# Patient Record
Sex: MALE | Race: BLACK OR AFRICAN AMERICAN | Employment: OTHER | URBAN - METROPOLITAN AREA
[De-identification: names, ages, dates, MRNs, and addresses within clinical notes are randomized per-mention and may not be internally consistent; named-entity substitution may affect disease eponyms.]

---

## 2018-07-11 ENCOUNTER — OFFICE VISIT (OUTPATIENT)
Dept: ENT CLINIC | Age: 83
End: 2018-07-11

## 2018-07-11 VITALS — DIASTOLIC BLOOD PRESSURE: 70 MMHG | SYSTOLIC BLOOD PRESSURE: 120 MMHG

## 2018-07-11 DIAGNOSIS — H81.09 LABYRINTHINE VERTIGO, UNSPECIFIED LATERALITY: Primary | ICD-10-CM

## 2018-07-11 PROCEDURE — 99204 OFFICE O/P NEW MOD 45 MIN: CPT | Performed by: OTOLARYNGOLOGY

## 2018-07-11 RX ORDER — ATORVASTATIN CALCIUM 20 MG/1
20 TABLET, FILM COATED ORAL DAILY
COMMUNITY

## 2018-07-11 ASSESSMENT — ENCOUNTER SYMPTOMS
ALLERGIC/IMMUNOLOGIC NEGATIVE: 1
SINUS PAIN: 0
RESPIRATORY NEGATIVE: 1
SORE THROAT: 0
RHINORRHEA: 0
SINUS PRESSURE: 0
TROUBLE SWALLOWING: 0
FACIAL SWELLING: 0
VOICE CHANGE: 0
EYES NEGATIVE: 1

## 2020-08-10 ENCOUNTER — HOSPITAL ENCOUNTER (OUTPATIENT)
Dept: GENERAL RADIOLOGY | Age: 85
Discharge: HOME | End: 2020-08-10
Payer: MEDICARE

## 2020-08-10 PROCEDURE — 74220 X-RAY XM ESOPHAGUS 1CNTRST: CPT

## 2020-09-11 ENCOUNTER — OFFICE VISIT (OUTPATIENT)
Dept: ENT CLINIC | Age: 85
End: 2020-09-11
Payer: MEDICARE

## 2020-09-11 VITALS — TEMPERATURE: 97.7 F | HEART RATE: 74 BPM | SYSTOLIC BLOOD PRESSURE: 151 MMHG | DIASTOLIC BLOOD PRESSURE: 84 MMHG

## 2020-09-11 PROCEDURE — 99213 OFFICE O/P EST LOW 20 MIN: CPT | Performed by: OTOLARYNGOLOGY

## 2020-09-11 RX ORDER — OMEPRAZOLE 40 MG/1
40 CAPSULE, DELAYED RELEASE ORAL DAILY
Qty: 30 CAPSULE | Refills: 3 | Status: SHIPPED | OUTPATIENT
Start: 2020-09-11 | End: 2020-10-11

## 2020-09-11 NOTE — PROGRESS NOTES
Patient relates to specific problems. He has been having a problem with swallowing that has been occurring over the past several weeks. He had a recent barium swallow which did show esophageal dysmotility. No mass lesions have been noted. He is not had problems relative to aspiration. No fevers been noted. In addition over the past couple of weeks, he has been having vertigo lasting only a few seconds at a time when he turns to the right side. Does not occur at any other position. He has had chronic tinnitus but no change in it and this is bilateral.  Is had no particular change in his hearing. He has had no true vertigo other than that described. He does not get nauseous with the episodes. He is on no medications likely to precipitate it and no medications to treat it. He had somewhat similar episodes several years ago which we evaluated at that time. He does have a hearing loss for which she has been suggested to wear hearing aids on both sides. Currently, he appears in no acute distress. No nystagmus is noted. Ear examination reveals normal-appearing tympanic membranes and ear canals bilaterally. Oral examination is unremarkable. Indirect laryngoscopy reveals normal vocal cord function with no lesions noted. There is some mild inflammation posteriorly which may be consistent with some reflux. The neck is free of any adenopathy, mass, thyroid enlargement. Nasal mucosa is mildly edematous consistent with allergy but no purulence is noted and no evidence of obstruction is present. Findings are likely those of benign positional paroxysmal vertigo on the right side. I have discussed this in depth with him and I do feel that physical therapy for balance would be the best possible treatment for it. In addition the esophageal motility problem should be addressed with speech therapy doing dysphagia treatment. This will be ordered as well.   In addition, we will add omeprazole to control any reflux which could be contributing as well. He is understanding of all the methods described and I will be kept posted.

## 2020-09-14 ENCOUNTER — TELEPHONE (OUTPATIENT)
Dept: ENT CLINIC | Age: 85
End: 2020-09-14

## 2020-09-14 RX ORDER — FAMOTIDINE 40 MG/1
40 TABLET, FILM COATED ORAL EVERY EVENING
Qty: 30 TABLET | Refills: 0 | Status: SHIPPED | OUTPATIENT
Start: 2020-09-14 | End: 2020-10-06

## 2020-09-14 NOTE — TELEPHONE ENCOUNTER
Pt calling and states he would like a different medication that the Omeprazole.  After reading the side effects on the papers he does not want to try it

## 2020-09-22 ENCOUNTER — HOSPITAL ENCOUNTER (OUTPATIENT)
Dept: SPEECH THERAPY | Age: 85
Setting detail: THERAPIES SERIES
Discharge: HOME | End: 2020-09-22
Payer: MEDICARE

## 2020-09-22 ENCOUNTER — HOSPITAL ENCOUNTER (OUTPATIENT)
Dept: PHYSICAL THERAPY | Age: 85
Setting detail: THERAPIES SERIES
Discharge: HOME | End: 2020-09-22
Payer: MEDICARE

## 2020-09-22 PROCEDURE — 92526 ORAL FUNCTION THERAPY: CPT

## 2020-09-22 PROCEDURE — 92610 EVALUATE SWALLOWING FUNCTION: CPT

## 2020-09-22 PROCEDURE — 97161 PT EVAL LOW COMPLEX 20 MIN: CPT

## 2020-09-22 PROCEDURE — 97112 NEUROMUSCULAR REEDUCATION: CPT

## 2020-09-22 PROCEDURE — 97140 MANUAL THERAPY 1/> REGIONS: CPT

## 2020-09-22 NOTE — FLOWSHEET NOTE
[facility] - Outpatient Physical Therapy    Physical Therapy Daily Treatment Note  Date:  2020    Patient Name:  Moisés Pino    :  1935  MRN: [id number]  Medical/Treatment Diagnosis Information:  · Diagnosis: H81.11 (ICD-10-CM) - Benign paroxysmal positional vertigo of right ear  · Treatment Diagnosis: dizziness, positional vertigo  Insurance/Certification information:  PT Insurance Information: Tomi Poe  W Mickey Segal  Physician Information:  Referring Practitioner: Maia Colon MD  Plan of care signed (Y/N): []  Yes [x]  No     Progress Report: [x]  Yes  []  No     Date Range for reporting period:  Beginning 20  Ending TBD    Progress report due (10 Rx/or 30 days whichever is less): DC     Recertification due (POC duration/ or 90 days whichever is less): DC     Visit # Insurance Allowable Date Range (if applicable)        Latex Allergy:  [x]NO      []YES  Preferred Language for Healthcare:   [x]English       []other:    Dizziness level:  0-6/10     SUBJECTIVE:  See eval    OBJECTIVE: See eval    RESTRICTIONS/PRECAUTIONS: difficulty swallowing    Exercises/Interventions:     Therapeutic Exercises (67170)  Resistance Sets / Reps Notes                                                                     Neuromuscular Re-ed (04994) Therapeutic Activities (53623)      SEE EDU BELOW  8'                                   Manual Intervention (15921)      EPLEY R for PC BPPV  2X 9/22 - performed, patient & spouse educated                                   Patient Education & HEP:  - Patient educated on the focus of skilled physical therapy services and plan of care, including: diagnosis, prognosis, treatment goals & options. Patient educated on otoliths and how they become canaliths, as well as how they are restored to otoliths through repositioning techniques.  The patient was educated on performance of Epley safely, and encouraged to perform twice per day until symptoms resolve. - The following exercises were added to the patient's home exercise program. (R SELF EPLEY) Additionally, the patient was educated on appropriate frequency, intensity and duration. A handout was provided  - All patient questions were answered    Therapeutic Exercise and NMR EXR  [] (58366) Provided verbal/tactile cueing for activities related to strengthening, flexibility, endurance, ROM for improvements in  [] LE / Lumbar: LE, proximal hip, and core control with self care, mobility, lifting, ambulation. [] UE / Cervical: cervical, postural, scapular, scapulothoracic and UE control with self care, reaching, carrying, lifting, house/yardwork, driving, computer work. [x] (98604) Provided verbal/tactile cueing for activities related to improving balance, coordination, kinesthetic sense, posture, motor skill, proprioception to assist with   [] LE / lumbar: LE, proximal hip, and core control in self care, mobility, lifting, ambulation and eccentric single leg control. [x] UE / cervical: cervical, scapular, scapulothoracic and UE control with self care, reaching, carrying, lifting, house/yardwork, driving, computer work.      NMR and Therapeutic Activities:    [x] (92509 or 47388) Provided verbal/tactile cueing for activities related to improving balance, coordination, kinesthetic sense, posture, motor skill, proprioception and motor activation to allow for proper function of   [] LE: / Lumbar core, proximal hip and LE with self care and ADLs  [x] UE / Cervical: cervical, postural, scapular, scapulothoracic and UE control with self care, carrying, lifting, driving, computer work.   [] (77093) Gait Re-education- Provided training and instruction to the patient for proper LE, core and proximal hip recruitment and positioning and eccentric body weight control with ambulation re-education including up and down stairs     Home Exercise Program:    [] (55740) Reviewed/Progressed HEP activities related to strengthening, flexibility, endurance, ROM of   [] LE / Lumbar: core, proximal hip and LE for functional self-care, mobility, lifting and ambulation/stair navigation   [] UE / Cervical: cervical, postural, scapular, scapulothoracic and UE control with self care, reaching, carrying, lifting, house/yardwork, driving, computer work  [x] (35942)Reviewed/Progressed HEP activities related to improving balance, coordination, kinesthetic sense, posture, motor skill, proprioception of   [] LE: core, proximal hip and LE for self care, mobility, lifting, and ambulation/stair navigation    [x] UE / Cervical: cervical, postural,  scapular, scapulothoracic and UE control with self care, reaching, carrying, lifting, house/yardwork, driving, computer work    Manual Treatments:  PROM / STM / Oscillations-Mobs:  G-I, II, III, IV (PA's, Inf., Post.)  [x] (69427) Provided manual therapy to mobilize LE, proximal hip and/or LS spine soft tissue/joints for the purpose of modulating pain, promoting relaxation,  increasing ROM, reducing/eliminating soft tissue swelling/inflammation/restriction, improving soft tissue extensibility and allowing for proper ROM for normal function with   [] LE / lumbar: self care, mobility, lifting and ambulation. [x] UE / Cervical: self care, reaching, carrying, lifting, house/yardwork, driving, computer work. Modalities:  [] (38019) Vasopneumatic compression: Utilized vasopneumatic compression to decrease edema / swelling for the purpose of improving mobility and quad tone / recruitment which will allow for increased overall function including but not limited to self-care, transfers, ambulation, and ascending / descending stairs.      Modalities: Consider [facility]    Charges:  Timed Code Treatment Minutes: 23   Total Treatment Minutes: 45     [x] EVAL - LOW (16481)   [] EVAL - MOD (66605)  [] EVAL - HIGH (16066)  [] RE-EVAL (75197)  [] TE (90433) x      [] Ionto  [x] NMR (26525) x 1      [] Vaso  [x] Manual plan (see comments)  [x] Plan of care initiated [] Hold pending MD visit [] Discharge    Electronically signed by: Sherman Dawson PT, DPT    Note: If patient does not return for scheduled/ recommended follow up visits, this note will serve as a discharge from care along with most recent update on progress.

## 2020-09-22 NOTE — PROGRESS NOTES
[facility] - Swallowing      Patient: Yakelin Cardona  : 1935  MRN: [id number]  Date: 2020  Electronically Signed by: Ema Hammans, MA, CCC-SLP       Note: In Levels 3-5, some patients may meet only one of the \"and/or\" criteria listed. If you have difficulty deciding on the most appropriate level for an individual, use dietary level as the most important criterion if the dietary level is the result of swallow function rather than dentition only. Dietary levels at [facility] and 7 should be judged only on swallow function, and any influence of poor dentition should be disregarded. []  Level 1 Individual is not able to swallow anything safely by mouth. All nutrition and hydration is received through non-oral means (e.g., nasogastric tube, PEG)    []  Level 2 Individual is not able to swallow safely by mouth for nutrition and hydration, but may take some consistency with consistent maximal cues in therapy only. Alternative method of feeding required    []  Level 3  Alternative method of feeding required as individual takes less than 50% of nutrition and hydration by mouth, and/or swallowing is safe with consistent use of moderate cues to use compensatory strategies and/or requires maximum diet restriction    []  Level 4 Swallowing is safe, but usually requires moderate cues to use compensatory strategies, and/or the individual has moderate diet restrictions and /or still requires tube feeding and/or oral supplements    [x]  Level 5 Swallowing is safe with minimal diet restriction and/or occasionally requires minimal cueing to use compensatory strategies. The individual may occasionally self-cue. All nutrition and hydration needs are met by mouth at mealtime    []  Level 6 Swallowing is safe, and the individual eats and drinks independently and may rarely require minimal cueing. The individual usually self-cues when difficulty occurs.   May need to avoid specific food items (e.g., popcorn and nuts), or require additional time (due to dysphasia)    []  Level 7 The individual's ability to eat independently is not limited by swallow function. Swallowing would be safe and efficient for all consistencies. Compensatory strategies are effectively used when needed       Diet levels/restrictions are defined on next page. Please use levels as a guide in scoring this FCM                                        Swallowing: Dietary Levels/Restrictions  · Maximum Restrictions: Diet is two or more levels below a regular diet status and liquid consistency  · Moderate Restrictions: Diet is two or more levels below a regular diet status in either solid or liquid consistency (but not both), OR diet is one level below in both solid and liquid consistency   · Minimum Restrictions: Diet is one level below a regular diet status in solid or liquid consistency    Solids  · Regular: No restrictions  · Reduced One Level: Meats are cooked until soft, with not tough or stringy foods. Might include meats like meat loaf, baked fish, and soft chicken. Vegetables are cooked soft  · Reduced Two Levels: Meats are chopped or ground. Vegetables are one consistency (e.g., souffle, baked potato) or are mashed with a fork  · Reduced Three Levels: Meats and vegetables are pureed    Liquids  · Regular: Thin liquids;  No restrictions  · Reduced One Level: Nectar, syrup; mildly thick    · Reduced Two Levels: Honey; moderately thick  · Reduced Three Levels: Pudding; extra thick

## 2020-09-22 NOTE — PLAN OF CARE
[place], [address] [facility], [address]  Phone: [phone]   Fax: [phone]     Physical Therapy Certification    Dear Referring Practitioner: Nerissa Schwab, MD,    We had the pleasure of evaluating the following patient for physical therapy services at [address] [place] [address]. A summary of our findings can be found in the initial assessment below. This includes our plan of care. If you have any questions or concerns regarding these findings, please do not hesitate to contact me at the office phone number checked above. Thank you for the referral.       Physician Signature:_______________________________Date:__________________  By signing above (or electronic signature), therapist's plan is approved by physician      Patient: Ian Serrano   : 1935   MRN: [id number]  Referring Physician: Referring Practitioner: Nerissa Schwab, MD      Evaluation Date: 2020      Medical Diagnosis Information:  Diagnosis: H81.11 (ICD-10-CM) - Benign paroxysmal positional vertigo of right ear   Treatment Diagnosis: dizziness, positional vertigo                                         Insurance information: PT Insurance Information: [employer]     Precautions/ Contra-indications: N/A  Latex Allergy:  [x]NO      []YES  Preferred Language for Healthcare:   [x]English       []other:    SUBJECTIVE: Patient stated chief complaint: the patient reports when he turns onto his right side he feels dizziness. This can happen every time, this is not always the case on the left side. He has had at least 5 episodes. He closes his eyes to avoid the symptoms of spinning. He denies having used medication or receiving PT services for this issues. He admits to falling when he was knocked down by his dog, just yesterday, but he doesn't fall when experiencing dizziness.  He was referred to ENT regarding swallowing and dizziness, and this is what brings him to PT today. He denies pain. He has only vomited the first experience of vertigo, but never since. He will infrequently feel lightheadedness when rising too uickly and moving about. Relevant Medical History: HTN, difficulty swallowing    Pain Scale: 0/10     Type: []Constant   []Intermittent  []Radiating []Localized [x]other: N/A    Dizziness Scale: 8/10    Description of symptoms: [x] Vertigo []  Off-balance [] Lightheadedness    Symptoms are getting:  [] Better [] Worse  [x] Same      [] Episodic    Description of Spells: [] Constant [] Spontaneous [x] Motion Induced [] Induced by position changes [] Other:    Length of time spells occur: [x] Seconds [] Minutes  [] Hours [] Days [] Other:     Date of onset: over last several years    Setting in which symptoms first occurred: home, getting out of bed    What increases/provokes symptoms? Rolling in bed    What decreases/eases symptoms? Being still, closing    Hearing impairments:  [] Yes  [] No  [x] Other: high frequency difficulty; mild    Hearing changes since onset:  [] Yes  [x] No  [] Other:    Visual changes since onset: [] Yes  [x] No  [x] Other: always wears glasses    Recent falls:    [x] Yes  [] No     Comments: pet-induced, accidental      History of migraines/HA:  [] Yes  [x] No    Comments:    Previous treatments: N/A    Job requirements/work status: retired    Occupation/School: retired    Living Status/Prior Level of Function: Prior to this injury / incident, patient was independent with ADLs and IADLs, he maintains this indepences. He drives.     OBJECTIVE:     Palpation: N/A    Functional Mobility/Transfers: dizziness when rolling to R    Posture: WNL    Inspection: unremarkable    Numbness/Tingling: denies    Gait: (include devices/WB status) unremarkable    Cervical AROM    Cervical Flexion WFL    Cervical Extension [facility]    Cervical SB WFL L WFL R    Cervical rotation WFL L WFLR    Upper Extremity AROM        L R   Shoulder Flexion  [facility] WFL   Shoulder Abduction  [facility]   Shoulder External Rotation      Shoulder Internal Rotation      Elbow Flexion  WFL WFL   Elbow Extension  [facility] WFL     Myotomes Normal Abnormal Comments   Neck flexion (C1-C2) x     Neck side-bending (C3) x     Shoulder elevation (C4) x     Shoulder abduction (C5) x     Elbow flexion/wrist extension (C6) x     Elbow extension/wrist flexion (C7) x     Thumb abduction (C8) x     Finger abduction (T1) x       Oculomotor/Vestibular Examination & Special Tests:     Coordination:  Rapid alternating movements: [x] Normal [] Dysdiadochokinesia   Finger to Nose:   [x] Normal [] Dysmetric  Heel to Shin:    [] Normal [] Ataxic    Oculomotor/Vestibular Examination      Spontaneous nystagmus:  [] Left  [] Right [x] Absent    Gaze-Evoked nystagmus with fixation present:   Primary [] Present [x] Absent   Right  [] Present [x] Absent   Left  [] Present [x] Absent    Smooth Pursuit (H):  [x] Normal [] Abnormal Comments:    Smooth Pursuit (V):  [x] Normal [] Abnormal Comments:     Saccades (H):  [x] Normal [] Abnormal Comments:     Saccades (V):   [x] Normal [] Abnormal Comments:     Convergence:  [x] Normal [] Abnormal Comments:     Head Thrust Test:  [x] Normal [] Abnormal  Comments:      VOR Cancellation (H): [x] Normal [] Abnormal Comments:    VOR Cancellation (V): [x] Normal [] Abnormal Comments:     VOR (V):   [x] Normal [] Abnormal Comments:    VOR (H):   [x] Normal [] Abnormal Comments:    Positional Testing:  R Hallpike-Sanam maneuver:    Nystagmus:  [x] Yes  [] No  [x] Duration: 15 sec      [x] Direction: torsional upbeating to the R   Vertigo:  [x] Yes  [] No  [x] Duration: 15 sec    L Hallpike-Cumming maneuver:   Nystagmus:  [] Yes  [x] No  [] Duration:       [] Direction:    Vertigo:  [] Yes  [x] No  [] Duration:     Supine roll head right:   Nystagmus:  [] Yes  [x] No  [] Duration:       [] Direction:    Vertigo:  [] Yes  [x] No  [] Duration:     Supine roll head left:   Nystagmus:  [] assessed; no intervention required. [] Falls Risk assessed; Patient requires intervention due to being higher risk   TUG score (>12s at risk):     [] Falls education provided, including       ASSESSMENT: The patient is an 82year old male who presents with signs and symptoms consistent with posterior canal BPPV. This presentation was resolved after 2 Epley manuevers in the clinic this date. The patient will follow up with PT in 1-2 weeks regarding HEP and any symptoms, and will likely DC at that time.     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    [] Left  [] Horizontal Canal [] Cupulolithiasis   [] Decreased Gaze Stabilization    [] Increased Motion Sensitivity   [] Unilateral Vestibular Hypofunction [] Bilateral Vestibular Hypofunction   [] Gait Instability    [] Decreased tolerance for ADLs    [] Decreased functional strength   [] Reduced Balance/Proprioceptive control   [] Reduced ability to hear/focus   [] Noted cervical/thoracic/GHJ joint hypomobility   [] Noted cervical/thoracic/GHJ joint hypermobility   [] Decreased cervical/UE functional ROM   [] Noted Headache pain aggravated by neck movements with/without dizziness   [] Abnormal reflexes/sensation/myotomal/dermatomal deficits   [] Decreased DCF control or ability to hold head up   [] Decreased RC/scapular/core strength and neuromuscular control     Functional Activity Limitations (from functional questionnaire and intake)   []Reduced ability to tolerate prolonged functional positions   []Reduced ability or difficulty with changes of positions or transfers between positions  [x]Reduced ability to transfer in/out of bed or rolling in bed    []Reduced ability or tolerance with driving, reading and/or computer work   []Reduced ability to perform lifting, reaching, carrying tasks   []Reduced ability to forward bend   []Reduced ability to ambulate prolonged functional periods/distances/surfaces   []Reduced ability to ascend/descend stairs  []Reduced ability to concentrate/focus  []Reduced ability to turn/pitch head rapidly  []Reduced ability to self-correct for losses of balance []other:       Participation Restrictions   [x]Reduced participation in self-care activities (sleep)   []Reduced participation in home management activities   []Reduced participation in work activities   []Reduced participation in social activities   []Reduced participation in sport/recreational activities    Classification:   [x]Signs/symptoms consistent with BPPV (benign paroxysmal positional vertigo)      []Signs/symptoms consistent with unilateral peripheral vestibulopathy (i.e., vestibular neuritis, labyrinthitis, acoustic neuroma)   []Signs/symptoms consistent with central vestibulopathy  []Signs/symptoms consistent with migraine-related vestibulopathy  []Signs/symptoms consistent with Meniere's disease / post-traumatic hydrops  []Signs/symptoms consistent with perilymphatic fistula   []Signs/symptoms consistent with cervicogenic dizziness  []Signs/symptoms consistent with gait instability   []Signs/symptoms consistent with motion sensitivity    []signs/symptoms consistent with neck pain with mobility deficits     []signs/symptoms consistent with neck pain with movement coordinated impairments    []signs/symptoms consistent with neck pain with radiating pain    []signs/symptoms consistent with neck pain with headaches (cervicogenic)    []Signs/symptoms consistent with nerve root involvement including myotome & dermatome dysfunction   []sign/symptoms consistent with facet dysfunction of cervical and thoracic spine    []signs/symptoms consistent suggesting central cord compression/UMN syndromes   []signs/symptoms consistent with discogenic cervical pain   []signs/symptoms consistent with rib dysfunction   []signs/symptoms consistent with postural dysfunction   []signs/symptoms consistent with shoulder pathology    []signs/symptoms consistent with post-surgical status including decreased ROM, strength and function. []signs/symptoms consistent with pathology which may benefit from Dry Needling  []signs/symptoms which may limit the use of advanced manual therapy techniques: (Elevated CV risk profile, recent trauma, intolerance to end range positions, prior TIA, visual issues, UE neurological compromise)   []other:      Prognosis/Rehab Potential:      [x]Excellent   []Good    []Fair   []Poor    Tolerance of evaluation/treatment:    [x]Excellent   []Good    []Fair   []Poor     Physical Therapy Evaluation Complexity Justification  [x] A history of present problem with:  [] no personal factors and/or comorbidities that impact the plan of care;  []1-2 personal factors and/or comorbidities that impact the plan of care  [x]3 personal factors and/or comorbidities that impact the plan of care  [x] An examination of body systems using standardized tests and measures addressing any of the following: body structures and functions (impairments), activity limitations, and/or participation restrictions;:  [x] a total of 1-2 or more elements   [] a total of 3 or more elements   [] a total of 4 or more elements   [x] A clinical presentation with:  [x] stable and/or uncomplicated characteristics   [] evolving clinical presentation with changing characteristics  [] unstable and unpredictable characteristics;   [x] Clinical decision making of [x] low, [] moderate, [] high complexity using standardized patient assessment instrument and/or measurable assessment of functional outcome.     [x] EVAL (LOW) 43695 (typically 15 minutes face-to-face)  [] EVAL (MOD) 57644 (typically 30 minutes face-to-face)  [] EVAL (HIGH) 71008 (typically 45 minutes face-to-face)  [] RE-EVAL     PLAN:   Today's Treatment:    [x] See flowsheet   [x] Patient treated with canalith repositioning maneuver   [] Education materials provided on BPPV/Vestibular

## 2020-09-22 NOTE — PROGRESS NOTES
Complaint: Pt complains of sensation of bolus \"stuck\" in throat, specifically with solids. Pt also reports needing to \"think about swallowing. \"      Reason for Referral  Alber Garcia was referred for a bedside swallow evaluation to assess the efficiency of his swallow function, identify signs and symptoms of aspiration, and make recommendations regarding safe dietary consistencies, effective compensatory strategies, and safe eating environment. Impression  Dysphagia Diagnosis  Dysphagia Diagnosis: Mild oral stage dysphagia;Mild pharyngeal stage dysphagia  Dysphagia Outcome Severity Scale: Level 5: Mild dysphagia- Distant supervision. May need one diet consistency restricted  Oral mech exam grossly WFL. Pt provided various texture trials to evaluate overall swallow function. Pt exhibits mild oropharyngeal dysphagia characterized by decreased bolus control, reduced A-P propulsion with lingual pumping, suspected premature loss of bolus to pharynx, delayed swallow initiation, reduced hyolaryngeal elevation upon manual palpation, and s/s of delayed pharyngeal clearing. Thin liquids revealed suspected premature loss of bolus to pharynx with delayed swallow initiation and slightly reduced hyolaryngeal elevation. Regular textures revealed prolonged, but effective mastication, reduced A-P propulsion with lingual pumping, delayed swallow initiation, moderately reduced hyolaryngeal elevation, and s/s of delayed pharyngeal clearing as evidenced by 2 swallows to clear. Pt tolerated all other solids textures with improved swallow initiation and hyolaryngeal elevation. Pt did continue with s/s of delayed pharyngeal clearing. No overt clinical s/s of aspiration/penetration with any texture trials. Recommend initiate a Dysphagia III/Soft & Bite-Sized diet with thin liquids; meds in puree. Pt would benefit from dysphagia tx to improve pharyngeal swallow function and clearing.   Pt verbalized understanding and agreement with recommendations. Education provided re: dysphagia, plan of care, and laryngeal/pharyngeal swallow exercises x2 completed x5 repetitions each. Handouts provided. Pt may benefit from a Modified Barium Swallow study if reported sensation of bolus \"stuck\" in throat continues after initiation of tx exercises. Treatment Plan  Requires SLP Intervention: Yes  Duration/Frequency of Treatment: 2x week x4-6 weeks      Recommended Diet and Intervention  Solid: Diet Solids Recommendation: Dysphagia Soft and Bite-Sized (Dysphagia III)  Liquid: Liquid Consistency Recommendation: Thin  Medication:Recommended Form of Meds: Meds in puree  Therapeutic Interventions: Bolus control exercises, Vital Stim/NMES, Laryngeal exercises, Diet tolerance monitoring, Patient/Family education, Pharyngeal exercises    Compensatory Swallowing Strategies Attempted  Compensatory Swallowing Strategies: Small bites/sips;Upright as possible for all oral intake; Alternate solids and liquids; Remain upright for 30-45 minutes after meals;Eat/Feed slowly; Swallow 2 times per bite/sip      Treatment/Goals  Long-term Goals  Goal 1: Pt will tolerate least restrictive diet with no overt clinical s/s of aspiration/penetration. Dysphagia Goals:   1.) The patient will tolerate recommended diet without observed clinical signs of aspiration. 2.) The patient will tolerate regular consistency solids 10/10.  3.) The patient will swallow multiple textures with 1 second delay or less 10/10. General  Chart Reviewed: Yes  Visit Information  Onset Date: 09/11/20  SLP Insurance Information: [employer]; $40 copay  Behavior/Cognition  Behavior/Cognition: Alert; Cooperative;Pleasant mood  Temperature Spikes Noted: No  Respiratory Status: Room air  Communication Observation: Functional  Follows Directions: Simple  Dentition: Dentures top;Dentures bottom(did not have dentures with him for evaluation)  Patient Positioning: Upright in chair  Baseline Vocal Quality: Normal  Volitional Cough: Strong  Volitional Swallow: Delayed  Prior Dysphagia History: No dysphagia hx per chart review. Consistencies Administered: Reg solid; Dysphagia Soft and Bite-Sized (Dysphagia III); Thin - cup; Thin - straw         Vision/Hearing  Vision  Vision: Impaired  Vision Exceptions: Wears glasses at all times  Hearing  Hearing: Within functional limits    Oral Motor Deficits  Oral/Motor  Oral Motor: Within functional limits    Oral Phase Dysfunction  Oral Phase  Oral Phase: Exceptions  Oral Phase Dysfunction  Impaired Mastication: All  Decreased Anterior to Posterior Transit: All  Suspected Premature Bolus Loss: All     Indicators of Pharyngeal Phase Dysfunction   Pharyngeal Phase  Pharyngeal Phase: Exceptions  Indicators of Pharyngeal Phase Dysfunction  Delayed Swallow: All  Decreased Laryngeal Elevation: All    Prognosis  Prognosis  Prognosis for safe diet advancement: good  Barriers to reach goals: age;time post onset  Individuals consulted  Consulted and agree with results and recommendations: Patient    Education  Patient Education: Pt educated on role of SLP, results of evaluation, diet recommendations, and plan of care. Patient Education Response: Verbalizes understanding;Demonstrated understanding       Therapy Time  SLP Individual Minutes  Time In: 9730  Time Out: 1630  Minutes: 39     SLP Total Treatment Time  Timed Code Treatment Minutes: 0 Minutes  Total Treatment Time: [address]  M. A.  CCC-SLP SDesirePDesire [id number]  Speech-Language Pathologist

## 2020-09-25 ENCOUNTER — HOSPITAL ENCOUNTER (OUTPATIENT)
Dept: SPEECH THERAPY | Age: 85
Setting detail: THERAPIES SERIES
Discharge: HOME | End: 2020-09-25
Payer: MEDICARE

## 2020-09-25 PROCEDURE — 92526 ORAL FUNCTION THERAPY: CPT

## 2020-09-25 NOTE — FLOWSHEET NOTE
[facility]    Speech Therapy Daily Treatment Note  Date:  2020    Patient Name:  Cheryl Pelaez    :  1935  MRN: [id number]  Medical/Treatment Diagnosis Information:  ·  Dysphagia (R13.10)  ·  Mild Oropharyngeal Dysphagia  Insurance/Certification information:    [employer]; $40 copay  Physician Information:    Dr. Fe Perez of care signed (Y/N): Y; 20    Date of Patient follow up with Physician: CHAN    Functional outcome measure: NOMS- Swallowing- 5    Progress Note: []  Yes  [x]  No  Next due by: Visit #2/10      RESTRICTIONS/PRECAUTIONS: aspiration  Latex Allergy:  [x]NO      []YES    Preferred Language for Healthcare:   [x]English       []other:    Visit # Insurance Allowable Date Range (if applicable)    Medically necessary n/a     Pain level:  0/10     SUBJECTIVE:  Pt's wife accompanied pt to tx session today. Pt alert and cooperative. Reports completing exercises at home as instructed at initial evaluation. Pt reports no further difficulty with pills now that he is taking with puree. OBJECTIVE:     Exercises/Interventions:     Dysphagia Therapy (66609) Strategies Tolerance Notes   Diet texture:      Regular Small bites 10/10 Pt recalled to bring dentures today; solid diet texture tolerance re-assessed with dentures in place; Prolonged, but effective mastication with adequate oral clearance; no reported sensation of bolus \"stuck\"   Dysphagia III/Soft & Bite-Sized      Dysphagia II/Minced & Moist      Dysphagia I/Puree      Liquid Consistency:       Thin Small sips, bolus hold /10 Pt with immediate cough x1  Audible swallows assessed   Nectar thick/Mildly thick      Honey thick/Moderately thick      Ice chips       Reps Cue level    Exercises: Pt instructed and trained in laryngeal/pharyngeal strengthening exercises including:      [x]Adelina maneuver x20 min    [x]Effortful swallows x40 min    []Supraglottic swallows      [x]Mendelsohn maneuver x10 Mod-max    [x]Modified Shaker (sitting up chin to chest) x10 min    Bolus Control Exercises: Pt instructed and trained in completion of bolus control exercises via pink swab as follows:      []Lateralizations      []A-P propulsions       []Diagonal A-P propulsions      []L/R A-P propulsion      [x]Neuromuscular electrical stimulation (VitalStim) was initiated in conjunction with dysphagia treatment via placement 3a,10.5 mA for 30 minutes, PO trials consumed including thin liquids x 6oz and regular solids x10 trials. Education provided re: risks and rationale for use of VitalStim. Pt in agreement to proceed with use. Encouraged pt to shave neck/facial hair prior to next tx session.      Pt. Education:  -pt educated on diagnosis, prognosis and expectations for rehab  -all pt questions were answered  -pt verbalized understanding and agreement    HEP instruction:  -pt provided with written and illustrated instructions for HEP (see scanned image in )  -pt provided with laryngeal/pharyngeal exercise handout and instructed to complete 2x daily    Speech/Voice Therapy:    []  (92766) Treatment of speech, language, voice, communication, and/or auditory processing disorder; individual    Cognitive Function:  []  (19281) Therapeutic interventions that focus on cognitive function (eg, attention, memory, reasoning, executive function, problem solving, and/or pragmatic functioning) and compensatory strategies to manage the performance of an activity (eg, managing time or schedules, initiating, organizing, and sequencing tasks), direct (one-on-one) patient contact; initial 15 minutes (Report 21351 only once per day)  []  ([phone]) each additional 15 minutes     Dysphagia Therapy:    [x]  (54599) Treatment of swallowing dysfunction and/or oral function for feeding         Charges:  Timed Code Treatment Minutes: 0 minutes   Total Treatment Minutes: 45 minutes     [] Speech-Language Treatment (60983)        [] Voice Treatment (74297)                                         [] Cognitive-Linguistic Skills Development Initial 15 minutes (51292)  [] Cognitive-Linguistic Skills Development Additional 15 minutes (92573)   [x] Dysphagia Treatment/NMES (VitalStim) (66518)  [] Other:     GOALS:   Dysphagia Goals:   1.) The patient will tolerate recommended diet without observed clinical signs of aspiration. -progressing  2.) The patient will tolerate regular consistency solids 10/10.-progressing  3.) The patient will swallow multiple textures with 1 second delay- not addressed today    Progression Towards Functional goals:  [] Patient is progressing as expected towards functional goals listed. [] Progression is slowed due to complexities listed. [] Progression has been slowed due to co-morbidities. [x] Plan just implemented, too soon to assess goals progression  [] Other:     Persisting Functional Limitations/Impairments:  []Attention []Word Finding       []Memory []Speech Intelligibility      []Executive Function [x]Diet Tolerance     []Problem Solving [x]Coughing/Choking with PO  []Expressive Language []Medication/Finance Management  []Receptive Language [x]Other: tolerance of pills in puree      ASSESSMENT:  See eval  Treatment/Activity Tolerance:  [x] Patient able to complete tx  [] Patient limited by fatigue  [] Patient limited by pain  [] Patient limited by other medical complications  [] Other:     Prognosis: [x] Good [] Fair  [] Poor    Patient Requires Follow-up: [x] Yes  [] No    PLAN: See eval. ST 2x / week for 4-6 weeks. [x] Continue per plan of care [] Alter current plan (see comments)  [x] Plan of care initiated [] Hold pending MD visit [] Discharge    Electronically signed by:     Odean Homans M. A.  CCC-SLP SDesirePDesire [id number]  Speech-Language Pathologist

## 2020-09-29 ENCOUNTER — HOSPITAL ENCOUNTER (OUTPATIENT)
Dept: SPEECH THERAPY | Age: 85
Setting detail: THERAPIES SERIES
Discharge: HOME | End: 2020-09-29
Payer: MEDICARE

## 2020-09-29 PROCEDURE — 92526 ORAL FUNCTION THERAPY: CPT

## 2020-09-29 NOTE — FLOWSHEET NOTE
[facility]    Speech Therapy Daily Treatment Note  Date:  2020    Patient Name:  Porfirio Pugh    :  1935  MRN: [id number]  Medical/Treatment Diagnosis Information:  ·  Dysphagia (R13.10)  ·  Mild Oropharyngeal Dysphagia  Insurance/Certification information:    [employer] [place]; $40 copay  Physician Information:    Dr. Hemalatha Conner of care signed (Y/N): Y; 20    Date of Patient follow up with Physician: CHAN    Functional outcome measure: NOMS- Swallowing- 5    Progress Note: []  Yes  [x]  No  Next due by: Visit #3/10      RESTRICTIONS/PRECAUTIONS: aspiration  Latex Allergy:  [x]NO      []YES    Preferred Language for Healthcare:   [x]English       []other:    Visit # Insurance Allowable Date Range (if applicable)    Medically necessary n/a     Pain level:  0/10     SUBJECTIVE:  Pt's wife accompanied pt to tx session today per usual.  Pt alert and cooperative. Pt again reports completing exercises at home as instructed at initial evaluation. Pt reports no further sensation of needing to tell self to swallow when eating dry foods. Does report decreased saliva/dry mouth and occasional coughing on own saliva when reclined in chair. Education provided on ways to increase saliva. OBJECTIVE:     Exercises/Interventions:     Dysphagia Therapy (11658) Strategies Tolerance Notes   Diet texture:      Regular Small bites  Prolonged, but effective mastication with adequate oral clearance; no reported sensation of bolus \"stuck\" throughout   Dysphagia III/Soft & Bite-Sized      Dysphagia II/Minced & Moist      Dysphagia I/Puree      Liquid Consistency:       Thin Small sips, bolus hold / Pt with immediate cough x1  Audible swallows assessed   Nectar thick/Mildly thick      Honey thick/Moderately thick      Ice chips       Reps Cue level    Exercises: Pt instructed and trained in laryngeal/pharyngeal strengthening exercises including:      [x]Adelina maneuver x15 min    [x]Effortful swallows x20 min    []Supraglottic swallows      [x]Mendelsohn maneuver x15 min-mod Pt with throat clears during exercises   [x]Modified Shaker (sitting up chin to chest) x10 min    Bolus Control Exercises: Pt instructed and trained in completion of bolus control exercises via pink swab as follows:      [x]Lateralizations x10 min    [x]A-P propulsions  x10 min    []Diagonal A-P propulsions      []L/R A-P propulsion      [x]Neuromuscular electrical stimulation (VitalStim) was initiated in conjunction with dysphagia treatment via placement 3a,10.5 mA for 31 minutes, PO trials consumed including thin liquids x 6oz and regular solids x10 trials. Education provided re: risks and rationale for use of VitalStim. Pt in agreement to proceed with use. Pt recalled to shave facial hair today.      Pt. Education:  -pt educated on diagnosis, prognosis and expectations for rehab  -all pt questions were answered  -pt verbalized understanding and agreement    HEP instruction:  -pt provided with written and illustrated instructions for HEP (see scanned image in )  -pt provided with laryngeal/pharyngeal exercise handout and instructed to complete 2x daily    Speech/Voice Therapy:    []  (39301) Treatment of speech, language, voice, communication, and/or auditory processing disorder; individual    Cognitive Function:  []  (70669) Therapeutic interventions that focus on cognitive function (eg, attention, memory, reasoning, executive function, problem solving, and/or pragmatic functioning) and compensatory strategies to manage the performance of an activity (eg, managing time or schedules, initiating, organizing, and sequencing tasks), direct (one-on-one) patient contact; initial 15 minutes (Report 22544 only once per day)  []  ([phone]) each additional 15 minutes     Dysphagia Therapy:    [x]  (76499) Treatment of swallowing dysfunction and/or oral function for

## 2020-10-01 ENCOUNTER — HOSPITAL ENCOUNTER (OUTPATIENT)
Dept: SPEECH THERAPY | Age: 85
Setting detail: THERAPIES SERIES
Discharge: HOME | End: 2020-10-01
Payer: MEDICARE

## 2020-10-01 PROCEDURE — 92526 ORAL FUNCTION THERAPY: CPT

## 2020-10-01 NOTE — FLOWSHEET NOTE
[facility]    Speech Therapy Daily Treatment Note  Date:  10/1/2020    Patient Name:  Anastasia Faustin    :  1935  MRN: [id number]  Medical/Treatment Diagnosis Information:  ·  Dysphagia (R13.10)  ·  Mild Oropharyngeal Dysphagia  Insurance/Certification information:    [email or web address] [place]; $40 copay  Physician Information:    Dr. Hilda Hernandez of care signed (Y/N): Y; 20    Date of Patient follow up with Physician: [email or web address]    Functional outcome measure: NOMS- Swallowing- 5    Progress Note: []  Yes  [x]  No  Next due by: Visit #4/10      RESTRICTIONS/PRECAUTIONS: aspiration  Latex Allergy:  [x]NO      []YES    Preferred Language for Healthcare:   [x]English       []other:    Visit # Insurance Allowable Date Range (if applicable)    Medically necessary n/a     Pain level:  0/10     SUBJECTIVE:  Pt's wife accompanied pt to tx session today per usual.  Pt alert and cooperative. Pt again reports completing exercises at home as instructed at initial evaluation. Pt reports no further sensation of needing to tell self to swallow when eating dry foods. Reports continued decreased saliva/dry mouth. Encouraged pt to discuss current medications and possible side effects of dry mouth with PCP or pharmacist.  Pt also encouraged to hydrate more. Pt reports he no longer has to \"think about swallowing. \"    OBJECTIVE:     Exercises/Interventions:     Dysphagia Therapy (80162) Strategies Tolerance Notes   Diet texture:      Regular Small bites 15/15 Adequate mastication and oral clearance; no reported sensation of bolus \"stuck\" throughout   Dysphagia III/Soft & Bite-Sized      Dysphagia II/Minced & Moist      Dysphagia I/Puree      Liquid Consistency:       Thin Small sips, bolus hold 44/45 Pt with immediate throat clear x1; Intermittent audible swallows assessed   Nectar thick/Mildly thick      Honey thick/Moderately thick      Ice chips       Reps Cue level Exercises: Pt instructed and trained in laryngeal/pharyngeal strengthening exercises including:      [x]Adelina maneuver x30 indep    [x]Effortful swallows x30 indep    []Supraglottic swallows      [x]Mendelsohn maneuver x30 min Pt with occasional  throat clears during this exercises   [x]Modified Shaker (sitting up chin to chest) x10 min    Bolus Control Exercises: Pt instructed and trained in completion of bolus control exercises via pink swab as follows:      []Lateralizations      []A-P propulsions       []Diagonal A-P propulsions      []L/R A-P propulsion      [x]Neuromuscular electrical stimulation (VitalStim) was initiated in conjunction with dysphagia treatment via placement 3a,10.5 mA for 42 minutes, PO trials consumed including thin liquids x 12 oz and regular solids x15 trials. Education provided re: risks and rationale for use of VitalStim. Pt in agreement to proceed with use. Pt recalled to shave facial hair today.      Pt. Education:  -pt educated on diagnosis, prognosis and expectations for rehab  -all pt questions were answered  -pt verbalized understanding and agreement    HEP instruction:  -pt provided with written and illustrated instructions for HEP (see scanned image in )  -pt provided with laryngeal/pharyngeal exercise handout and instructed to complete 2x daily    Speech/Voice Therapy:    []  (29595) Treatment of speech, language, voice, communication, and/or auditory processing disorder; individual    Cognitive Function:  []  (76693) Therapeutic interventions that focus on cognitive function (eg, attention, memory, reasoning, executive function, problem solving, and/or pragmatic functioning) and compensatory strategies to manage the performance of an activity (eg, managing time or schedules, initiating, organizing, and sequencing tasks), direct (one-on-one) patient contact; initial 15 minutes (Report 93804 only once per day)  []  ([phone]) each additional 15 minutes     Dysphagia Therapy:    [x]  (53391) Treatment of swallowing dysfunction and/or oral function for feeding         Charges:  Timed Code Treatment Minutes: 0 minutes   Total Treatment Minutes: 45 minutes     [] Speech-Language Treatment (85888)        [] Voice Treatment (05921)                                         [] Cognitive-Linguistic Skills Development Initial 15 minutes (61418)  [] Cognitive-Linguistic Skills Development Additional 15 minutes (84934)   [x] Dysphagia Treatment/NMES (VitalStim) (87292)  [] Other:     GOALS:   Dysphagia Goals:   1.) The patient will tolerate recommended diet without observed clinical signs of aspiration. -progressing  2.) The patient will tolerate regular consistency solids 10/10.-progressing  3.) The patient will swallow multiple textures with 1 second delay- not addressed today    Progression Towards Functional goals:  [] Patient is progressing as expected towards functional goals listed. [] Progression is slowed due to complexities listed. [] Progression has been slowed due to co-morbidities. [x] Plan just implemented, too soon to assess goals progression  [] Other:     Persisting Functional Limitations/Impairments:  []Attention []Word Finding       []Memory []Speech Intelligibility      []Executive Function [x]Diet Tolerance     []Problem Solving [x]Coughing/Choking with PO  []Expressive Language []Medication/Finance Management  []Receptive Language [x]Other: tolerance of pills in puree      ASSESSMENT:  See eval  Treatment/Activity Tolerance:  [x] Patient able to complete tx  [] Patient limited by fatigue  [] Patient limited by pain  [] Patient limited by other medical complications  [] Other:     Prognosis: [x] Good [] Fair  [] Poor    Patient Requires Follow-up: [x] Yes  [] No    PLAN: See brett. ST 2x / week for 4-6 weeks.    [x] Continue per plan of care [] Alter current plan (see comments)  [x] Plan of care initiated [] Hold pending MD visit [] Discharge    Electronically signed by:     Todd SALAZAR  CCC-SLP S.P. [id number]  Speech-Language Pathologist

## 2020-10-06 ENCOUNTER — HOSPITAL ENCOUNTER (OUTPATIENT)
Dept: PHYSICAL THERAPY | Age: 85
Setting detail: THERAPIES SERIES
Discharge: HOME | End: 2020-10-06
Payer: MEDICARE

## 2020-10-06 ENCOUNTER — HOSPITAL ENCOUNTER (OUTPATIENT)
Dept: SPEECH THERAPY | Age: 85
Setting detail: THERAPIES SERIES
Discharge: HOME | End: 2020-10-06
Payer: MEDICARE

## 2020-10-06 PROCEDURE — 97112 NEUROMUSCULAR REEDUCATION: CPT

## 2020-10-06 PROCEDURE — 92526 ORAL FUNCTION THERAPY: CPT

## 2020-10-06 RX ORDER — FAMOTIDINE 40 MG/1
TABLET, FILM COATED ORAL
Qty: 30 TABLET | Refills: 0 | Status: SHIPPED | OUTPATIENT
Start: 2020-10-06

## 2020-10-06 NOTE — DISCHARGE SUMMARY
facilitate improvement in movement, function, balance and ADLs as indicated by Functional Deficits. []? Progressing: [x]? Met: []? Not Met: []? Adjusted    Date range of Visits: 20 - 10/6/20  Total Visits: 2    Recommendation:  [x] Discharge to [facility]. Follow up with PT PRN. Physical Therapy Daily Treatment Note  Date:  10/6/2020    Patient Name:  Marie Diego    :  1935  MRN: [id number]  Medical/Treatment Diagnosis Information:  · Diagnosis: H81.11 (ICD-10-CM) - Benign paroxysmal positional vertigo of right ear  · Treatment Diagnosis: dizziness, positional vertigo  Insurance/Certification information:  PT Insurance Information: Catherine Ordonez W Mickey Segal  Physician Information:  Referring Practitioner: Chaparrita Alfredo MD  Plan of care signed (Y/N): [x]  Yes []  No     Progress Report: [x]  Yes  []  No     Date Range for reporting period:  Beginning 20  Ending 10/6/20    Progress report due (10 Rx/or 30 days whichever is less): DC     Recertification due (POC duration/ or 90 days whichever is less): DC     Visit # Insurance Allowable Date Range (if applicable)    PMN 1472     Latex Allergy:  [x]NO      []YES  Preferred Language for Healthcare:   [x]English       []other:    Dizziness level:  0/10      SUBJECTIVE: He denies dizziness or any episodes since first visit with PT. He has rolled to his right side in bed and had no symptoms.  SEE DC    OBJECTIVE: SEE DC    RESTRICTIONS/PRECAUTIONS: difficulty swallowing    Exercises/Interventions:     Therapeutic Exercises (31486)  Resistance Sets / Reps Notes                                                                     Neuromuscular Re-ed (79619) Therapeutic Activities (78519)      SEE EDU BELOW  R/L Sanam Hallpike   R/L (neg) 10/6   Horizontal Roll Test  R/L (neg) 10/6   Review of Self-Epley  2' 10/6                Manual Intervention ([id number])      EPLEY R for PC BPPV  2X 9/22 - performed, patient & spouse educated                                   Patient Education & HEP:   - Patient educated on the focus of skilled physical therapy services and plan of care, including: diagnosis, prognosis, treatment goals & options. Patient educated on otoliths and how they become canaliths, as well as how they are restored to otoliths through repositioning techniques. The patient was educated on performance of Epley safely, and encouraged to perform twice per day until symptoms resolve. - The following exercises were added to the patient's home exercise program. (R SELF EPLEY) Additionally, the patient was educated on appropriate frequency, intensity and duration. A handout was provided  - All patient questions were answered    Therapeutic Exercise and NMR EXR  [] (63257) Provided verbal/tactile cueing for activities related to strengthening, flexibility, endurance, ROM for improvements in  [] LE / Lumbar: LE, proximal hip, and core control with self care, mobility, lifting, ambulation. [] UE / Cervical: cervical, postural, scapular, scapulothoracic and UE control with self care, reaching, carrying, lifting, house/yardwork, driving, computer work. [x] (65715) Provided verbal/tactile cueing for activities related to improving balance, coordination, kinesthetic sense, posture, motor skill, proprioception to assist with   [] LE / lumbar: LE, proximal hip, and core control in self care, mobility, lifting, ambulation and eccentric single leg control. [x] UE / cervical: cervical, scapular, scapulothoracic and UE control with self care, reaching, carrying, lifting, house/yardwork, driving, computer work.      NMR and Therapeutic Activities:    [x] (25281 or 09507) Provided verbal/tactile cueing for activities related to improving balance, coordination, kinesthetic sense, posture, motor skill, proprioception and motor activation to allow for proper function of   [] LE: / Lumbar core, proximal hip and LE with self care and ADLs  [x] UE / Cervical: cervical, postural, scapular, scapulothoracic and UE control with self care, carrying, lifting, driving, computer work.   [] (22181) Gait Re-education- Provided training and instruction to the patient for proper LE, core and proximal hip recruitment and positioning and eccentric body weight control with ambulation re-education including up and down stairs     Home Exercise Program:    [] (49515) Reviewed/Progressed HEP activities related to strengthening, flexibility, endurance, ROM of   [] LE / Lumbar: core, proximal hip and LE for functional self-care, mobility, lifting and ambulation/stair navigation   [] UE / Cervical: cervical, postural, scapular, scapulothoracic and UE control with self care, reaching, carrying, lifting, house/yardwork, driving, computer work  [] (45740)Reviewed/Progressed HEP activities related to improving balance, coordination, kinesthetic sense, posture, motor skill, proprioception of   [] LE: core, proximal hip and LE for self care, mobility, lifting, and ambulation/stair navigation    [] UE / Cervical: cervical, postural,  scapular, scapulothoracic and UE control with self care, reaching, carrying, lifting, house/yardwork, driving, computer work    Manual Treatments:  PROM / STM / Oscillations-Mobs:  G-I, II, III, IV (PA's, Inf., Post.)  [] (32891) Provided manual therapy to mobilize LE, proximal hip and/or LS spine soft tissue/joints for the purpose of modulating pain, promoting relaxation,  increasing ROM, reducing/eliminating soft tissue swelling/inflammation/restriction, improving soft tissue extensibility and allowing for proper ROM for normal function with   [] LE / lumbar: self care, mobility, lifting and ambulation. [] UE / Cervical: self care, reaching, carrying, lifting, house/yardwork, driving, computer work.      Modalities:  [] (73543) Vasopneumatic compression: Utilized vasopneumatic compression to decrease edema / swelling for the purpose of improving mobility and quad tone / recruitment which will allow for increased overall function including but not limited to self-care, transfers, ambulation, and ascending / descending stairs. Modalities: Consider MOC    Charges:  Timed Code Treatment Minutes: 15   Total Treatment Minutes: 15     [] EVAL - LOW (87678)   [] EVAL - MOD (17095)  [] EVAL - HIGH (08452)  [] RE-EVAL (10322)  [] TE (81766) x      [] Ionto  [x] NMR (80142) x 1      [] Vaso  [] Manual (11383) x      [] Ultrasound  [] TA x      [] Mech Traction (52671)  [] Gait Training x     [] ES (un) (87102):   [] Aquatic therapy x   [] Other:   [] Group:     Overall Progression Towards Functional goals/ Treatment Progress Update:  [x] Patient is progressing as expected towards functional goals listed. [] Progression is slowed due to complexities/Impairments listed. [] Progression has been slowed due to co-morbidities.   [] Plan just implemented, too soon to assess goals progression <30days   [] Goals require adjustment due to lack of progress  [] Patient is not progressing as expected and requires additional follow up with physician  [] Other:     Persisting Functional Limitations/Impairments:  []Sleeping []Sitting               []Standing []Transfers (rolling in bed)        []Walking []Kneeling               []Stairs []Squatting / bending   []ADLs []Reaching  []Lifting  []Housework  []Driving []Job related tasks  []Sports/Recreation []Other:      ASSESSMENT:  SEE DC  Treatment/Activity Tolerance:  [x] Patient tolerated treatment well [] Patient limited by fatigue  [] Patient limited by pain  [] Patient limited by other medical complications  [] Other:     Prognosis: [x] Good [] Fair  [] Poor    Patient Requires Follow-up: [] Yes  [x] No    PLAN: DC  [] Continue per plan of care [] Alter current plan (see comments)   [] Plan of care initiated [] Hold pending MD visit [x] Discharge    Electronically signed by: Maria A Landin PT, DPT, OMT-C    Note: If patient does not return for scheduled/ recommended

## 2020-10-06 NOTE — FLOWSHEET NOTE
[x]Adelina maneuver x30 indep    [x]Effortful swallows x30 indep    []Supraglottic swallows      [x]Mendelsohn maneuver x30 min    [x]CTAR (isometric and isokinetic) x5 held x 1 minute;  x10 repetitions min    Bolus Control Exercises: Pt instructed and trained in completion of bolus control exercises via pink swab as follows:      []Lateralizations      []A-P propulsions       []Diagonal A-P propulsions      []L/R A-P propulsion      [x]Neuromuscular electrical stimulation (VitalStim) was initiated in conjunction with dysphagia treatment via placement 3a,11.5 mA for 32 minutes, PO trials consumed including thin liquids x 12 oz and regular solids x15 trials. Education provided re: risks and rationale for use of VitalStim. Pt in agreement to proceed with use. Pt recalled to shave facial hair today.      Pt. Education:  -pt educated on diagnosis, prognosis and expectations for rehab  -all pt questions were answered  -pt verbalized understanding and agreement    HEP instruction:  -pt provided with written and illustrated instructions for HEP (see scanned image in )  -pt provided with laryngeal/pharyngeal exercise handout and instructed to complete 2x daily    Speech/Voice Therapy:    []  (49692) Treatment of speech, language, voice, communication, and/or auditory processing disorder; individual    Cognitive Function:  []  (67983) Therapeutic interventions that focus on cognitive function (eg, attention, memory, reasoning, executive function, problem solving, and/or pragmatic functioning) and compensatory strategies to manage the performance of an activity (eg, managing time or schedules, initiating, organizing, and sequencing tasks), direct (one-on-one) patient contact; initial 15 minutes (Report 24748 only once per day)  []  ([phone]) each additional 15 minutes     Dysphagia Therapy:    [x]  (53954) Treatment of swallowing dysfunction and/or oral function for feeding         Charges:  Timed Code Treatment Minutes: 0 minutes   Total Treatment Minutes: 45 minutes     [] Speech-Language Treatment (25691)        [] Voice Treatment (87576)                                         [] Cognitive-Linguistic Skills Development Initial 15 minutes (19866)  [] Cognitive-Linguistic Skills Development Additional 15 minutes (73108)   [x] Dysphagia Treatment/NMES (VitalStim) (24952)  [] Other:     GOALS:   Dysphagia Goals:   1.) The patient will tolerate recommended diet without observed clinical signs of aspiration. -progressing  2.) The patient will tolerate regular consistency solids 10/10.-progressing  3.) The patient will swallow multiple textures with 1 second delay- not addressed today    Progression Towards Functional goals:  [] Patient is progressing as expected towards functional goals listed. [] Progression is slowed due to complexities listed. [] Progression has been slowed due to co-morbidities. [x] Plan just implemented, too soon to assess goals progression  [] Other:     Persisting Functional Limitations/Impairments:  []Attention []Word Finding       []Memory []Speech Intelligibility      []Executive Function [x]Diet Tolerance     []Problem Solving [x]Coughing/Choking with PO  []Expressive Language []Medication/Finance Management  []Receptive Language [x]Other: tolerance of pills in puree      ASSESSMENT:  See eval  Treatment/Activity Tolerance:  [x] Patient able to complete tx  [] Patient limited by fatigue  [] Patient limited by pain  [] Patient limited by other medical complications  [] Other:     Prognosis: [x] Good [] Fair  [] Poor    Patient Requires Follow-up: [x] Yes  [] No    PLAN: See eval. ST 2x / week for 4-6 weeks. [x] Continue per plan of care [] Alter current plan (see comments)  [x] Plan of care initiated [] Hold pending MD visit [] Discharge    Electronically signed by:     Ervin SALAZAR CCC-SLP MARCELINO [id number]  Speech-Language Pathologist     Note: If patient does not return for scheduled/ recommended follow up visits, this note will serve as a discharge from care along with most recent update on progress.

## 2020-10-08 ENCOUNTER — HOSPITAL ENCOUNTER (OUTPATIENT)
Dept: SPEECH THERAPY | Age: 85
Setting detail: THERAPIES SERIES
Discharge: HOME | End: 2020-10-08
Payer: MEDICARE

## 2020-10-08 NOTE — PROGRESS NOTES
Speech Therapy  Cancellation/No-show Note  Patient Name:  Nancy Us  :  1935   Date:  10/8/2020  Cancels to Date: 1  No-shows to Date: 0    Patient status for today's appointment patient:  [x]  Cancelled 10/8  []  Rescheduled appointment  []  No-show     Reason given by patient:  []  Patient ill  []  Conflicting appointment  []  No transportation    []  Conflict with work  []  No reason given  [x]  Other:  Pt came to visit today and states he was not aware of co-pay at every visit. Signed EOB at initial evaluation. Pt requesting to cancel remaining tx visits with unplanned discharge today. Pt also reports no further difficulty with current diet level.    Comments:      Phone call information:   []  Phone call made today to patient at _ time at number provided:      []  Patient answered, conversation as follows:    []  Patient did not answer, message left as follows:  [x]  Phone call not made today    Electronically signed by:  Darrick Berger, SLP

## 2020-10-13 ENCOUNTER — APPOINTMENT (OUTPATIENT)
Dept: SPEECH THERAPY | Age: 85
End: 2020-10-13
Payer: MEDICARE

## 2020-10-15 ENCOUNTER — APPOINTMENT (OUTPATIENT)
Dept: SPEECH THERAPY | Age: 85
End: 2020-10-15
Payer: MEDICARE

## 2020-10-20 ENCOUNTER — APPOINTMENT (OUTPATIENT)
Dept: SPEECH THERAPY | Age: 85
End: 2020-10-20
Payer: MEDICARE

## 2020-10-22 ENCOUNTER — APPOINTMENT (OUTPATIENT)
Dept: SPEECH THERAPY | Age: 85
End: 2020-10-22
Payer: MEDICARE

## 2022-04-14 ENCOUNTER — HOSPITAL ENCOUNTER (OUTPATIENT)
Age: 87
Discharge: HOME | End: 2022-04-14
Payer: MEDICARE

## 2022-04-14 LAB
A/G RATIO: 1.8 (ref 1.1–2.2)
ALBUMIN SERPL-MCNC: 4.5 G/DL (ref 3.4–5)
ALP BLD-CCNC: 81 U/L (ref 40–129)
ALT SERPL-CCNC: 16 U/L (ref 10–40)
ANION GAP SERPL CALCULATED.3IONS-SCNC: 10 MMOL/L (ref 3–16)
AST SERPL-CCNC: 20 U/L (ref 15–37)
BASOPHILS ABSOLUTE: 0 K/UL (ref 0–0.2)
BASOPHILS RELATIVE PERCENT: 0.8 %
BILIRUB SERPL-MCNC: 0.4 MG/DL (ref 0–1)
BUN BLDV-MCNC: 18 MG/DL (ref 7–20)
CALCIUM SERPL-MCNC: 10.7 MG/DL (ref 8.3–10.6)
CHLORIDE BLD-SCNC: 100 MMOL/L (ref 99–110)
CO2: 31 MMOL/L (ref 21–32)
CREAT SERPL-MCNC: 1.1 MG/DL (ref 0.8–1.3)
EOSINOPHILS ABSOLUTE: 0.1 K/UL (ref 0–0.6)
EOSINOPHILS RELATIVE PERCENT: 2.4 %
GFR AFRICAN AMERICAN: >60
GFR NON-AFRICAN AMERICAN: >60
GLUCOSE BLD-MCNC: 90 MG/DL (ref 70–99)
HCT VFR BLD CALC: 39 % (ref 40.5–52.5)
HEMOGLOBIN: 13.4 G/DL (ref 13.5–17.5)
LYMPHOCYTES ABSOLUTE: 0.9 K/UL (ref 1–5.1)
LYMPHOCYTES RELATIVE PERCENT: 26.3 %
MCH RBC QN AUTO: 32.8 PG (ref 26–34)
MCHC RBC AUTO-ENTMCNC: 34.3 G/DL (ref 31–36)
MCV RBC AUTO: 95.8 FL (ref 80–100)
MONOCYTES ABSOLUTE: 0.2 K/UL (ref 0–1.3)
MONOCYTES RELATIVE PERCENT: 7.2 %
NEUTROPHILS ABSOLUTE: 2.1 K/UL (ref 1.7–7.7)
NEUTROPHILS RELATIVE PERCENT: 63.3 %
PDW BLD-RTO: 12.6 % (ref 12.4–15.4)
PLATELET # BLD: 187 K/UL (ref 135–450)
PMV BLD AUTO: 8.3 FL (ref 5–10.5)
POTASSIUM SERPL-SCNC: 3.7 MMOL/L (ref 3.5–5.1)
RBC # BLD: 4.07 M/UL (ref 4.2–5.9)
SODIUM BLD-SCNC: 141 MMOL/L (ref 136–145)
TOTAL PROTEIN: 7 G/DL (ref 6.4–8.2)
TSH SERPL DL<=0.05 MIU/L-ACNC: 1.96 UIU/ML (ref 0.27–4.2)
WBC # BLD: 3.4 K/UL (ref 4–11)

## 2022-04-14 PROCEDURE — 83036 HEMOGLOBIN GLYCOSYLATED A1C: CPT

## 2022-04-14 PROCEDURE — 80053 COMPREHEN METABOLIC PANEL: CPT

## 2022-04-14 PROCEDURE — 36415 COLL VENOUS BLD VENIPUNCTURE: CPT

## 2022-04-14 PROCEDURE — 84443 ASSAY THYROID STIM HORMONE: CPT

## 2022-04-14 PROCEDURE — 85025 COMPLETE CBC W/AUTO DIFF WBC: CPT

## 2022-04-15 LAB
ESTIMATED AVERAGE GLUCOSE: 82.5 MG/DL
HBA1C MFR BLD: 4.5 %

## 2022-07-15 ENCOUNTER — TELEPHONE (OUTPATIENT)
Dept: CARDIOLOGY CLINIC | Age: 87
End: 2022-07-15

## 2024-11-07 ENCOUNTER — APPOINTMENT (OUTPATIENT)
Dept: GENERAL RADIOLOGY | Age: 89
DRG: 330 | End: 2024-11-07
Payer: MEDICARE

## 2024-11-07 ENCOUNTER — ANESTHESIA (OUTPATIENT)
Dept: OPERATING ROOM | Age: 89
End: 2024-11-07
Payer: MEDICARE

## 2024-11-07 ENCOUNTER — APPOINTMENT (OUTPATIENT)
Dept: CT IMAGING | Age: 89
DRG: 330 | End: 2024-11-07
Payer: MEDICARE

## 2024-11-07 ENCOUNTER — HOSPITAL ENCOUNTER (INPATIENT)
Age: 89
LOS: 7 days | Discharge: HOME HEALTH | DRG: 330 | End: 2024-11-14
Attending: EMERGENCY MEDICINE | Admitting: INTERNAL MEDICINE
Payer: MEDICARE

## 2024-11-07 ENCOUNTER — ANESTHESIA EVENT (OUTPATIENT)
Dept: OPERATING ROOM | Age: 89
End: 2024-11-07
Payer: MEDICARE

## 2024-11-07 DIAGNOSIS — K56.609 SBO (SMALL BOWEL OBSTRUCTION) (HCC): Primary | ICD-10-CM

## 2024-11-07 DIAGNOSIS — R10.10 UPPER ABDOMINAL PAIN: ICD-10-CM

## 2024-11-07 DIAGNOSIS — R11.0 NAUSEA: ICD-10-CM

## 2024-11-07 LAB
ALBUMIN SERPL-MCNC: 4.4 G/DL (ref 3.4–5)
ALBUMIN/GLOB SERPL: 1.4 {RATIO} (ref 1.1–2.2)
ALP SERPL-CCNC: 77 U/L (ref 40–129)
ALT SERPL-CCNC: 16 U/L (ref 10–40)
ANION GAP SERPL CALCULATED.3IONS-SCNC: 14 MMOL/L (ref 3–16)
AST SERPL-CCNC: 27 U/L (ref 15–37)
BACTERIA URNS QL MICRO: NORMAL /HPF
BASOPHILS # BLD: 0 K/UL (ref 0–0.2)
BASOPHILS NFR BLD: 0.4 %
BILIRUB SERPL-MCNC: 0.4 MG/DL (ref 0–1)
BILIRUB UR QL STRIP.AUTO: NEGATIVE
BUN SERPL-MCNC: 16 MG/DL (ref 7–20)
CALCIUM SERPL-MCNC: 11.3 MG/DL (ref 8.3–10.6)
CHLORIDE SERPL-SCNC: 96 MMOL/L (ref 99–110)
CLARITY UR: ABNORMAL
CO2 SERPL-SCNC: 29 MMOL/L (ref 21–32)
COLOR UR: YELLOW
CREAT SERPL-MCNC: 0.9 MG/DL (ref 0.8–1.3)
DEPRECATED RDW RBC AUTO: 12.7 % (ref 12.4–15.4)
EOSINOPHIL # BLD: 0 K/UL (ref 0–0.6)
EOSINOPHIL NFR BLD: 0 %
EPI CELLS #/AREA URNS AUTO: 0 /HPF (ref 0–5)
GFR SERPLBLD CREATININE-BSD FMLA CKD-EPI: 82 ML/MIN/{1.73_M2}
GLUCOSE SERPL-MCNC: 187 MG/DL (ref 70–99)
GLUCOSE UR STRIP.AUTO-MCNC: NEGATIVE MG/DL
HCT VFR BLD AUTO: 37.7 % (ref 40.5–52.5)
HGB BLD-MCNC: 12.6 G/DL (ref 13.5–17.5)
HGB UR QL STRIP.AUTO: NEGATIVE
HYALINE CASTS #/AREA URNS AUTO: 1 /LPF (ref 0–8)
KETONES UR STRIP.AUTO-MCNC: 15 MG/DL
LACTATE BLDV-SCNC: 2.3 MMOL/L (ref 0.4–1.9)
LACTATE BLDV-SCNC: 2.5 MMOL/L (ref 0.4–1.9)
LEUKOCYTE ESTERASE UR QL STRIP.AUTO: NEGATIVE
LIPASE SERPL-CCNC: 12 U/L (ref 13–60)
LYMPHOCYTES # BLD: 0.5 K/UL (ref 1–5.1)
LYMPHOCYTES NFR BLD: 6.1 %
MAGNESIUM SERPL-MCNC: 1.99 MG/DL (ref 1.8–2.4)
MCH RBC QN AUTO: 32.2 PG (ref 26–34)
MCHC RBC AUTO-ENTMCNC: 33.5 G/DL (ref 31–36)
MCV RBC AUTO: 96.1 FL (ref 80–100)
MONOCYTES # BLD: 0.2 K/UL (ref 0–1.3)
MONOCYTES NFR BLD: 2.2 %
NEUTROPHILS # BLD: 8 K/UL (ref 1.7–7.7)
NEUTROPHILS NFR BLD: 91.3 %
NITRITE UR QL STRIP.AUTO: NEGATIVE
NT-PROBNP SERPL-MCNC: <36 PG/ML (ref 0–449)
PH UR STRIP.AUTO: 8.5 [PH] (ref 5–8)
PLATELET # BLD AUTO: 242 K/UL (ref 135–450)
PMV BLD AUTO: 9.6 FL (ref 5–10.5)
POTASSIUM SERPL-SCNC: 2.9 MMOL/L (ref 3.5–5.1)
POTASSIUM SERPL-SCNC: 3.3 MMOL/L (ref 3.5–5.1)
PROT SERPL-MCNC: 7.6 G/DL (ref 6.4–8.2)
PROT UR STRIP.AUTO-MCNC: ABNORMAL MG/DL
RBC # BLD AUTO: 3.92 M/UL (ref 4.2–5.9)
RBC CLUMPS #/AREA URNS AUTO: 1 /HPF (ref 0–4)
REASON FOR REJECTION: NORMAL
REJECTED TEST: NORMAL
SODIUM SERPL-SCNC: 139 MMOL/L (ref 136–145)
SP GR UR STRIP.AUTO: 1.01 (ref 1–1.03)
TROPONIN, HIGH SENSITIVITY: 11 NG/L (ref 0–22)
TROPONIN, HIGH SENSITIVITY: 14 NG/L (ref 0–22)
UA COMPLETE W REFLEX CULTURE PNL UR: ABNORMAL
UA DIPSTICK W REFLEX MICRO PNL UR: YES
URN SPEC COLLECT METH UR: ABNORMAL
UROBILINOGEN UR STRIP-ACNC: 1 E.U./DL
WBC # BLD AUTO: 8.7 K/UL (ref 4–11)
WBC #/AREA URNS AUTO: 2 /HPF (ref 0–5)

## 2024-11-07 PROCEDURE — 83690 ASSAY OF LIPASE: CPT

## 2024-11-07 PROCEDURE — 2500000003 HC RX 250 WO HCPCS: Performed by: ANESTHESIOLOGY

## 2024-11-07 PROCEDURE — 7100000001 HC PACU RECOVERY - ADDTL 15 MIN: Performed by: SURGERY

## 2024-11-07 PROCEDURE — 7100000000 HC PACU RECOVERY - FIRST 15 MIN: Performed by: SURGERY

## 2024-11-07 PROCEDURE — 2580000003 HC RX 258: Performed by: SURGERY

## 2024-11-07 PROCEDURE — 83880 ASSAY OF NATRIURETIC PEPTIDE: CPT

## 2024-11-07 PROCEDURE — 88307 TISSUE EXAM BY PATHOLOGIST: CPT

## 2024-11-07 PROCEDURE — 84132 ASSAY OF SERUM POTASSIUM: CPT

## 2024-11-07 PROCEDURE — 3600000014 HC SURGERY LEVEL 4 ADDTL 15MIN: Performed by: SURGERY

## 2024-11-07 PROCEDURE — 2720000010 HC SURG SUPPLY STERILE: Performed by: SURGERY

## 2024-11-07 PROCEDURE — 85025 COMPLETE CBC W/AUTO DIFF WBC: CPT

## 2024-11-07 PROCEDURE — 84484 ASSAY OF TROPONIN QUANT: CPT

## 2024-11-07 PROCEDURE — 2000000000 HC ICU R&B

## 2024-11-07 PROCEDURE — 83605 ASSAY OF LACTIC ACID: CPT

## 2024-11-07 PROCEDURE — 0D9670Z DRAINAGE OF STOMACH WITH DRAINAGE DEVICE, VIA NATURAL OR ARTIFICIAL OPENING: ICD-10-PCS | Performed by: INTERNAL MEDICINE

## 2024-11-07 PROCEDURE — 6360000002 HC RX W HCPCS: Performed by: PHYSICIAN ASSISTANT

## 2024-11-07 PROCEDURE — 96374 THER/PROPH/DIAG INJ IV PUSH: CPT

## 2024-11-07 PROCEDURE — 3600000004 HC SURGERY LEVEL 4 BASE: Performed by: SURGERY

## 2024-11-07 PROCEDURE — 0DB80ZZ EXCISION OF SMALL INTESTINE, OPEN APPROACH: ICD-10-PCS | Performed by: SURGERY

## 2024-11-07 PROCEDURE — 74177 CT ABD & PELVIS W/CONTRAST: CPT

## 2024-11-07 PROCEDURE — 99285 EMERGENCY DEPT VISIT HI MDM: CPT

## 2024-11-07 PROCEDURE — 6360000002 HC RX W HCPCS: Performed by: ANESTHESIOLOGY

## 2024-11-07 PROCEDURE — 80053 COMPREHEN METABOLIC PANEL: CPT

## 2024-11-07 PROCEDURE — 2580000003 HC RX 258: Performed by: ANESTHESIOLOGY

## 2024-11-07 PROCEDURE — 93005 ELECTROCARDIOGRAM TRACING: CPT | Performed by: PHYSICIAN ASSISTANT

## 2024-11-07 PROCEDURE — 81001 URINALYSIS AUTO W/SCOPE: CPT

## 2024-11-07 PROCEDURE — 96376 TX/PRO/DX INJ SAME DRUG ADON: CPT

## 2024-11-07 PROCEDURE — 96375 TX/PRO/DX INJ NEW DRUG ADDON: CPT

## 2024-11-07 PROCEDURE — 6360000002 HC RX W HCPCS: Performed by: SURGERY

## 2024-11-07 PROCEDURE — 3700000000 HC ANESTHESIA ATTENDED CARE: Performed by: SURGERY

## 2024-11-07 PROCEDURE — 74018 RADEX ABDOMEN 1 VIEW: CPT

## 2024-11-07 PROCEDURE — 0DN80ZZ RELEASE SMALL INTESTINE, OPEN APPROACH: ICD-10-PCS | Performed by: SURGERY

## 2024-11-07 PROCEDURE — 6360000004 HC RX CONTRAST MEDICATION: Performed by: PHYSICIAN ASSISTANT

## 2024-11-07 PROCEDURE — 83735 ASSAY OF MAGNESIUM: CPT

## 2024-11-07 PROCEDURE — 3700000001 HC ADD 15 MINUTES (ANESTHESIA): Performed by: SURGERY

## 2024-11-07 PROCEDURE — 2709999900 HC NON-CHARGEABLE SUPPLY: Performed by: SURGERY

## 2024-11-07 RX ORDER — ONDANSETRON 2 MG/ML
INJECTION INTRAMUSCULAR; INTRAVENOUS
Status: DISCONTINUED | OUTPATIENT
Start: 2024-11-07 | End: 2024-11-08 | Stop reason: SDUPTHER

## 2024-11-07 RX ORDER — SUCCINYLCHOLINE CHLORIDE 20 MG/ML
INJECTION INTRAMUSCULAR; INTRAVENOUS
Status: DISCONTINUED | OUTPATIENT
Start: 2024-11-07 | End: 2024-11-08 | Stop reason: SDUPTHER

## 2024-11-07 RX ORDER — VENLAFAXINE HYDROCHLORIDE 150 MG/1
150 CAPSULE, EXTENDED RELEASE ORAL DAILY
Status: ON HOLD | COMMUNITY

## 2024-11-07 RX ORDER — ONDANSETRON 2 MG/ML
4 INJECTION INTRAMUSCULAR; INTRAVENOUS EVERY 6 HOURS PRN
Status: CANCELLED | OUTPATIENT
Start: 2024-11-07

## 2024-11-07 RX ORDER — LIDOCAINE HYDROCHLORIDE 20 MG/ML
INJECTION, SOLUTION EPIDURAL; INFILTRATION; INTRACAUDAL; PERINEURAL
Status: DISCONTINUED | OUTPATIENT
Start: 2024-11-07 | End: 2024-11-08 | Stop reason: SDUPTHER

## 2024-11-07 RX ORDER — CEFAZOLIN 2 G/1
INJECTION, POWDER, FOR SOLUTION INTRAMUSCULAR; INTRAVENOUS
Status: DISPENSED
Start: 2024-11-07 | End: 2024-11-08

## 2024-11-07 RX ORDER — FENTANYL CITRATE 50 UG/ML
INJECTION, SOLUTION INTRAMUSCULAR; INTRAVENOUS
Status: DISCONTINUED | OUTPATIENT
Start: 2024-11-07 | End: 2024-11-08 | Stop reason: SDUPTHER

## 2024-11-07 RX ORDER — ONDANSETRON 2 MG/ML
4 INJECTION INTRAMUSCULAR; INTRAVENOUS EVERY 6 HOURS PRN
Status: DISCONTINUED | OUTPATIENT
Start: 2024-11-07 | End: 2024-11-14 | Stop reason: HOSPADM

## 2024-11-07 RX ORDER — POTASSIUM CHLORIDE 1500 MG/1
20 TABLET, EXTENDED RELEASE ORAL DAILY
Status: ON HOLD | COMMUNITY

## 2024-11-07 RX ORDER — DEXAMETHASONE SODIUM PHOSPHATE 4 MG/ML
INJECTION, SOLUTION INTRA-ARTICULAR; INTRALESIONAL; INTRAMUSCULAR; INTRAVENOUS; SOFT TISSUE
Status: DISCONTINUED | OUTPATIENT
Start: 2024-11-07 | End: 2024-11-08 | Stop reason: SDUPTHER

## 2024-11-07 RX ORDER — PROMETHAZINE HYDROCHLORIDE 25 MG/1
12.5 TABLET ORAL EVERY 6 HOURS PRN
Status: CANCELLED | OUTPATIENT
Start: 2024-11-07

## 2024-11-07 RX ORDER — PROPOFOL 10 MG/ML
INJECTION, EMULSION INTRAVENOUS
Status: DISCONTINUED | OUTPATIENT
Start: 2024-11-07 | End: 2024-11-08 | Stop reason: SDUPTHER

## 2024-11-07 RX ORDER — SODIUM CHLORIDE 9 MG/ML
INJECTION, SOLUTION INTRAMUSCULAR; INTRAVENOUS; SUBCUTANEOUS
Status: DISPENSED
Start: 2024-11-07 | End: 2024-11-08

## 2024-11-07 RX ORDER — POTASSIUM CHLORIDE 7.45 MG/ML
10 INJECTION INTRAVENOUS
Status: DISPENSED | OUTPATIENT
Start: 2024-11-08 | End: 2024-11-08

## 2024-11-07 RX ORDER — METRONIDAZOLE 500 MG/100ML
500 INJECTION, SOLUTION INTRAVENOUS ONCE
Status: COMPLETED | OUTPATIENT
Start: 2024-11-07 | End: 2024-11-07

## 2024-11-07 RX ORDER — MORPHINE SULFATE 4 MG/ML
4 INJECTION, SOLUTION INTRAMUSCULAR; INTRAVENOUS EVERY 30 MIN PRN
Status: DISCONTINUED | OUTPATIENT
Start: 2024-11-07 | End: 2024-11-12

## 2024-11-07 RX ORDER — IOPAMIDOL 755 MG/ML
75 INJECTION, SOLUTION INTRAVASCULAR
Status: COMPLETED | OUTPATIENT
Start: 2024-11-07 | End: 2024-11-07

## 2024-11-07 RX ORDER — ROCURONIUM BROMIDE 10 MG/ML
INJECTION, SOLUTION INTRAVENOUS
Status: DISCONTINUED | OUTPATIENT
Start: 2024-11-07 | End: 2024-11-08 | Stop reason: SDUPTHER

## 2024-11-07 RX ADMIN — FENTANYL CITRATE 50 MCG: 50 INJECTION, SOLUTION INTRAMUSCULAR; INTRAVENOUS at 23:59

## 2024-11-07 RX ADMIN — ROCURONIUM BROMIDE 50 MG: 10 INJECTION, SOLUTION INTRAVENOUS at 23:42

## 2024-11-07 RX ADMIN — ONDANSETRON 4 MG: 2 INJECTION, SOLUTION INTRAMUSCULAR; INTRAVENOUS at 21:26

## 2024-11-07 RX ADMIN — MORPHINE SULFATE 4 MG: 4 INJECTION, SOLUTION INTRAMUSCULAR; INTRAVENOUS at 20:10

## 2024-11-07 RX ADMIN — FENTANYL CITRATE 50 MCG: 50 INJECTION, SOLUTION INTRAMUSCULAR; INTRAVENOUS at 23:46

## 2024-11-07 RX ADMIN — PHENYLEPHRINE HYDROCHLORIDE 200 MCG: 10 INJECTION INTRAVENOUS at 23:40

## 2024-11-07 RX ADMIN — MORPHINE SULFATE 4 MG: 4 INJECTION, SOLUTION INTRAMUSCULAR; INTRAVENOUS at 21:24

## 2024-11-07 RX ADMIN — ONDANSETRON 4 MG: 2 INJECTION INTRAMUSCULAR; INTRAVENOUS at 23:35

## 2024-11-07 RX ADMIN — DEXAMETHASONE SODIUM PHOSPHATE 4 MG: 4 INJECTION, SOLUTION INTRAMUSCULAR; INTRAVENOUS at 23:35

## 2024-11-07 RX ADMIN — PROPOFOL 150 MG: 10 INJECTION, EMULSION INTRAVENOUS at 23:35

## 2024-11-07 RX ADMIN — METRONIDAZOLE 500 MG: 500 INJECTION, SOLUTION INTRAVENOUS at 23:29

## 2024-11-07 RX ADMIN — SUCCINYLCHOLINE CHLORIDE 120 MG: 20 INJECTION, SOLUTION INTRAMUSCULAR; INTRAVENOUS at 23:35

## 2024-11-07 RX ADMIN — CEFAZOLIN 2000 MG: 2 INJECTION, POWDER, FOR SOLUTION INTRAMUSCULAR; INTRAVENOUS at 23:29

## 2024-11-07 RX ADMIN — FENTANYL CITRATE 50 MCG: 50 INJECTION, SOLUTION INTRAMUSCULAR; INTRAVENOUS at 23:35

## 2024-11-07 RX ADMIN — SODIUM CHLORIDE: 9 INJECTION, SOLUTION INTRAVENOUS at 23:29

## 2024-11-07 RX ADMIN — IOPAMIDOL 75 ML: 755 INJECTION, SOLUTION INTRAVENOUS at 20:58

## 2024-11-07 RX ADMIN — ONDANSETRON 4 MG: 2 INJECTION, SOLUTION INTRAMUSCULAR; INTRAVENOUS at 20:10

## 2024-11-07 RX ADMIN — LIDOCAINE HYDROCHLORIDE 100 MG: 20 INJECTION, SOLUTION EPIDURAL; INFILTRATION; INTRACAUDAL; PERINEURAL at 23:35

## 2024-11-07 ASSESSMENT — PAIN DESCRIPTION - LOCATION: LOCATION: ABDOMEN

## 2024-11-07 ASSESSMENT — PAIN - FUNCTIONAL ASSESSMENT: PAIN_FUNCTIONAL_ASSESSMENT: 0-10

## 2024-11-07 ASSESSMENT — PAIN SCALES - GENERAL
PAINLEVEL_OUTOF10: 10
PAINLEVEL_OUTOF10: 10
PAINLEVEL_OUTOF10: 7

## 2024-11-07 ASSESSMENT — LIFESTYLE VARIABLES: SMOKING_STATUS: 0

## 2024-11-07 ASSESSMENT — PAIN DESCRIPTION - ORIENTATION: ORIENTATION: MID

## 2024-11-08 ENCOUNTER — APPOINTMENT (OUTPATIENT)
Dept: GENERAL RADIOLOGY | Age: 89
DRG: 330 | End: 2024-11-08
Payer: MEDICARE

## 2024-11-08 LAB
ALBUMIN SERPL-MCNC: 3.6 G/DL (ref 3.4–5)
ALBUMIN/GLOB SERPL: 1.4 {RATIO} (ref 1.1–2.2)
ALP SERPL-CCNC: 60 U/L (ref 40–129)
ALT SERPL-CCNC: 12 U/L (ref 10–40)
ANION GAP SERPL CALCULATED.3IONS-SCNC: 9 MMOL/L (ref 3–16)
AST SERPL-CCNC: 22 U/L (ref 15–37)
BASOPHILS # BLD: 0 K/UL (ref 0–0.2)
BASOPHILS NFR BLD: 0.1 %
BILIRUB SERPL-MCNC: 0.4 MG/DL (ref 0–1)
BUN SERPL-MCNC: 15 MG/DL (ref 7–20)
CALCIUM SERPL-MCNC: 9.6 MG/DL (ref 8.3–10.6)
CHLORIDE SERPL-SCNC: 104 MMOL/L (ref 99–110)
CO2 SERPL-SCNC: 27 MMOL/L (ref 21–32)
CREAT SERPL-MCNC: 1 MG/DL (ref 0.8–1.3)
DEPRECATED RDW RBC AUTO: 12.8 % (ref 12.4–15.4)
EKG ATRIAL RATE: 77 BPM
EKG DIAGNOSIS: NORMAL
EKG P AXIS: 75 DEGREES
EKG P-R INTERVAL: 178 MS
EKG Q-T INTERVAL: 434 MS
EKG QRS DURATION: 122 MS
EKG QTC CALCULATION (BAZETT): 491 MS
EKG R AXIS: -30 DEGREES
EKG T AXIS: 46 DEGREES
EKG VENTRICULAR RATE: 77 BPM
EOSINOPHIL # BLD: 0 K/UL (ref 0–0.6)
EOSINOPHIL NFR BLD: 0 %
FERRITIN SERPL IA-MCNC: 344 NG/ML (ref 30–400)
FOLATE SERPL-MCNC: 12.6 NG/ML (ref 4.78–24.2)
GFR SERPLBLD CREATININE-BSD FMLA CKD-EPI: 72 ML/MIN/{1.73_M2}
GLUCOSE SERPL-MCNC: 158 MG/DL (ref 70–99)
HCT VFR BLD AUTO: 40.3 % (ref 40.5–52.5)
HGB BLD-MCNC: 13.8 G/DL (ref 13.5–17.5)
IRON SATN MFR SERPL: 8 % (ref 20–50)
IRON SERPL-MCNC: 15 UG/DL (ref 59–158)
LYMPHOCYTES # BLD: 0.2 K/UL (ref 1–5.1)
LYMPHOCYTES NFR BLD: 5.2 %
MCH RBC QN AUTO: 32.9 PG (ref 26–34)
MCHC RBC AUTO-ENTMCNC: 34.4 G/DL (ref 31–36)
MCV RBC AUTO: 95.8 FL (ref 80–100)
MONOCYTES # BLD: 0.5 K/UL (ref 0–1.3)
MONOCYTES NFR BLD: 11.3 %
NEUTROPHILS # BLD: 3.4 K/UL (ref 1.7–7.7)
NEUTROPHILS NFR BLD: 83.4 %
PLATELET # BLD AUTO: 233 K/UL (ref 135–450)
PMV BLD AUTO: 8.7 FL (ref 5–10.5)
POTASSIUM SERPL-SCNC: 4.1 MMOL/L (ref 3.5–5.1)
PROT SERPL-MCNC: 6.2 G/DL (ref 6.4–8.2)
RBC # BLD AUTO: 4.2 M/UL (ref 4.2–5.9)
SODIUM SERPL-SCNC: 140 MMOL/L (ref 136–145)
TIBC SERPL-MCNC: 186 UG/DL (ref 260–445)
VIT B12 SERPL-MCNC: 367 PG/ML (ref 211–911)
WBC # BLD AUTO: 4.1 K/UL (ref 4–11)

## 2024-11-08 PROCEDURE — 1200000000 HC SEMI PRIVATE

## 2024-11-08 PROCEDURE — 97530 THERAPEUTIC ACTIVITIES: CPT

## 2024-11-08 PROCEDURE — 82746 ASSAY OF FOLIC ACID SERUM: CPT

## 2024-11-08 PROCEDURE — 6370000000 HC RX 637 (ALT 250 FOR IP): Performed by: NURSE PRACTITIONER

## 2024-11-08 PROCEDURE — 6360000002 HC RX W HCPCS: Performed by: NURSE PRACTITIONER

## 2024-11-08 PROCEDURE — 6360000002 HC RX W HCPCS: Performed by: ANESTHESIOLOGY

## 2024-11-08 PROCEDURE — 83550 IRON BINDING TEST: CPT

## 2024-11-08 PROCEDURE — 6360000002 HC RX W HCPCS: Performed by: SURGERY

## 2024-11-08 PROCEDURE — 2500000003 HC RX 250 WO HCPCS: Performed by: ANESTHESIOLOGY

## 2024-11-08 PROCEDURE — 85025 COMPLETE CBC W/AUTO DIFF WBC: CPT

## 2024-11-08 PROCEDURE — 71045 X-RAY EXAM CHEST 1 VIEW: CPT

## 2024-11-08 PROCEDURE — 97161 PT EVAL LOW COMPLEX 20 MIN: CPT

## 2024-11-08 PROCEDURE — A4217 STERILE WATER/SALINE, 500 ML: HCPCS | Performed by: SURGERY

## 2024-11-08 PROCEDURE — 2580000003 HC RX 258: Performed by: SURGERY

## 2024-11-08 PROCEDURE — 6360000002 HC RX W HCPCS: Performed by: INTERNAL MEDICINE

## 2024-11-08 PROCEDURE — 2580000003 HC RX 258: Performed by: INTERNAL MEDICINE

## 2024-11-08 PROCEDURE — APPNB30 APP NON BILLABLE TIME 0-30 MINS: Performed by: NURSE PRACTITIONER

## 2024-11-08 PROCEDURE — 83540 ASSAY OF IRON: CPT

## 2024-11-08 PROCEDURE — 2500000003 HC RX 250 WO HCPCS: Performed by: NURSE PRACTITIONER

## 2024-11-08 PROCEDURE — 80053 COMPREHEN METABOLIC PANEL: CPT

## 2024-11-08 PROCEDURE — 82607 VITAMIN B-12: CPT

## 2024-11-08 PROCEDURE — 97165 OT EVAL LOW COMPLEX 30 MIN: CPT

## 2024-11-08 PROCEDURE — 82728 ASSAY OF FERRITIN: CPT

## 2024-11-08 PROCEDURE — APPSS15 APP SPLIT SHARED TIME 0-15 MINUTES: Performed by: NURSE PRACTITIONER

## 2024-11-08 PROCEDURE — 93010 ELECTROCARDIOGRAM REPORT: CPT | Performed by: INTERNAL MEDICINE

## 2024-11-08 RX ORDER — SODIUM CHLORIDE 0.9 % (FLUSH) 0.9 %
10 SYRINGE (ML) INJECTION PRN
Status: DISCONTINUED | OUTPATIENT
Start: 2024-11-08 | End: 2024-11-14 | Stop reason: HOSPADM

## 2024-11-08 RX ORDER — NICOTINE 21 MG/24HR
1 PATCH, TRANSDERMAL 24 HOURS TRANSDERMAL DAILY PRN
Status: DISCONTINUED | OUTPATIENT
Start: 2024-11-08 | End: 2024-11-08

## 2024-11-08 RX ORDER — HYDRALAZINE HYDROCHLORIDE 20 MG/ML
10 INJECTION INTRAMUSCULAR; INTRAVENOUS
Status: DISCONTINUED | OUTPATIENT
Start: 2024-11-08 | End: 2024-11-08 | Stop reason: HOSPADM

## 2024-11-08 RX ORDER — NALOXONE HYDROCHLORIDE 0.4 MG/ML
INJECTION, SOLUTION INTRAMUSCULAR; INTRAVENOUS; SUBCUTANEOUS PRN
Status: DISCONTINUED | OUTPATIENT
Start: 2024-11-08 | End: 2024-11-08 | Stop reason: HOSPADM

## 2024-11-08 RX ORDER — HYDROMORPHONE HYDROCHLORIDE 2 MG/ML
0.5 INJECTION, SOLUTION INTRAMUSCULAR; INTRAVENOUS; SUBCUTANEOUS EVERY 5 MIN PRN
Status: DISCONTINUED | OUTPATIENT
Start: 2024-11-08 | End: 2024-11-08 | Stop reason: HOSPADM

## 2024-11-08 RX ORDER — SODIUM CHLORIDE 0.9 % (FLUSH) 0.9 %
10 SYRINGE (ML) INJECTION EVERY 12 HOURS SCHEDULED
Status: DISCONTINUED | OUTPATIENT
Start: 2024-11-08 | End: 2024-11-14 | Stop reason: HOSPADM

## 2024-11-08 RX ORDER — ENOXAPARIN SODIUM 100 MG/ML
40 INJECTION SUBCUTANEOUS DAILY
Status: DISCONTINUED | OUTPATIENT
Start: 2024-11-08 | End: 2024-11-14 | Stop reason: HOSPADM

## 2024-11-08 RX ORDER — ACETAMINOPHEN 650 MG/1
650 SUPPOSITORY RECTAL EVERY 6 HOURS PRN
Status: DISCONTINUED | OUTPATIENT
Start: 2024-11-08 | End: 2024-11-14 | Stop reason: HOSPADM

## 2024-11-08 RX ORDER — MAGNESIUM HYDROXIDE 1200 MG/15ML
LIQUID ORAL CONTINUOUS PRN
Status: COMPLETED | OUTPATIENT
Start: 2024-11-08 | End: 2024-11-08

## 2024-11-08 RX ORDER — SODIUM CHLORIDE 9 MG/ML
INJECTION, SOLUTION INTRAVENOUS
Status: DISCONTINUED | OUTPATIENT
Start: 2024-11-07 | End: 2024-11-08 | Stop reason: SDUPTHER

## 2024-11-08 RX ORDER — SODIUM CHLORIDE 9 MG/ML
INJECTION, SOLUTION INTRAVENOUS PRN
Status: DISCONTINUED | OUTPATIENT
Start: 2024-11-08 | End: 2024-11-14 | Stop reason: HOSPADM

## 2024-11-08 RX ORDER — ONDANSETRON 2 MG/ML
4 INJECTION INTRAMUSCULAR; INTRAVENOUS
Status: DISCONTINUED | OUTPATIENT
Start: 2024-11-08 | End: 2024-11-08 | Stop reason: HOSPADM

## 2024-11-08 RX ORDER — SODIUM CHLORIDE 9 MG/ML
INJECTION, SOLUTION INTRAVENOUS CONTINUOUS
Status: ACTIVE | OUTPATIENT
Start: 2024-11-08 | End: 2024-11-08

## 2024-11-08 RX ORDER — LABETALOL HYDROCHLORIDE 5 MG/ML
10 INJECTION, SOLUTION INTRAVENOUS
Status: DISCONTINUED | OUTPATIENT
Start: 2024-11-08 | End: 2024-11-08 | Stop reason: HOSPADM

## 2024-11-08 RX ORDER — DEXTROSE MONOHYDRATE, SODIUM CHLORIDE, AND POTASSIUM CHLORIDE 50; 1.49; 4.5 G/1000ML; G/1000ML; G/1000ML
INJECTION, SOLUTION INTRAVENOUS CONTINUOUS
Status: DISCONTINUED | OUTPATIENT
Start: 2024-11-08 | End: 2024-11-12

## 2024-11-08 RX ORDER — HYDROMORPHONE HYDROCHLORIDE 2 MG/ML
0.5 INJECTION, SOLUTION INTRAMUSCULAR; INTRAVENOUS; SUBCUTANEOUS
Status: DISCONTINUED | OUTPATIENT
Start: 2024-11-08 | End: 2024-11-12

## 2024-11-08 RX ORDER — ACETAMINOPHEN 325 MG/1
650 TABLET ORAL EVERY 6 HOURS PRN
Status: DISCONTINUED | OUTPATIENT
Start: 2024-11-08 | End: 2024-11-14 | Stop reason: HOSPADM

## 2024-11-08 RX ORDER — HYDROMORPHONE HYDROCHLORIDE 2 MG/ML
0.25 INJECTION, SOLUTION INTRAMUSCULAR; INTRAVENOUS; SUBCUTANEOUS
Status: DISCONTINUED | OUTPATIENT
Start: 2024-11-08 | End: 2024-11-12

## 2024-11-08 RX ADMIN — PHENYLEPHRINE HYDROCHLORIDE 300 MCG: 10 INJECTION INTRAVENOUS at 00:27

## 2024-11-08 RX ADMIN — SUGAMMADEX 200 MG: 100 INJECTION, SOLUTION INTRAVENOUS at 00:35

## 2024-11-08 RX ADMIN — SODIUM CHLORIDE: 9 INJECTION, SOLUTION INTRAVENOUS at 05:45

## 2024-11-08 RX ADMIN — PHENYLEPHRINE HYDROCHLORIDE 200 MCG: 10 INJECTION INTRAVENOUS at 00:00

## 2024-11-08 RX ADMIN — SODIUM CHLORIDE, PRESERVATIVE FREE 40 MG: 5 INJECTION INTRAVENOUS at 02:00

## 2024-11-08 RX ADMIN — HYDROMORPHONE HYDROCHLORIDE 0.25 MG: 2 INJECTION, SOLUTION INTRAMUSCULAR; INTRAVENOUS; SUBCUTANEOUS at 03:43

## 2024-11-08 RX ADMIN — PHENYLEPHRINE HYDROCHLORIDE 200 MCG: 10 INJECTION INTRAVENOUS at 00:05

## 2024-11-08 RX ADMIN — POTASSIUM CHLORIDE 10 MEQ: 10 INJECTION, SOLUTION INTRAVENOUS at 03:45

## 2024-11-08 RX ADMIN — HYDROMORPHONE HYDROCHLORIDE 0.5 MG: 2 INJECTION, SOLUTION INTRAMUSCULAR; INTRAVENOUS; SUBCUTANEOUS at 07:58

## 2024-11-08 RX ADMIN — POTASSIUM CHLORIDE 10 MEQ: 10 INJECTION, SOLUTION INTRAVENOUS at 01:57

## 2024-11-08 RX ADMIN — POTASSIUM CHLORIDE, DEXTROSE MONOHYDRATE AND SODIUM CHLORIDE: 150; 5; 450 INJECTION, SOLUTION INTRAVENOUS at 14:05

## 2024-11-08 RX ADMIN — HYDROMORPHONE HYDROCHLORIDE 0.5 MG: 2 INJECTION, SOLUTION INTRAMUSCULAR; INTRAVENOUS; SUBCUTANEOUS at 17:50

## 2024-11-08 RX ADMIN — POTASSIUM CHLORIDE 10 MEQ: 10 INJECTION, SOLUTION INTRAVENOUS at 05:45

## 2024-11-08 RX ADMIN — Medication 10 ML: at 20:51

## 2024-11-08 RX ADMIN — PHENYLEPHRINE HYDROCHLORIDE 200 MCG: 10 INJECTION INTRAVENOUS at 00:17

## 2024-11-08 RX ADMIN — SODIUM CHLORIDE: 9 INJECTION, SOLUTION INTRAVENOUS at 01:45

## 2024-11-08 RX ADMIN — POTASSIUM CHLORIDE 10 MEQ: 10 INJECTION, SOLUTION INTRAVENOUS at 04:46

## 2024-11-08 RX ADMIN — PHENYLEPHRINE HYDROCHLORIDE 300 MCG: 10 INJECTION INTRAVENOUS at 00:30

## 2024-11-08 RX ADMIN — FENTANYL CITRATE 50 MCG: 50 INJECTION, SOLUTION INTRAMUSCULAR; INTRAVENOUS at 00:17

## 2024-11-08 RX ADMIN — HYDROMORPHONE HYDROCHLORIDE 0.5 MG: 2 INJECTION, SOLUTION INTRAMUSCULAR; INTRAVENOUS; SUBCUTANEOUS at 11:43

## 2024-11-08 RX ADMIN — POTASSIUM CHLORIDE 10 MEQ: 10 INJECTION, SOLUTION INTRAVENOUS at 02:49

## 2024-11-08 RX ADMIN — ENOXAPARIN SODIUM 40 MG: 100 INJECTION SUBCUTANEOUS at 16:03

## 2024-11-08 RX ADMIN — PHENYLEPHRINE HYDROCHLORIDE 200 MCG: 10 INJECTION INTRAVENOUS at 00:08

## 2024-11-08 RX ADMIN — SODIUM CHLORIDE, PRESERVATIVE FREE 40 MG: 5 INJECTION INTRAVENOUS at 14:00

## 2024-11-08 RX ADMIN — PHENOL 1 SPRAY: 1.4 SPRAY ORAL at 17:43

## 2024-11-08 ASSESSMENT — PAIN - FUNCTIONAL ASSESSMENT
PAIN_FUNCTIONAL_ASSESSMENT: ACTIVITIES ARE NOT PREVENTED
PAIN_FUNCTIONAL_ASSESSMENT: ACTIVITIES ARE NOT PREVENTED

## 2024-11-08 ASSESSMENT — PAIN DESCRIPTION - PAIN TYPE: TYPE: SURGICAL PAIN

## 2024-11-08 ASSESSMENT — PAIN SCALES - GENERAL
PAINLEVEL_OUTOF10: 0
PAINLEVEL_OUTOF10: 7
PAINLEVEL_OUTOF10: 0
PAINLEVEL_OUTOF10: 0
PAINLEVEL_OUTOF10: 7
PAINLEVEL_OUTOF10: 7
PAINLEVEL_OUTOF10: 0
PAINLEVEL_OUTOF10: 3
PAINLEVEL_OUTOF10: 0
PAINLEVEL_OUTOF10: 6
PAINLEVEL_OUTOF10: 5
PAINLEVEL_OUTOF10: 5

## 2024-11-08 ASSESSMENT — PAIN DESCRIPTION - ORIENTATION
ORIENTATION: MID
ORIENTATION: MID

## 2024-11-08 ASSESSMENT — PAIN DESCRIPTION - LOCATION
LOCATION: ABDOMEN

## 2024-11-08 ASSESSMENT — PAIN DESCRIPTION - DESCRIPTORS
DESCRIPTORS: ACHING
DESCRIPTORS: ACHING

## 2024-11-08 NOTE — ED PROVIDER NOTES
[facility] EMERGENCY DEPARTMENT  EMERGENCY DEPARTMENT ENCOUNTER        Pt Name: Kevin Blair  MRN: [id number]  Birthdate 1935  Date of evaluation: 11/7/2024  Provider: Beth Hurd PA-C  PCP: Blane Callahan MD  Note Started: 8:22 PM EST 11/7/24       I have seen and evaluated this patient with my supervising physician Oli Burt DO.      CHIEF COMPLAINT       Chief Complaint   Patient presents with    Abdominal Pain     Pt brought in by [facility] from home c/o mid-abdominal pain that comes & goes every 5 minutes. States it lasts roughly a minute or two. Causing him to vomit from the pain. Site where pain is, is tender to the touch. States he has hx of diverticulitis but this feels different.        HISTORY OF PRESENT ILLNESS: 1 or more Elements     History From: Patient  Limitations to history : None    Kevin Blair is a 89 y.o. male who presents to the emergency department today for evaluation for concerns of upper abdominal pain.  The patient reports that symptoms began around 12 AM this afternoon.  Patient reports that he is lactose intolerant, and he reports that he put milk in his abdomen initially thought that his pain could be due to this.  The patient reports that his pain is to his upper abdomen, otherwise does not radiate.  He reports that it is sharp, cramping and noted rated as a 10/10.  Patient is nauseous and has had several episodes of emesis, he denies any bilious emesis, hematemesis or coffee-ground emesis.  He has no fevers.  No diarrhea.  There is no chest pain or shortness of breath.  He has no dysuria hematuria.  Patient reports that he has had a colon resection many years ago due to history of diverticulitis.  No other PSHx to the abdomen.  He reports his pain today feels different than diverticulitis    Nursing Notes were all reviewed and agreed with or any disagreements were addressed in the HPI.    REVIEW OF SYSTEMS :      Review of Systems  Used   Substance Use Topics    Alcohol use: No    Drug use: No       SCREENINGS          Mckenzie Coma Scale  Eye Opening: Spontaneous  Best Verbal Response: Oriented  Best Motor Response: Obeys commands  Mckenzie Coma Scale Score: 15                        CIWA Assessment  BP: (!) 153/73  Pulse: 84             PHYSICAL EXAM  1 or more Elements     ED Triage Vitals [11/07/24 1918]   BP Systolic BP Percentile Diastolic BP Percentile Temp Temp Source Pulse Respirations SpO2   (!) 143/77 -- -- 97.4 °F (36.3 °C) Oral 84 13 100 %      Height Weight - Scale         1.753 m (5' 9\") 85.7 kg (189 lb)             Physical Exam  Vitals and nursing note reviewed.   Constitutional:       Appearance: He is well-developed. He is not diaphoretic.      Comments: Appears to be uncomfortable although is in no acute distress   HENT:      Head: Normocephalic and atraumatic.      Right Ear: External ear normal.      Left Ear: External ear normal.      Nose: Nose normal.   Eyes:      General:         Right eye: No discharge.         Left eye: No discharge.   Neck:      Trachea: No tracheal deviation.   Cardiovascular:      Rate and Rhythm: Normal rate and regular rhythm.   Pulmonary:      Effort: Pulmonary effort is normal. No respiratory distress.      Breath sounds: Normal breath sounds. No wheezing or rales.   Abdominal:      General: Bowel sounds are normal. There is no distension.      Palpations: Abdomen is soft.      Tenderness: There is abdominal tenderness in the right upper quadrant, epigastric area and left upper quadrant. There is no guarding or rebound.   Musculoskeletal:         General: Normal range of motion.      Cervical back: Normal range of motion and neck supple.   Skin:     General: Skin is warm and dry.   Neurological:      Mental Status: He is alert and oriented to person, place, and time.   Psychiatric:         Behavior: Behavior normal.             DIAGNOSTIC RESULTS   LABS:    Labs Reviewed   CBC WITH AUTO

## 2024-11-08 NOTE — PROGRESS NOTES
Pharmacy Home Medication Reconciliation Note    A medication reconciliation has been completed for Kevin Blair 1935    Pharmacy: [facility] [address], [place], [place]  Information provided by: patient    The patient's home medication list is as follows:  No current facility-administered medications on file prior to encounter.     Current Outpatient Medications on File Prior to Encounter   Medication Sig Dispense Refill    potassium chloride (K-TAB) 20 MEQ TBCR extended release tablet Take 1 tablet by mouth daily      venlafaxine (EFFEXOR XR) 150 MG extended release capsule Take 1 capsule by mouth daily      atorvastatin (LIPITOR) 20 MG tablet Take 1 tablet by mouth every evening      metFORMIN (GLUCOPHAGE) 1000 MG tablet Take 1 tablet by mouth Daily with supper      lisinopril (PRINIVIL;ZESTRIL) 20 MG tablet Take 1 tablet by mouth daily      chlorthalidone (HYGROTEN) 25 MG tablet Take 1 tablet by mouth daily. 30 tablet 11    famotidine (PEPCID) 40 MG tablet TAKE 1 TABLET BY MOUTH EVERY DAY IN THE EVENING (Patient not taking: Reported on 11/7/2024) 30 tablet 0    omeprazole (PRILOSEC) 40 MG delayed release capsule Take 1 capsule by mouth daily Take tablet one hour before evening meal (Patient not taking: Reported on 11/7/2024) 30 capsule 3       Patient is no longer taking famotidine or omeprazole.    Of note, patient took all AM meds prior to ED arrival.    Timing of last doses updated.    Thank you,  Elly Freeman, SRIKANTHhT

## 2024-11-08 NOTE — PROGRESS NOTES
Pt admitted to PACU, awakening and following commands, abd incisions CD&I, chandler in place, NGT in place, vss, NSr on tele

## 2024-11-08 NOTE — ANESTHESIA PRE PROCEDURE
Department of Anesthesiology  Preprocedure Note       Name:  Kevin Blair   Age:  89 y.o.  :  1935                                          MRN:  [id number]         Date:  2024      Surgeon: Surgeon(s):  Nghia Sheridan MD    Procedure: Procedure(s):  LAPAROTOMY EXPLORATORY    Medications prior to admission:   Prior to Admission medications    Medication Sig Start Date End Date Taking? Authorizing Provider   famotidine (PEPCID) 40 MG tablet TAKE 1 TABLET BY MOUTH EVERY DAY IN THE EVENING 10/6/20   Rebel Schmidt MD   omeprazole (PRILOSEC) 40 MG delayed release capsule Take 1 capsule by mouth daily Take tablet one hour before evening meal 9/11/20 10/11/20  Rebel Schmidt MD   atorvastatin (LIPITOR) 20 MG tablet Take 20 mg by mouth daily    ProviderKarley MD   metFORMIN (GLUCOPHAGE) 1000 MG tablet Take 1,000 mg by mouth daily (with breakfast)    Provider, MD Karley   lisinopril (PRINIVIL;ZESTRIL) 20 MG tablet Take 20 mg by mouth daily.      Provider, MD Karley   chlorthalidone (HYGROTEN) 25 MG tablet Take 1 tablet by mouth daily. 12   Preston Leavitt MD       Current medications:    Current Facility-Administered Medications   Medication Dose Route Frequency Provider Last Rate Last Admin    morphine injection 4 mg  4 mg IntraVENous Q30 Min PRN Beth Hurd PA-C   4 mg at 24    ondansetron (ZOFRAN) injection 4 mg  4 mg IntraVENous Q6H PRN Beth Hurd PA-C   4 mg at 24     Current Outpatient Medications   Medication Sig Dispense Refill    famotidine (PEPCID) 40 MG tablet TAKE 1 TABLET BY MOUTH EVERY DAY IN THE EVENING 30 tablet 0    omeprazole (PRILOSEC) 40 MG delayed release capsule Take 1 capsule by mouth daily Take tablet one hour before evening meal 30 capsule 3    atorvastatin (LIPITOR) 20 MG tablet Take 20 mg by mouth daily      metFORMIN (GLUCOPHAGE) 1000 MG tablet Take 1,000 mg by mouth daily (with breakfast)      lisinopril

## 2024-11-08 NOTE — BRIEF OP NOTE
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]    Post-Op Diagnosis: Same       Procedure(s):  LAPAROTOMY EXPLORATORY, LYSIS OF ADHESIONS, SMALL BOWEL RESECTION    Surgeon(s):  Nghia Sheridan MD    Assistant:  Surgical Assistant: Lorena Clark    Anesthesia: General    Estimated Blood Loss (mL): Minimal    Complications: None    Specimens:   ID Type Source Tests Collected by Time Destination   A : A)SMALL BOWEL Tissue Tissue SURGICAL PATHOLOGY Nghia Sheridan MD 11/8/2024 0017        Implants:  * No implants in log *      Drains:   NG/OG/NJ/NE Tube Nasogastric Left nostril (Active)   Surrounding Skin Clean, dry & intact 11/07/24 2220       Urinary Catheter 11/07/24 Chavez (Active)       Findings:  Infection Present At Time Of Surgery (PATOS) (choose all levels that have infection present):  - Organ Space infection (below fascia) present as evidenced by fluid consistent with infection  Other Findings: 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  Approximately 75 cm of nonviable small bowel resected.  Primary anastomosis performed.    Electronically signed by Nghia Sheridan MD on 11/8/2024 at 12:36 AM

## 2024-11-08 NOTE — PROGRESS NOTES
[facility]        Progress Note    Patient Name: Kevin Blair  MRN: [id number]  YOB: 1935  Date of Evaluation: 11/8/2024    Subjective:  No acute events overnight  Pain controlled   No nausea or vomiting, NGT in place  No flatus or BM  Resting in bed at this time, was up to chair for couple of hours earlier today    Post-Operative Day #1      Vital Signs:  Patient Vitals for the past 24 hrs:   BP Temp Temp src Pulse Resp SpO2 Height Weight   11/08/24 1143 -- -- -- -- 12 -- -- --   11/08/24 1000 (!) 98/54 97.8 °F (36.6 °C) Temporal 85 10 94 % -- --   11/08/24 0900 112/69 -- -- 89 11 96 % -- --   11/08/24 0828 -- -- -- -- 15 -- -- --   11/08/24 0800 120/64 -- -- 87 16 96 % -- --   11/08/24 0758 -- -- -- -- 16 -- -- --   11/08/24 0700 120/63 -- -- 84 12 96 % -- --   11/08/24 0630 122/64 -- -- 82 13 96 % -- --   11/08/24 0600 117/62 -- -- 80 13 97 % -- --   11/08/24 0530 115/61 -- -- 79 13 96 % -- --   11/08/24 0500 109/61 -- -- 78 10 96 % -- --   11/08/24 0430 110/61 -- -- 77 10 95 % -- --   11/08/24 0400 111/61 97 °F (36.1 °C) Temporal 77 12 95 % -- --   11/08/24 0343 -- -- -- 77 15 96 % -- --   11/08/24 0330 114/67 -- -- 75 13 98 % -- 57.3 kg (126 lb 5.2 oz)   11/08/24 0300 (!) 106/57 -- -- 72 13 97 % -- --   11/08/24 0230 107/63 -- -- 82 18 95 % -- --   11/08/24 0215 (!) 100/57 -- -- 80 13 95 % -- --   11/08/24 0200 (!) 97/56 -- -- 77 12 94 % -- --   11/08/24 0145 (!) 100/58 -- -- 79 11 95 % -- --   11/08/24 0130 104/69 96.8 °F (36 °C) Temporal 80 13 93 % -- 54.7 kg (120 lb 9.5 oz)   11/08/24 0115 119/68 -- -- 87 14 93 % -- --   11/08/24 0112 -- -- -- 85 15 94 % -- --   11/08/24 0105 125/64 97.2 °F (36.2 °C) Temporal 87 15 99 % -- --   11/08/24 0100 137/70 -- -- 85 13 99 % -- --   11/08/24 0055 137/69 -- -- 81 12 100 % -- --   11/08/24 0048 (!) 138/59 97.1 °F (36.2 °C) Temporal 83 14 100 % -- --   11/07/24 2145 (!) 153/73 -- -- 84 14 96 % -- --   11/07/24  and pancreas are unremarkable.  The kidneys perfuse contrast symmetrically without hydronephrosis.  Bilateral simple renal cysts are present, and no follow-up is recommended. GI/Bowel: There are multiple dilated loops of small bowel.  There is twisting of the mesentery associated with the transition point.  The amount of mucosal enhancement within the dilated loops of bowel is diminished. Pelvis: The urinary bladder is unremarkable.  Metallic clips or calcifications at the prostate. Peritoneum/Retroperitoneum: Mild atherosclerosis.  Small free pelvic fluid. No free intraperitoneal air. Bones/Soft Tissues: Left hip arthroplasty.     1. Small bowel obstruction with twisting mesentery and findings concerning for closed loop obstruction such as internal hernia.  The amount of mucosal enhancement within the dilated loops of bowel is diminished, concerning for vascular compromise. 2. Small free pelvic fluid. Critical results were called by Dr. Dominick Barrera to Beth Guanall on 11/7/2024 at 21:44.      Scheduled Meds:   sodium chloride flush  10 mL IntraVENous 2 times per day    pantoprazole (PROTONIX) 40 mg in sodium chloride (PF) 0.9 % 10 mL injection  40 mg IntraVENous Q12H     Continuous Infusions:   sodium chloride       PRN Meds:.sodium chloride flush, sodium chloride, melatonin, acetaminophen **OR** acetaminophen, HYDROmorphone **OR** HYDROmorphone, morphine, ondansetron      Assessment:  89 y.o. male admitted with   1. SBO (small bowel obstruction) (HCC)    2. Upper abdominal pain    3. 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 Pain controlled, incisional tenderness only on exam, wound bed is clean, no nausea or vomiting, NGT in place, no flatus or BM, vitals/labs stable; continued supportive care, keep Chavez in place today--will likely remove tomorrow, local wound care--timing of

## 2024-11-08 NOTE — PROGRESS NOTES
[facility]    11/8/2024 7:03 AM        Name: Kevin Blair .              Admitted: 11/7/2024  Primary Care Provider: Blane Callahan MD (Tel: [phone])      Subjective:  .    Pt seen this am while resting in bed.  He is alert and pleasant.  Reports level 7 operative pain.  He is concerned about his dog at home.       POD # 1 from emergent exploratory lap with lysis of adhesions for small bowel obstruction.     Reviewed interval ancillary notes    Current Medications  0.9 % sodium chloride infusion, Continuous  sodium chloride flush 0.9 % injection 10 mL, 2 times per day  sodium chloride flush 0.9 % injection 10 mL, PRN  0.9 % sodium chloride infusion, PRN  melatonin tablet 3 mg, Nightly PRN  acetaminophen (TYLENOL) tablet 650 mg, Q6H PRN   Or  acetaminophen (TYLENOL) suppository 650 mg, Q6H PRN  pantoprazole (PROTONIX) 40 mg in sodium chloride (PF) 0.9 % 10 mL injection, Q12H  HYDROmorphone (DILAUDID) injection 0.25 mg, Q3H PRN   Or  HYDROmorphone (DILAUDID) injection 0.5 mg, Q3H PRN  morphine injection 4 mg, Q30 Min PRN  ondansetron (ZOFRAN) injection 4 mg, Q6H PRN  sodium chloride (PF) 0.9 % injection,   ceFAZolin (ANCEF) 2 g injection,         Objective:  /63   Pulse 84   Temp 97 °F (36.1 °C) (Temporal)   Resp 12   Ht 1.753 m (5' 9\")   Wt 57.3 kg (126 lb 5.2 oz)   SpO2 96%   BMI 18.65 kg/m²     Intake/Output Summary (Last 24 hours) at 11/8/2024 0703  Last data filed at 11/8/2024 0700  Gross per 24 hour   Intake 1795.52 ml   Output 650 ml   Net 1145.52 ml      Wt Readings from Last 3 Encounters:   11/08/24 57.3 kg (126 lb 5.2 oz)   06/21/12 83.9 kg (185 lb)   05/02/12 83.9 kg (185 lb)       General appearance:  Appears comfortable, alert and pleasant.  Looks younger than stated age   Eyes: Sclera clear. Pupils equal.  ENT: Moist oral mucosa. Trachea midline, no adenopathy.  Cardiovascular: Regular rhythm, normal S1, S2. No murmur. No edema in lower  wife  3 years ago.  2 children live out of [facility],  [place] and [place]      Diet: Diet NPO  Code:Full Code  DVT PPX MARIELENA Ray - CNP   2024 7:03 AM

## 2024-11-08 NOTE — H&P
Hospital Medicine History & Physical      PCP: Blane Callahan MD    Date of Admission: 11/7/2024    Date of Service: Pt seen/examined on 11/7/2024    Pt seen/examined face to face on and admitted as inpatient with expected LOS to be two days but can change depending on diagnostic work up and treatment response.     Chief Complaint:    Chief Complaint   Patient presents with    Abdominal Pain     Pt brought in by [facility] from home c/o mid-abdominal pain that comes & goes every 5 minutes. States it lasts roughly a minute or two. Causing him to vomit from the pain. Site where pain is, is tender to the touch. States he has hx of diverticulitis but this feels different.             ASSESSMENT AND PLAN:    Active Hospital Problems    Diagnosis Date Noted    SBO (small bowel obstruction) ([facility]) [K56.609] 11/07/2024     SBO:  Evident on CT with concern for vascular compromise  Surgery was consulted in the ED recommended emergent surgery  NG placed in the ED  Surgery on board, appreciated    Hypokalemia, replacing     Normocytic anemia:  Iron panel, Hemoccult ordered and held while in surgery    Chronic medical conditions: Held home medication in the setting of SBO  Hyperlipidemia  Prediabetes  Hypertension    .Due to the above diagnosis makes the patient higher risk for morbidity and mortality requiring testing and treatment      Discussion with the primary ER physician in regards to symptoms, history, physical exam, diagnosis and treatment, collaborative decision was to admit the patient.    Diet: NPO    DVT Prophylaxis: Held pending surgery    Dispo:   Expected LOS of two days      History Of Present Illness:      89 y.o. male who presented to [facility] with past medical history hypertension, hyperlipidemia, arthritis, presented ED with chief complaint of abdominal pain    Patient reported symptoms started suddenly around 12 PM this afternoon reported that she has drank milk and then started having some  Take 1 capsule by mouth daily Take tablet one hour before evening meal  Patient not taking: Reported on 11/7/2024 9/11/20 10/11/20  Rebel Schmidt MD       Allergies:  Patient has no known allergies.    Social History:          TOBACCO:   reports that he quit smoking about 13 years ago. His smoking use included cigarettes. He has never used smokeless tobacco.  ETOH:   reports no history of alcohol use.  E-cigarette/Vaping       Questions Responses    E-cigarette/Vaping Use Never User    Start Date     Passive Exposure     Quit Date     Counseling Given     Comments               Family History:      Family History reviewed with patient, and does not pertain and non-contributory to the current illness    History reviewed. No pertinent family history.    REVIEW OF SYSTEMS:     Constitutional:  No Fever, No Chills, No Night Sweats  ENT/Mouth:  No Nasal Congestion,  No Hoarseness, No new mouth lesion  Eyes:  No Eye Pain, No Redness, No Discharge  Cardiovascular:  No Chest Pain, No Orthopnea, No Palpitations  Respiratory:  No Cough, No Sputum, No Dyspnea  Gastrointestinal: + Vomiting, No Diarrhea, + abdominal pain  Genitourinary: No Urinary Frequency, No Hematuria, No Urinary pain  Musculoskeletal:  No worsening Arthralgias, No worsening Myalgias  Skin:  No new Skin Lesions, No new skin rash  Neuro:  No new weakness, No new numbness.  Psych:  No suicial ideation, No Violence ideation    PHYSICAL EXAM PERFORMED:    BP (!) 153/73   Pulse 84   Temp 97.4 °F (36.3 °C) (Oral)   Resp 14   Ht 1.753 m (5' 9\")   Wt 85.7 kg (189 lb)   SpO2 96%   BMI 27.91 kg/m²     General appearance:  mild acute distress, appears older than stated age  HEENT:   atraumatic, sclera anicteric, Conjunctivae clear.  Neck: Supple,Trachea midline, no goiter  Respiratory:minimal accessory muscle usage, Normal respiratory effort. Clear to auscultation, bilaterally without wheezing  Cardiovascular:  Regular rate and rhythm, capillary refill 2

## 2024-11-08 NOTE — ED PROVIDER NOTES
In addition to the advanced practice provider, I personally saw Kevin Blair and performed a substantive portion of the visit including all aspects of the medical decision making.    Medical Decision Making  Patient seen and evaluated at bedside.  Briefly, patient presenting with periumbilical abdominal pain/tenderness.  Lab workup significant for elevated lactic acid.  CT abdomen/pelvis concerning for bowel obstruction caused by internal hernia with possible bowel ischemia.  Case discussed between MANDY and general surgery.  Per recommendations, NG tube will be placed in the emergency department and patient will be sent for emergent exploratory laparotomy.    The total critical care time I independently spent while evaluating and treating this patient was 17 minutes. This excludes time spent doing separately billable procedures.  This includes time at the bedside, data interpretation, medication management, obtaining critical history from collateral sources if the patient is unable to provide it directly, and physician consultation.  Specifics of interventions taken and potentially life-threatening diagnostic considerations are listed above in the medical decision making.  If this was a shared visit with an MANDY, the time in this attestation is non-concurrent critical care time out of the total shared critical care time provided by the MANDY and myself.     EKG  Normal sinus rhythm with no acute ST elevations.  Ventricular rate of 77 bpm.  Right bundle branch block.  Diffuse T wave inversions.  No previous, recent ECG to compare to.    SEP-1  Is this patient to be included in the SEP-1 Core Measure due to severe sepsis or septic shock?   No     Exclusion criteria - the patient is NOT to be included for SEP-1 Core Measure due to:  2+ SIRS criteria are not met    Screenings     Mckenzie Coma Scale  Eye Opening: Spontaneous  Best Verbal Response: Oriented  Best Motor Response: Obeys commands  Mckenzie Coma Scale Score:

## 2024-11-08 NOTE — PROGRESS NOTES
Pt arrived from OR to ICU room 3917. Assessment complete and VS obtained/stable. Pt denies any pain. A&O x 4, follows commands appropriately. Appears to be lethargic/tired. On room air, respirations regular/unlabored. Lung sounds clear. NSR on monitor. Abdomen flat/soft with hypoactive bowel sounds. NG tube on L-nares @ 66 cm, new orders received for X-ray for verification (Per OR nurse, it hasn't been verified). Peripheral pulses palpable. Skin remains intact. Dressing over mid-abdominal incision remains intact, ice packs applied. Pt has a chandler catheter/patent. Call light within reach and standard safety measures in place. Denies any needs.

## 2024-11-08 NOTE — PROGRESS NOTES
Daily Living  Grooming: setup assistance  Grooming Comments: to use mouth swab   Lower Extremity Dressing: maximum assistance to thread brief and moi socks from bed  Patient denies any other ADL needs at this time.   Instrumental Activities of Daily Living  No IADL completed on this date.    Functional Mobility  Bed Mobility:  Supine to Sit: minimal assistance  Scooting: stand by assistance  Comments: use of bed rail, extra time, cuing for log roll technique   Transfers:  Sit to stand transfer:contact guard assistance  Stand to sit transfer: contact guard assistance  Stand step transfer: contact guard assistance  Comments: use of rolling walker   Functional Mobility  No functional mobility completed on this date secondary to unable - due to increased pain and fatigue .  Balance:  Static Sitting Balance: good: independent with functional balance in unsupported position  Dynamic Sitting Balance: good: independent with functional balance in unsupported position  Static Standing Balance: fair (-): maintains balance at CGA with use of UE support  Dynamic Standing Balance: fair (-): maintains balance at CGA with use of UE support  Comments:    Other Therapeutic Interventions    Functional Outcomes  AM-PAC Inpatient Daily Activity Raw Score: 16                                    Cognition  WFL  Orientation:    alert and oriented x 4  Command Following:   WFL     Education  Barriers To Learning: none  Patient Education: patient educated on goals, OT role and benefits, plan of care, precautions, ADL adaptive strategies, discharge recommendations  Learning Assessment:  patient verbalizes understanding, would benefit from continued reinforcement    Assessment  Activity Tolerance: good - limited by fatigue and pain - /59 in sitting and after transfer to chair   Impairments Requiring Therapeutic Intervention: decreased functional mobility, decreased ADL status, decreased endurance, decreased balance, increased  pain  Prognosis: good  Clinical Assessment: Patient typically independent with ADLs and IADLs. Post op from surgery for SBO. Currently min A bed mobility, CGA for SPTs, max A LB dressing. Given PLOF and performance today expect patient to progress quickly to independence with ADLs and mobility. Patient presents with the above deficits and would benefit from continued skilled OT to address return to PLOF.     Safety Interventions: patient left in chair, chair alarm in place, call light within reach, and nurse notified    Plan  Frequency: 3-5 x/per week  Current Treatment Recommendations: strengthening, balance training, functional mobility training, transfer training, patient/caregiver education, ADL/self-care training, and IADL training    Goals  Patient Goals: to return home    Short Term Goals:  Time Frame: d/c  Patient will complete upper body ADL at modified independent   Patient will complete lower body ADL at minimal assistance   Patient will complete toileting at supervision   Patient will complete grooming at modified independent   Patient will complete functional transfers at supervision   Patient will complete functional mobility at supervision     Above goals reviewed on 11/8/2024.  All goals are ongoing at this time unless indicated above.       Therapy Session Time     Individual Group Co-treatment   Time In    0849   Time Out    0928   Minutes    39        Timed Code Treatment Minutes:   24  Total Treatment Minutes:  39       Electronically Signed By: Alfredo Grey OT

## 2024-11-08 NOTE — PROGRESS NOTES
[facility] - Inpatient Rehabilitation Department   Phone: [phone]    Physical Therapy    [x] Initial Evaluation            [] Daily Treatment Note         [] Discharge Summary      Patient: Kevin Blair   : 1935   MRN: [id number]   Date of Service:  2024  Admitting Diagnosis: SBO (small bowel obstruction) (HCC)  Current Admission Summary: 89 y.o. male who presented to [facility] with past medical history hypertension, hyperlipidemia, arthritis, presented ED with chief complaint of abdominal pain. Pt reported symptoms started suddenly around 12 PM on  reported that he drank milk and then started having some abdominal pain epigastric right upper quadrant progressively worsening, would go up to 10/10 with cramping and distention. Patient also admits to having nausea and vomiting reported that the vomit was nonbloody or coffee-ground withno fever or diarrhea chest pain shortness of breath or dysuria      SBO s/p ex lap, lysis of adhesions, small bowel resection    Past Medical History:  has a past medical history of Arthritis, Diverticulitis, Hyperlipidemia, Hypertension, and S/P colon resection.  Past Surgical History:  has a past surgical history that includes TURP; colectomy; Tonsillectomy; Cataract removal; shoulder surgery; Knee arthroscopy; Carpal tunnel release; Total hip arthroplasty (2012); and Small intestine surgery (N/A, 2024).  Discharge Recommendations: Kevin Blair scored a 18/24 on the AM-PAC short mobility form. Current research shows that an AM-PAC score of 18 or greater is typically associated with a discharge to the patient's home setting. Based on the patient's AM-PAC score and their current functional mobility deficits, it is recommended that the patient have 2-3 sessions per week of Physical Therapy at d/c to increase the patient's independence.  At this time, this patient demonstrates the endurance and safety to discharge home with          Cognition  WFL  Orientation:    alert and oriented x 4  Command Following:   WFL    Education  Barriers To Learning: none  Patient Education: patient educated on goals, PT role and benefits, plan of care, functional mobility training, proper use of assistive device/equipment, transfer training, discharge recommendations  Learning Assessment:  patient verbalizes and demonstrates understanding    Assessment  Activity Tolerance: Good; /59 after transfer; anticipate that pt will progress well with mobility efforts as he is typically indep with all functional mobility, self care tasks, and lives independently  Impairments Requiring Therapeutic Intervention: decreased functional mobility, decreased endurance, increased pain  Prognosis: good  Clinical Assessment: 88 yo male admitted with SBO s/p small bowel resection and ex lap. Pt is typically indep at home and no use of device. Pt is limited this date by increased pain s/p surgery, but anticipate that he will progress well with mobility. At this time, PT recommends HHPT and initial HHPT. Will continue to monitor progress to provide recommendation for assistive device.  Safety Interventions: patient left in chair, chair alarm in place, call light within reach, nurse notified, and friend, Kale, present at end of session    Plan  Frequency: 3-5 x/per week  Current Treatment Recommendations: strengthening, balance training, functional mobility training, transfer training, gait training, stair training, endurance training, and equipment evaluation/education    Goals  Patient Goals: To return home   Short Term Goals:  Time Frame: Discharge  Patient will complete bed mobility at [facility]   Patient will complete transfers at [facility]   Patient will ambulate 300 ft with use of LRAD at [facility]  Patient will ascend/descend 13 stairs with (L) ascending handrail at supervision  Patient will complete car transfer at [facility]  Patient to maintain

## 2024-11-08 NOTE — PROGRESS NOTES
Pt awake and following commands, denies pain meds, VSS, weaned to RA, abd incision CD&I, chandler in place, per anesthesia ok to transfer , 2bags of belongings from ED transferred up with pt. Friend ( Kale) sent to ICU waiting room

## 2024-11-08 NOTE — ANESTHESIA POSTPROCEDURE EVALUATION
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    Leia Phase I:      Leia Phase II:      Anesthesia Post Evaluation    Patient location during evaluation: PACU  Patient participation: complete - patient participated  Level of consciousness: awake and alert  Airway patency: patent  Nausea & Vomiting: no vomiting and no nausea  Cardiovascular status: hemodynamically stable  Respiratory status: acceptable  Hydration status: stable  Pain management: adequate    No notable events documented.

## 2024-11-08 NOTE — PROGRESS NOTES
4 Eyes Skin Assessment     NAME:  Kevin Blair  YOB: 1935  MEDICAL RECORD NUMBER:  [id number]    The patient is being assessed for  Admission    I agree that at least one RN has performed a thorough Head to Toe Skin Assessment on the patient. ALL assessment sites listed below have been assessed.      Areas assessed by both nurses:    Head, Face, Ears, Shoulders, Back, Chest, Arms, Elbows, Hands, Sacrum. Buttock, Coccyx, Ischium, Legs. Feet and Heels, and Under Medical Devices         Does the Patient have a Wound? No noted wound(s)       Jonnie Prevention initiated by RN: Yes  Wound Care Orders initiated by RN: No    Pressure Injury (Stage 3,4, Unstageable, DTI, NWPT, and Complex wounds) if present, place Wound referral order by RN under : No    New Ostomies, if present place, Ostomy referral order under : No     Nurse 1 eSignature: Electronically signed by Valerie Head RN on 11/8/24 at 2:15 AM EST    **SHARE this note so that the co-signing nurse can place an eSignature**    Nurse 2 eSignature: Electronically signed by Ahsan Araujo RN on 11/8/24 at 2:17 AM EST

## 2024-11-08 NOTE — CONSULTS
Not on file   Intimate Partner Violence: Unknown (1/23/2024)    Received from [facility] and [facility]    Interpersonal Safety     Feel physically or emotionally unsafe where currently live: Not on file     Harm by anyone: Not on file     Emotionally Harmed: Not on file   Housing Stability: Unknown (1/23/2024)    Received from [facility] and [facility]    Housing/Utilities     Worried about losing home: Not on file     Stayed outside house: Not on file     Unable to get utilities: Not on file       Allergies: No Known Allergies    Prior to Admission medications    Medication Sig Start Date End Date Taking? Authorizing Provider   potassium chloride (K-TAB) 20 MEQ TBCR extended release tablet Take 1 tablet by mouth daily   Yes ProviderKarley MD   venlafaxine (EFFEXOR XR) 150 MG extended release capsule Take 1 capsule by mouth daily   Yes Provider, MD Karley   atorvastatin (LIPITOR) 20 MG tablet Take 1 tablet by mouth every evening   Yes Provider, MD Karley   metFORMIN (GLUCOPHAGE) 1000 MG tablet Take 1 tablet by mouth Daily with supper   Yes Provider, MD Karley   lisinopril (PRINIVIL;ZESTRIL) 20 MG tablet Take 1 tablet by mouth daily   Yes Provider, MD Karley   chlorthalidone (HYGROTEN) 25 MG tablet Take 1 tablet by mouth daily. 5/2/12  Yes Preston Leavitt MD   famotidine (PEPCID) 40 MG tablet TAKE 1 TABLET BY MOUTH EVERY DAY IN THE EVENING  Patient not taking: Reported on 11/7/2024 10/6/20   Rebel Schmidt MD   omeprazole (PRILOSEC) 40 MG delayed release capsule Take 1 capsule by mouth daily Take tablet one hour before evening meal  Patient not taking: Reported on 11/7/2024 9/11/20 10/11/20  Rebel Schmidt MD       Active Problems:    * No active hospital problems. *  Resolved Problems:    * No resolved hospital problems. *      Blood pressure (!) 153/73, pulse 84, temperature 97.4 °F (36.3 °C), temperature source Oral, resp. rate 14, height  1.753 m (5' 9\"), weight 85.7 kg (189 lb), SpO2 96%.    Review of Systems   Constitutional:  Positive for activity change and appetite change.   HENT:  Negative for congestion and dental problem.    Eyes:  Negative for discharge and itching.   Respiratory:  Negative for apnea and chest tightness.    Cardiovascular:  Negative for chest pain and leg swelling.   Gastrointestinal:  Positive for abdominal distention, abdominal pain, nausea and vomiting.   Endocrine: Negative for cold intolerance and heat intolerance.   Genitourinary:  Negative for difficulty urinating and dysuria.   Musculoskeletal:  Positive for arthralgias and joint swelling.   Skin:  Negative for color change and pallor.   Allergic/Immunologic: Negative for environmental allergies and food allergies.   Neurological:  Negative for dizziness and facial asymmetry.   Hematological:  Negative for adenopathy. Does not bruise/bleed easily.   Psychiatric/Behavioral:  Negative for agitation and behavioral problems.        Physical Exam  Constitutional:       Appearance: He is well-developed.   HENT:      Head: Normocephalic and atraumatic.      Right Ear: External ear normal.      Left Ear: External ear normal.   Eyes:      Conjunctiva/sclera: Conjunctivae normal.   Cardiovascular:      Rate and Rhythm: Normal rate and regular rhythm.   Pulmonary:      Effort: Pulmonary effort is normal. No respiratory distress.   Abdominal:      General: There is no distension.      Palpations: Abdomen is soft.      Tenderness: There is abdominal tenderness.   Musculoskeletal:         General: Normal range of motion.      Cervical back: Normal range of motion and neck supple.   Skin:     General: Skin is warm and dry.   Neurological:      Mental Status: He is alert and oriented to person, place, and time.   Psychiatric:         Behavior: Behavior normal.     White blood count-8.7  Lactic acid-2.3      Assessment:  Pleasant 89-year-old male who presented to the ED with acute

## 2024-11-09 LAB
ALBUMIN SERPL-MCNC: 3.6 G/DL (ref 3.4–5)
ALBUMIN/GLOB SERPL: 1.2 {RATIO} (ref 1.1–2.2)
ALP SERPL-CCNC: 50 U/L (ref 40–129)
ALT SERPL-CCNC: 12 U/L (ref 10–40)
ANION GAP SERPL CALCULATED.3IONS-SCNC: 10 MMOL/L (ref 3–16)
AST SERPL-CCNC: 27 U/L (ref 15–37)
BASOPHILS # BLD: 0 K/UL (ref 0–0.2)
BASOPHILS NFR BLD: 0.1 %
BILIRUB SERPL-MCNC: 0.4 MG/DL (ref 0–1)
BUN SERPL-MCNC: 21 MG/DL (ref 7–20)
CALCIUM SERPL-MCNC: 10.4 MG/DL (ref 8.3–10.6)
CHLORIDE SERPL-SCNC: 102 MMOL/L (ref 99–110)
CO2 SERPL-SCNC: 26 MMOL/L (ref 21–32)
CREAT SERPL-MCNC: 1 MG/DL (ref 0.8–1.3)
DEPRECATED RDW RBC AUTO: 12.9 % (ref 12.4–15.4)
EOSINOPHIL # BLD: 0 K/UL (ref 0–0.6)
EOSINOPHIL NFR BLD: 0 %
GFR SERPLBLD CREATININE-BSD FMLA CKD-EPI: 72 ML/MIN/{1.73_M2}
GLUCOSE SERPL-MCNC: 123 MG/DL (ref 70–99)
HCT VFR BLD AUTO: 37.3 % (ref 40.5–52.5)
HGB BLD-MCNC: 12.7 G/DL (ref 13.5–17.5)
LYMPHOCYTES # BLD: 0.4 K/UL (ref 1–5.1)
LYMPHOCYTES NFR BLD: 3.8 %
MCH RBC QN AUTO: 32.6 PG (ref 26–34)
MCHC RBC AUTO-ENTMCNC: 33.9 G/DL (ref 31–36)
MCV RBC AUTO: 96.2 FL (ref 80–100)
MONOCYTES # BLD: 0.5 K/UL (ref 0–1.3)
MONOCYTES NFR BLD: 5.3 %
NEUTROPHILS # BLD: 8.4 K/UL (ref 1.7–7.7)
NEUTROPHILS NFR BLD: 90.8 %
PLATELET # BLD AUTO: 188 K/UL (ref 135–450)
PMV BLD AUTO: 8.5 FL (ref 5–10.5)
POTASSIUM SERPL-SCNC: 3.7 MMOL/L (ref 3.5–5.1)
PROT SERPL-MCNC: 6.5 G/DL (ref 6.4–8.2)
RBC # BLD AUTO: 3.88 M/UL (ref 4.2–5.9)
SODIUM SERPL-SCNC: 138 MMOL/L (ref 136–145)
WBC # BLD AUTO: 9.3 K/UL (ref 4–11)

## 2024-11-09 PROCEDURE — 2500000003 HC RX 250 WO HCPCS: Performed by: NURSE PRACTITIONER

## 2024-11-09 PROCEDURE — 6360000002 HC RX W HCPCS: Performed by: INTERNAL MEDICINE

## 2024-11-09 PROCEDURE — 85025 COMPLETE CBC W/AUTO DIFF WBC: CPT

## 2024-11-09 PROCEDURE — 36415 COLL VENOUS BLD VENIPUNCTURE: CPT

## 2024-11-09 PROCEDURE — 6360000002 HC RX W HCPCS: Performed by: NURSE PRACTITIONER

## 2024-11-09 PROCEDURE — 80053 COMPREHEN METABOLIC PANEL: CPT

## 2024-11-09 PROCEDURE — 2580000003 HC RX 258: Performed by: INTERNAL MEDICINE

## 2024-11-09 PROCEDURE — 1200000000 HC SEMI PRIVATE

## 2024-11-09 PROCEDURE — 6360000002 HC RX W HCPCS: Performed by: SURGERY

## 2024-11-09 PROCEDURE — 99024 POSTOP FOLLOW-UP VISIT: CPT | Performed by: SURGERY

## 2024-11-09 RX ORDER — METOPROLOL TARTRATE 1 MG/ML
2.5 INJECTION, SOLUTION INTRAVENOUS EVERY 6 HOURS
Status: DISCONTINUED | OUTPATIENT
Start: 2024-11-09 | End: 2024-11-10

## 2024-11-09 RX ADMIN — HYDROMORPHONE HYDROCHLORIDE 0.5 MG: 2 INJECTION, SOLUTION INTRAMUSCULAR; INTRAVENOUS; SUBCUTANEOUS at 00:08

## 2024-11-09 RX ADMIN — SODIUM CHLORIDE, PRESERVATIVE FREE 40 MG: 5 INJECTION INTRAVENOUS at 14:22

## 2024-11-09 RX ADMIN — ENOXAPARIN SODIUM 40 MG: 100 INJECTION SUBCUTANEOUS at 09:29

## 2024-11-09 RX ADMIN — HYDROMORPHONE HYDROCHLORIDE 0.5 MG: 2 INJECTION, SOLUTION INTRAMUSCULAR; INTRAVENOUS; SUBCUTANEOUS at 09:30

## 2024-11-09 RX ADMIN — Medication 10 ML: at 20:06

## 2024-11-09 RX ADMIN — POTASSIUM CHLORIDE, DEXTROSE MONOHYDRATE AND SODIUM CHLORIDE: 150; 5; 450 INJECTION, SOLUTION INTRAVENOUS at 09:22

## 2024-11-09 RX ADMIN — METOPROLOL TARTRATE 2.5 MG: 5 INJECTION INTRAVENOUS at 20:06

## 2024-11-09 RX ADMIN — METOPROLOL TARTRATE 2.5 MG: 5 INJECTION INTRAVENOUS at 14:22

## 2024-11-09 RX ADMIN — HYDROMORPHONE HYDROCHLORIDE 0.5 MG: 2 INJECTION, SOLUTION INTRAMUSCULAR; INTRAVENOUS; SUBCUTANEOUS at 21:39

## 2024-11-09 RX ADMIN — SODIUM CHLORIDE, PRESERVATIVE FREE 40 MG: 5 INJECTION INTRAVENOUS at 01:57

## 2024-11-09 ASSESSMENT — PAIN SCALES - GENERAL
PAINLEVEL_OUTOF10: 9
PAINLEVEL_OUTOF10: 0
PAINLEVEL_OUTOF10: 7
PAINLEVEL_OUTOF10: 7
PAINLEVEL_OUTOF10: 0
PAINLEVEL_OUTOF10: 0

## 2024-11-09 ASSESSMENT — PAIN DESCRIPTION - ORIENTATION
ORIENTATION: MID
ORIENTATION: MID

## 2024-11-09 ASSESSMENT — PAIN DESCRIPTION - DESCRIPTORS
DESCRIPTORS: DISCOMFORT;ACHING
DESCRIPTORS: ACHING;DISCOMFORT
DESCRIPTORS: ACHING;DISCOMFORT

## 2024-11-09 ASSESSMENT — PAIN DESCRIPTION - LOCATION
LOCATION: ABDOMEN
LOCATION: ABDOMEN
LOCATION: BACK;ABDOMEN

## 2024-11-09 ASSESSMENT — PAIN SCALES - WONG BAKER: WONGBAKER_NUMERICALRESPONSE: NO HURT

## 2024-11-09 NOTE — PROGRESS NOTES
[facility]        Progress Note    Patient Name: Kevin Blair  MRN: [id number]  YOB: 1935  Date of Evaluation: 2024    Subjective:  No acute events overnight  Pain controlled   No nausea or vomiting, NGT in place - bilious appearing  No flatus or BM  Resting in chair at this time    Post-Operative Day # 2      Vital Signs:  Patient Vitals for the past 24 hrs:   BP Temp Temp src Pulse Resp SpO2 Weight   24 0930 -- -- -- -- 16 -- --   24 0848 -- -- -- (!) 119 -- -- --   24 0800 128/71 98.4 °F (36.9 °C) Temporal (!) 111 19 95 % --   24 0600 122/72 -- -- (!) 117 14 95 % --   24 0400 (!) 143/91 98.6 °F (37 °C) Temporal (!) 115 16 96 % 54.9 kg (121 lb 0.5 oz)   24 0200 126/66 -- -- (!) 105 15 95 % --   24 0038 -- -- -- -- 16 -- --   24 0008 -- -- -- -- -- 96 % --   24 0000 116/65 97.7 °F (36.5 °C) Temporal (!) 104 16 96 % --   24 2200 118/61 -- -- 94 13 96 % --   24 2000 99/67 97.7 °F (36.5 °C) Temporal 100 17 95 % --   24 1800 108/62 -- -- 91 17 95 % --   24 1750 -- -- -- -- 12 -- --   24 1700 119/72 -- -- (!) 102 16 96 % --   24 1600 102/60 -- -- 87 12 94 % --   24 1500 117/60 -- -- 98 16 95 % --   24 1400 98/66 -- -- 80 11 93 % --   24 1300 (!) 104/58 -- -- 79 (!) 8 94 % --      TEMPERATURE HISTORY 24H: Temp (24hrs), Av.1 °F (36.7 °C), Min:97.7 °F (36.5 °C), Max:98.6 °F (37 °C)    BLOOD PRESSURE HISTORY: Systolic (36hrs), Av , Min:97 , Max:143    Diastolic (36hrs), Av, Min:54, Max:91      Intake/Output:  I/O last 3 completed shifts:  In: 3429.3 [I.V.:2968.7; IV Piggyback:460.6]  Out: 1195 [Urine:1095; Emesis/NG output:100]  I/O this shift:  In: 145.8 [I.V.:145.8]  Out: -   Drain/tube Output:       Physical Exam:  General: awake, drowsy, no acute distress  Lungs: unlabored respirations  Abdomen: soft, non-distended, mild incisional  IntraVENous 2 times per day    pantoprazole (PROTONIX) 40 mg in sodium chloride (PF) 0.9 % 10 mL injection  40 mg IntraVENous Q12H    enoxaparin  40 mg SubCUTAneous Daily     Continuous Infusions:   sodium chloride      dextrose 5% and 0.45% NaCl with KCl 20 mEq 50 mL/hr at 11/09/24 0922     PRN Meds:.sodium chloride flush, sodium chloride, melatonin, acetaminophen **OR** acetaminophen, HYDROmorphone **OR** HYDROmorphone, phenol, morphine, ondansetron      Assessment:  89 y.o. male admitted with   1. SBO (small bowel obstruction) (HCC)    2. Upper abdominal pain    3. 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 Pain controlled, mild incisional tenderness only on exam, wound bed is clean, no nausea or vomiting, NGT in place low but bilious  2. NPO with NGT decompression; monitor bowel function  3. IV hydration until PO intake is adequate; monitor and correct electrolytes  4. Activity as tolerated, ambulate TID--PT/OT following  5. Pulmonary toilet, incentive spirometry  6. PRN analgesics and antiemetics--minimizing narcotics as tolerated  7. DVT prophylaxis with SCD's  8. Management of medical comorbid etiologies per primary team and consulting services    EDUCATION:  Educated patient on plan of care and disease process--all questions answered.    Plans discussed with patient and nursing.      Signed:  Chapo Aquino MD

## 2024-11-09 NOTE — PROGRESS NOTES
[facility]    11/9/2024 8:42 AM        Name: Kevin Blair .              Admitted: 11/7/2024  Primary Care Provider: Blane Callhaan MD (Tel: [phone])      Subjective:  .    Pt seen this am while resting in bed.  Was previously up in the chair  Belly is quiet today.  No flatus  Friend at the bedside           POD # 2 from emergent exploratory lap with lysis of adhesions for small bowel obstruction.     Reviewed interval ancillary notes    Current Medications  sodium chloride flush 0.9 % injection 10 mL, 2 times per day  sodium chloride flush 0.9 % injection 10 mL, PRN  0.9 % sodium chloride infusion, PRN  melatonin tablet 3 mg, Nightly PRN  acetaminophen (TYLENOL) tablet 650 mg, Q6H PRN   Or  acetaminophen (TYLENOL) suppository 650 mg, Q6H PRN  pantoprazole (PROTONIX) 40 mg in sodium chloride (PF) 0.9 % 10 mL injection, Q12H  HYDROmorphone (DILAUDID) injection 0.25 mg, Q3H PRN   Or  HYDROmorphone (DILAUDID) injection 0.5 mg, Q3H PRN  dextrose 5 % and 0.45 % NaCl with KCl 20 mEq infusion, Continuous  enoxaparin (LOVENOX) injection 40 mg, Daily  phenol 1.4 % mouth spray 1 spray, Q2H PRN  morphine injection 4 mg, Q30 Min PRN  ondansetron (ZOFRAN) injection 4 mg, Q6H PRN        Objective:  /72   Pulse (!) 117   Temp 98.6 °F (37 °C) (Temporal)   Resp 14   Ht 1.753 m (5' 9\")   Wt 54.9 kg (121 lb 0.5 oz)   SpO2 95%   BMI 17.87 kg/m²     Intake/Output Summary (Last 24 hours) at 11/9/2024 0842  Last data filed at 11/9/2024 0600  Gross per 24 hour   Intake 1633.76 ml   Output 545 ml   Net 1088.76 ml      Wt Readings from Last 3 Encounters:   11/09/24 54.9 kg (121 lb 0.5 oz)   06/21/12 83.9 kg (185 lb)   05/02/12 83.9 kg (185 lb)       General appearance:  Appears comfortable, alert and pleasant.  Looks younger than stated age   Eyes: Sclera clear. Pupils equal.  ENT: Moist oral mucosa. Trachea midline, no adenopathy.  Cardiovascular: Regular rhythm, normal S1, S2.

## 2024-11-10 LAB
ALBUMIN SERPL-MCNC: 3 G/DL (ref 3.4–5)
ALBUMIN/GLOB SERPL: 0.9 {RATIO} (ref 1.1–2.2)
ALP SERPL-CCNC: 56 U/L (ref 40–129)
ALT SERPL-CCNC: 14 U/L (ref 10–40)
ANION GAP SERPL CALCULATED.3IONS-SCNC: 8 MMOL/L (ref 3–16)
AST SERPL-CCNC: 33 U/L (ref 15–37)
BASOPHILS # BLD: 0 K/UL (ref 0–0.2)
BASOPHILS NFR BLD: 0.2 %
BILIRUB SERPL-MCNC: 0.8 MG/DL (ref 0–1)
BUN SERPL-MCNC: 17 MG/DL (ref 7–20)
CALCIUM SERPL-MCNC: 10.4 MG/DL (ref 8.3–10.6)
CHLORIDE SERPL-SCNC: 100 MMOL/L (ref 99–110)
CO2 SERPL-SCNC: 27 MMOL/L (ref 21–32)
CREAT SERPL-MCNC: 0.9 MG/DL (ref 0.8–1.3)
DEPRECATED RDW RBC AUTO: 12.6 % (ref 12.4–15.4)
EOSINOPHIL # BLD: 0 K/UL (ref 0–0.6)
EOSINOPHIL NFR BLD: 0 %
GFR SERPLBLD CREATININE-BSD FMLA CKD-EPI: 82 ML/MIN/{1.73_M2}
GLUCOSE SERPL-MCNC: 111 MG/DL (ref 70–99)
HCT VFR BLD AUTO: 33.4 % (ref 40.5–52.5)
HGB BLD-MCNC: 11.7 G/DL (ref 13.5–17.5)
LYMPHOCYTES # BLD: 0.2 K/UL (ref 1–5.1)
LYMPHOCYTES NFR BLD: 2.2 %
MAGNESIUM SERPL-MCNC: 2.04 MG/DL (ref 1.8–2.4)
MCH RBC QN AUTO: 33.1 PG (ref 26–34)
MCHC RBC AUTO-ENTMCNC: 34.9 G/DL (ref 31–36)
MCV RBC AUTO: 94.7 FL (ref 80–100)
MONOCYTES # BLD: 0.3 K/UL (ref 0–1.3)
MONOCYTES NFR BLD: 3.4 %
NEUTROPHILS # BLD: 7.3 K/UL (ref 1.7–7.7)
NEUTROPHILS NFR BLD: 94.2 %
PLATELET # BLD AUTO: 153 K/UL (ref 135–450)
PMV BLD AUTO: 8.7 FL (ref 5–10.5)
POTASSIUM SERPL-SCNC: 3.5 MMOL/L (ref 3.5–5.1)
PROT SERPL-MCNC: 6.5 G/DL (ref 6.4–8.2)
RBC # BLD AUTO: 3.53 M/UL (ref 4.2–5.9)
SODIUM SERPL-SCNC: 135 MMOL/L (ref 136–145)
WBC # BLD AUTO: 7.8 K/UL (ref 4–11)

## 2024-11-10 PROCEDURE — 1200000000 HC SEMI PRIVATE

## 2024-11-10 PROCEDURE — 94760 N-INVAS EAR/PLS OXIMETRY 1: CPT

## 2024-11-10 PROCEDURE — 36415 COLL VENOUS BLD VENIPUNCTURE: CPT

## 2024-11-10 PROCEDURE — 80053 COMPREHEN METABOLIC PANEL: CPT

## 2024-11-10 PROCEDURE — 6360000002 HC RX W HCPCS: Performed by: SURGERY

## 2024-11-10 PROCEDURE — 2500000003 HC RX 250 WO HCPCS: Performed by: NURSE PRACTITIONER

## 2024-11-10 PROCEDURE — 2580000003 HC RX 258: Performed by: INTERNAL MEDICINE

## 2024-11-10 PROCEDURE — 83735 ASSAY OF MAGNESIUM: CPT

## 2024-11-10 PROCEDURE — 99024 POSTOP FOLLOW-UP VISIT: CPT | Performed by: SURGERY

## 2024-11-10 PROCEDURE — 6360000002 HC RX W HCPCS: Performed by: INTERNAL MEDICINE

## 2024-11-10 PROCEDURE — 6360000002 HC RX W HCPCS: Performed by: NURSE PRACTITIONER

## 2024-11-10 PROCEDURE — 85025 COMPLETE CBC W/AUTO DIFF WBC: CPT

## 2024-11-10 RX ORDER — METOPROLOL TARTRATE 1 MG/ML
5 INJECTION, SOLUTION INTRAVENOUS EVERY 6 HOURS
Status: DISCONTINUED | OUTPATIENT
Start: 2024-11-10 | End: 2024-11-12

## 2024-11-10 RX ADMIN — METOPROLOL TARTRATE 5 MG: 5 INJECTION INTRAVENOUS at 09:43

## 2024-11-10 RX ADMIN — ENOXAPARIN SODIUM 40 MG: 100 INJECTION SUBCUTANEOUS at 09:44

## 2024-11-10 RX ADMIN — METOPROLOL TARTRATE 2.5 MG: 5 INJECTION INTRAVENOUS at 01:28

## 2024-11-10 RX ADMIN — Medication 10 ML: at 09:45

## 2024-11-10 RX ADMIN — HYDROMORPHONE HYDROCHLORIDE 0.5 MG: 2 INJECTION, SOLUTION INTRAMUSCULAR; INTRAVENOUS; SUBCUTANEOUS at 12:55

## 2024-11-10 RX ADMIN — SODIUM CHLORIDE, PRESERVATIVE FREE 40 MG: 5 INJECTION INTRAVENOUS at 01:28

## 2024-11-10 RX ADMIN — POTASSIUM CHLORIDE, DEXTROSE MONOHYDRATE AND SODIUM CHLORIDE: 150; 5; 450 INJECTION, SOLUTION INTRAVENOUS at 01:27

## 2024-11-10 RX ADMIN — Medication 10 ML: at 21:45

## 2024-11-10 RX ADMIN — HYDROMORPHONE HYDROCHLORIDE 0.5 MG: 2 INJECTION, SOLUTION INTRAMUSCULAR; INTRAVENOUS; SUBCUTANEOUS at 09:44

## 2024-11-10 RX ADMIN — POTASSIUM CHLORIDE, DEXTROSE MONOHYDRATE AND SODIUM CHLORIDE: 150; 5; 450 INJECTION, SOLUTION INTRAVENOUS at 21:44

## 2024-11-10 RX ADMIN — SODIUM CHLORIDE, PRESERVATIVE FREE 40 MG: 5 INJECTION INTRAVENOUS at 16:19

## 2024-11-10 RX ADMIN — METOPROLOL TARTRATE 5 MG: 5 INJECTION INTRAVENOUS at 21:45

## 2024-11-10 RX ADMIN — METOPROLOL TARTRATE 5 MG: 5 INJECTION INTRAVENOUS at 16:19

## 2024-11-10 ASSESSMENT — PAIN SCALES - GENERAL
PAINLEVEL_OUTOF10: 0
PAINLEVEL_OUTOF10: 7
PAINLEVEL_OUTOF10: 7
PAINLEVEL_OUTOF10: 0

## 2024-11-10 ASSESSMENT — PAIN DESCRIPTION - LOCATION
LOCATION: ABDOMEN
LOCATION: ABDOMEN

## 2024-11-10 ASSESSMENT — PAIN DESCRIPTION - DESCRIPTORS: DESCRIPTORS: DISCOMFORT

## 2024-11-10 ASSESSMENT — PAIN DESCRIPTION - ORIENTATION
ORIENTATION: MID
ORIENTATION: MID

## 2024-11-10 NOTE — PROGRESS NOTES
mg IntraVENous Q12H    enoxaparin  40 mg SubCUTAneous Daily     Continuous Infusions:   sodium chloride      dextrose 5% and 0.45% NaCl with KCl 20 mEq 50 mL/hr at 11/10/24 0600     PRN Meds:.sodium chloride flush, sodium chloride, melatonin, acetaminophen **OR** acetaminophen, HYDROmorphone **OR** HYDROmorphone, phenol, morphine, ondansetron      Assessment:  89 y.o. male admitted with   1. SBO (small bowel obstruction) (HCC)    2. Upper abdominal pain    3. 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 Pain controlled, mild incisional tenderness only on exam, wound bed is worse today - will change to [facility]in[facility]  2. NPO with NGT decompression; monitor bowel function - not ready to remove  3. IV hydration until PO intake is adequate; monitor and correct electrolytes  4. Activity as tolerated, ambulate TID--PT/OT following  5. Pulmonary toilet, incentive spirometry  6. PRN analgesics and antiemetics--minimizing narcotics as tolerated  7. DVT prophylaxis with SCD's  8. Management of medical comorbid etiologies per primary team and consulting services    EDUCATION:  Educated patient on plan of care and disease process--all questions answered.    Plans discussed with patient and nursing.      Signed:    Chapo Aquino MD

## 2024-11-10 NOTE — CARE COORDINATION
Discharge Planning Note:     Attempted to speak with patient to assess and identify potential discharge needs. Patient is off unit or unavailable at this time. CM will re-attempt at later time.     Pt attempted to call pt's daughter, Anjali([phone]) but there was no answer.         Electronically signed by RUY Alcantar on 11/10/2024 at 3:45 PM

## 2024-11-10 NOTE — PLAN OF CARE
Problem: Discharge Planning  Goal: Discharge to home or other facility with appropriate resources  Outcome: Progressing  Flowsheets (Taken 11/10/2024 0800)  Discharge to home or other facility with appropriate resources:   Identify barriers to discharge with patient and caregiver   Arrange for needed discharge resources and transportation as appropriate   Identify discharge learning needs (meds, wound care, etc)   Refer to discharge planning if patient needs post-hospital services based on physician order or complex needs related to functional status, cognitive ability or social support system     Problem: Pain  Goal: Verbalizes/displays adequate comfort level or baseline comfort level  Outcome: Progressing  Flowsheets (Taken 11/10/2024 0800)  Verbalizes/displays adequate comfort level or baseline comfort level:   Encourage patient to monitor pain and request assistance   Assess pain using appropriate pain scale   Administer analgesics based on type and severity of pain and evaluate response   Implement non-pharmacological measures as appropriate and evaluate response   Consider cultural and social influences on pain and pain management   Notify Licensed Independent Practitioner if interventions unsuccessful or patient reports new pain     Problem: Safety - Adult  Goal: Free from fall injury  Outcome: Progressing  Flowsheets (Taken 11/10/2024 1714)  Free From Fall Injury: Instruct family/caregiver on patient safety     Problem: Skin/Tissue Integrity  Goal: Absence of new skin breakdown  Description: 1.  Monitor for areas of redness and/or skin breakdown  2.  Assess vascular access sites hourly  3.  Every 4-6 hours minimum:  Change oxygen saturation probe site  4.  Every 4-6 hours:  If on nasal continuous positive airway pressure, respiratory therapy assess nares and determine need for appliance change or resting period.  Outcome: Progressing     Problem: ABCDS Injury Assessment  Goal: Absence of physical  injury  Outcome: Progressing     Problem: Metabolic/Fluid and Electrolytes - Adult  Goal: Electrolytes maintained within normal limits  Outcome: Progressing  Flowsheets (Taken 11/10/2024 1600)  Electrolytes maintained within normal limits:   Monitor labs and assess patient for signs and symptoms of electrolyte imbalances   Administer electrolyte replacement as ordered   Monitor response to electrolyte replacements, including repeat lab results as appropriate  Goal: Hemodynamic stability and optimal renal function maintained  Outcome: Progressing  Flowsheets (Taken 11/10/2024 1600)  Hemodynamic stability and optimal renal function maintained:   Monitor labs and assess for signs and symptoms of volume excess or deficit   Monitor intake, output and patient weight   Monitor urine specific gravity, serum osmolarity and serum sodium as indicated or ordered   Monitor response to interventions for patient's volume status, including labs, urine output, blood pressure (other measures as available)  Goal: Glucose maintained within prescribed range  Outcome: Progressing  Flowsheets (Taken 11/10/2024 1600)  Glucose maintained within prescribed range:   Monitor blood glucose as ordered   Assess for signs and symptoms of hyperglycemia and hypoglycemia   Administer ordered medications to maintain glucose within target range   Assess barriers to adequate nutritional intake and initiate nutrition consult as needed

## 2024-11-10 NOTE — OP NOTE
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    PROCEDURES:  Exploratory laparotomy with lysis of adhesions (30 minutes), small-bowel resection with anastomosis.    ANESTHESIA:  General endotracheal.    ESTIMATED BLOOD LOSS:  Minimal.    COMPLICATIONS:  None.    SPECIMEN:  Small bowel segment.    OPERATIVE INDICATIONS AND CONSENT:  The patient is an 89-year-old male who presented to the ED with a 1-day history of severe abdominal pain.  CAT scan of the abdomen and pelvis showed findings consistent with a small bowel obstruction.  There was swelling of the mesentery concerning for the midgut volvulus or internal hernia.  He was brought emergently to the operating room for exploration.  He and his family were explained the risks, benefits, and possible complications.    PROCEDURE IN DETAIL:  The patient was brought to operative suite, placed in supine position on the operative table.  After general endotracheal anesthesia, he was prepped and draped in usual sterile fashion.    We made a midline incision from the mid epigastrium to below the umbilicus.  Dissection was carried through the midline to the peritoneal cavity was entered.  The patient did have omental adhesions circumferentially to the abdominal wall.  These were taken down with cautery.  He also had some bowel adhesions, which did not allow us to eviscerate the bowel.  These were taken down with cautery.

## 2024-11-10 NOTE — PROGRESS NOTES
[facility]    11/10/2024 8:01 AM        Name: Kevin Blair .              Admitted: 11/7/2024  Primary Care Provider: Blane Callahan MD (Tel: [phone])      Subjective:  .    Pt seen this am while resting in bed.  Was up walking in the room on Saturday , several times.  No flatus or bowel sounds today     POD # 3  from emergent exploratory lap with lysis of adhesions for small bowel obstruction.     Reviewed interval ancillary notes    Current Medications  metoprolol (LOPRESSOR) injection 2.5 mg, Q6H  sodium chloride flush 0.9 % injection 10 mL, 2 times per day  sodium chloride flush 0.9 % injection 10 mL, PRN  0.9 % sodium chloride infusion, PRN  melatonin tablet 3 mg, Nightly PRN  acetaminophen (TYLENOL) tablet 650 mg, Q6H PRN   Or  acetaminophen (TYLENOL) suppository 650 mg, Q6H PRN  pantoprazole (PROTONIX) 40 mg in sodium chloride (PF) 0.9 % 10 mL injection, Q12H  HYDROmorphone (DILAUDID) injection 0.25 mg, Q3H PRN   Or  HYDROmorphone (DILAUDID) injection 0.5 mg, Q3H PRN  dextrose 5 % and 0.45 % NaCl with KCl 20 mEq infusion, Continuous  enoxaparin (LOVENOX) injection 40 mg, Daily  phenol 1.4 % mouth spray 1 spray, Q2H PRN  morphine injection 4 mg, Q30 Min PRN  ondansetron (ZOFRAN) injection 4 mg, Q6H PRN        Objective:  BP (!) 142/74   Pulse (!) 107   Temp 98.4 °F (36.9 °C) (Temporal)   Resp 17   Ht 1.753 m (5' 9\")   Wt 52.8 kg (116 lb 6.5 oz)   SpO2 92%   BMI 17.19 kg/m²     Intake/Output Summary (Last 24 hours) at 11/10/2024 0801  Last data filed at 11/10/2024 0600  Gross per 24 hour   Intake 1173.01 ml   Output 1150 ml   Net 23.01 ml      Wt Readings from Last 3 Encounters:   11/10/24 52.8 kg (116 lb 6.5 oz)   06/21/12 83.9 kg (185 lb)   05/02/12 83.9 kg (185 lb)       General appearance:  Appears comfortable, alert and pleasant.  Looks younger than stated age   Eyes: Sclera clear. Pupils equal.  ENT: Moist oral mucosa. Trachea midline, no

## 2024-11-11 LAB
ALBUMIN SERPL-MCNC: 3.1 G/DL (ref 3.4–5)
ANION GAP SERPL CALCULATED.3IONS-SCNC: 9 MMOL/L (ref 3–16)
BASOPHILS # BLD: 0 K/UL (ref 0–0.2)
BASOPHILS NFR BLD: 0.1 %
BUN SERPL-MCNC: 19 MG/DL (ref 7–20)
CALCIUM SERPL-MCNC: 10.5 MG/DL (ref 8.3–10.6)
CHLORIDE SERPL-SCNC: 102 MMOL/L (ref 99–110)
CO2 SERPL-SCNC: 30 MMOL/L (ref 21–32)
CREAT SERPL-MCNC: 0.8 MG/DL (ref 0.8–1.3)
DEPRECATED RDW RBC AUTO: 12.8 % (ref 12.4–15.4)
EOSINOPHIL # BLD: 0 K/UL (ref 0–0.6)
EOSINOPHIL NFR BLD: 0.2 %
GFR SERPLBLD CREATININE-BSD FMLA CKD-EPI: 85 ML/MIN/{1.73_M2}
GLUCOSE BLD-MCNC: 124 MG/DL (ref 70–99)
GLUCOSE SERPL-MCNC: 122 MG/DL (ref 70–99)
HCT VFR BLD AUTO: 34.1 % (ref 40.5–52.5)
HGB BLD-MCNC: 11.5 G/DL (ref 13.5–17.5)
LYMPHOCYTES # BLD: 0.2 K/UL (ref 1–5.1)
LYMPHOCYTES NFR BLD: 2.9 %
MAGNESIUM SERPL-MCNC: 2.09 MG/DL (ref 1.8–2.4)
MCH RBC QN AUTO: 31.9 PG (ref 26–34)
MCHC RBC AUTO-ENTMCNC: 33.7 G/DL (ref 31–36)
MCV RBC AUTO: 94.8 FL (ref 80–100)
MONOCYTES # BLD: 0.3 K/UL (ref 0–1.3)
MONOCYTES NFR BLD: 4.5 %
NEUTROPHILS # BLD: 6 K/UL (ref 1.7–7.7)
NEUTROPHILS NFR BLD: 92.3 %
PERFORMED ON: ABNORMAL
PHOSPHATE SERPL-MCNC: 1.9 MG/DL (ref 2.5–4.9)
PLATELET # BLD AUTO: 183 K/UL (ref 135–450)
PMV BLD AUTO: 8.8 FL (ref 5–10.5)
POTASSIUM SERPL-SCNC: 3 MMOL/L (ref 3.5–5.1)
RBC # BLD AUTO: 3.6 M/UL (ref 4.2–5.9)
SODIUM SERPL-SCNC: 141 MMOL/L (ref 136–145)
WBC # BLD AUTO: 6.5 K/UL (ref 4–11)

## 2024-11-11 PROCEDURE — 85025 COMPLETE CBC W/AUTO DIFF WBC: CPT

## 2024-11-11 PROCEDURE — 83735 ASSAY OF MAGNESIUM: CPT

## 2024-11-11 PROCEDURE — 2500000003 HC RX 250 WO HCPCS: Performed by: NURSE PRACTITIONER

## 2024-11-11 PROCEDURE — APPNB30 APP NON BILLABLE TIME 0-30 MINS: Performed by: NURSE PRACTITIONER

## 2024-11-11 PROCEDURE — 97110 THERAPEUTIC EXERCISES: CPT

## 2024-11-11 PROCEDURE — APPSS15 APP SPLIT SHARED TIME 0-15 MINUTES: Performed by: NURSE PRACTITIONER

## 2024-11-11 PROCEDURE — 80069 RENAL FUNCTION PANEL: CPT

## 2024-11-11 PROCEDURE — 6360000002 HC RX W HCPCS: Performed by: NURSE PRACTITIONER

## 2024-11-11 PROCEDURE — 97530 THERAPEUTIC ACTIVITIES: CPT

## 2024-11-11 PROCEDURE — 36415 COLL VENOUS BLD VENIPUNCTURE: CPT

## 2024-11-11 PROCEDURE — 97116 GAIT TRAINING THERAPY: CPT

## 2024-11-11 PROCEDURE — 6370000000 HC RX 637 (ALT 250 FOR IP): Performed by: NURSE PRACTITIONER

## 2024-11-11 PROCEDURE — 2580000003 HC RX 258: Performed by: INTERNAL MEDICINE

## 2024-11-11 PROCEDURE — 2580000003 HC RX 258: Performed by: NURSE PRACTITIONER

## 2024-11-11 PROCEDURE — 6360000002 HC RX W HCPCS: Performed by: INTERNAL MEDICINE

## 2024-11-11 PROCEDURE — 1200000000 HC SEMI PRIVATE

## 2024-11-11 RX ORDER — BISACODYL 10 MG
10 SUPPOSITORY, RECTAL RECTAL EVERY 6 HOURS
Status: DISPENSED | OUTPATIENT
Start: 2024-11-11 | End: 2024-11-12

## 2024-11-11 RX ADMIN — METOPROLOL TARTRATE 5 MG: 5 INJECTION INTRAVENOUS at 03:30

## 2024-11-11 RX ADMIN — SODIUM CHLORIDE, PRESERVATIVE FREE 40 MG: 5 INJECTION INTRAVENOUS at 12:04

## 2024-11-11 RX ADMIN — POTASSIUM CHLORIDE, DEXTROSE MONOHYDRATE AND SODIUM CHLORIDE: 150; 5; 450 INJECTION, SOLUTION INTRAVENOUS at 16:12

## 2024-11-11 RX ADMIN — POTASSIUM PHOSPHATE, MONOBASIC POTASSIUM PHOSPHATE, DIBASIC 20 MMOL: 224; 236 INJECTION, SOLUTION, CONCENTRATE INTRAVENOUS at 11:59

## 2024-11-11 RX ADMIN — METOPROLOL TARTRATE 5 MG: 5 INJECTION INTRAVENOUS at 20:51

## 2024-11-11 RX ADMIN — BISACODYL 10 MG: 10 SUPPOSITORY RECTAL at 15:00

## 2024-11-11 RX ADMIN — SODIUM CHLORIDE, PRESERVATIVE FREE 40 MG: 5 INJECTION INTRAVENOUS at 03:30

## 2024-11-11 RX ADMIN — Medication 10 ML: at 20:52

## 2024-11-11 RX ADMIN — METOPROLOL TARTRATE 5 MG: 5 INJECTION INTRAVENOUS at 09:00

## 2024-11-11 RX ADMIN — ENOXAPARIN SODIUM 40 MG: 100 INJECTION SUBCUTANEOUS at 09:00

## 2024-11-11 RX ADMIN — METOPROLOL TARTRATE 5 MG: 5 INJECTION INTRAVENOUS at 16:14

## 2024-11-11 ASSESSMENT — PAIN SCALES - GENERAL: PAINLEVEL_OUTOF10: 0

## 2024-11-11 NOTE — PROGRESS NOTES
[facility]        Progress Note    Patient Name: Kevin Blair  MRN: [id number]  YOB: 1935  Date of Evaluation: 2024    Subjective:  No acute events overnight  Pain controlled   No nausea or vomiting, NGT in place  No flatus or BM  Up to chair at this time    Post-Operative Day #4      Vital Signs:  Patient Vitals for the past 24 hrs:   BP Temp Temp src Pulse Resp SpO2   24 0900 123/73 98.4 °F (36.9 °C) Oral (!) 101 16 95 %   24 0416 -- -- -- -- -- 96 %   24 0327 (!) 146/79 98.5 °F (36.9 °C) Oral (!) 106 17 --   24 0003 137/71 97.9 °F (36.6 °C) Oral 88 -- 91 %   11/10/24 2300 -- -- -- 90 17 --   11/10/24 2200 137/72 -- -- -- -- --   11/10/24 2130 130/67 98.4 °F (36.9 °C) Temporal 98 16 96 %   11/10/24 1800 (!) 147/70 -- -- 88 15 --   11/10/24 1600 129/72 98.8 °F (37.1 °C) Temporal 92 18 94 %   11/10/24 1400 (!) 116/58 -- -- 86 -- --   11/10/24 1325 -- -- -- -- 18 --      TEMPERATURE HISTORY 24H: Temp (24hrs), Av.4 °F (36.9 °C), Min:97.9 °F (36.6 °C), Max:98.8 °F (37.1 °C)    BLOOD PRESSURE HISTORY: Systolic (36hrs), Av , Min:116 , Max:147    Diastolic (36hrs), Av, Min:58, Max:79      Intake/Output:  I/O last 3 completed shifts:  In: 1618.9 [I.V.:1588.9; NG/GT:30]  Out: 1400 [Urine:1000; Emesis/NG output:400]  No intake/output data recorded.  Drain/tube Output:       Physical Exam:  General: awake, alert, no acute distress  Lungs: unlabored respirations  Abdomen: soft, non-distended, incisional tenderness only, bowel sounds present  Skin/Wound: open midline abdominal wound bed is generally clean, only scant scattered fibrinous debris, no drainage, fascia is intact    Labs:  CBC:    Recent Labs     11/09/24  0449 11/10/24  0430 24  0954   WBC 9.3 7.8 6.5   HGB 12.7* 11.7* 11.5*   HCT 37.3* 33.4* 34.1*    153 183     BMP:    Recent Labs     11/09/24  0449 11/10/24  0430 24  0954    135* 141    K 3.7 3.5 3.0*    100 102   CO2 26 27 30   BUN 21* 17 19   CREATININE 1.0 0.9 0.8   GLUCOSE 123* 111* 122*     Hepatic:    Recent Labs     11/09/24  0449 11/10/24  0430   AST 27 33   ALT 12 14   BILITOT 0.4 0.8   ALKPHOS 50 56     Amylase:  No results found for: \"AMYLASE\"  Lipase:    Lab Results   Component Value Date/Time    LIPASE 12.0 11/07/2024 07:53 PM      Mag:    Lab Results   Component Value Date/Time    MG 2.09 11/11/2024 09:54 AM    MG 2.04 11/10/2024 04:30 AM     Phos:     Lab Results   Component Value Date/Time    PHOS 1.9 11/11/2024 09:54 AM    PHOS 2.7 05/24/2024 02:14 PM      Coags:   Lab Results   Component Value Date/Time    PROTIME 13.9 07/11/2012 08:50 AM    INR 1.1 07/11/2012 08:50 AM    APTT 31.9 06/21/2012 01:15 PM       Cultures:  Anaerobic culture  No results found for: \"LABANAE\"  Fungus stain  No results found for requested labs within last 30 days.     Gram stain  No results found for requested labs within last 30 days.     Organism  No results found for: \"ORG\"  Surgical culture  No results found for: \"CXSURG\"  Blood culture 1  No results found for requested labs within last 30 days.     Blood culture 2  No results found for requested labs within last 30 days.     Fecal occult  No results found for requested labs within last 30 days.     GI bacterial pathogens by PCR  No results found for requested labs within last 30 days.     C. difficile  No results found for requested labs within last 30 days.     Urine culture  No results found for: \"LABURIN\"    Pathology:  OR 11/7/2024--FINAL DIAGNOSIS:     Small intestine, resection;      - Small intestine with marked transmural congestion and hemorrhage, compatible with volvulus/ ischemic type of injury.     Imaging:  I have personally reviewed the following films:    No results found.    Scheduled Meds:   potassium phosphate IVPB (PERIPHERAL LINE)  20 mmol IntraVENous Once    bisacodyl  10 mg Rectal Q6H    metoprolol  5 mg IntraVENous Q6H

## 2024-11-11 NOTE — PROGRESS NOTES
Pt awake in chair working with therapy. NG remains in place. Voided per bathroom. States he is independent at home. Pt is axox4 and able to answer questions and follow commands throughout assessment. Up to walk in halls with therapy. Call light in reach.

## 2024-11-11 NOTE — PLAN OF CARE
Problem: Discharge Planning  Goal: Discharge to home or other facility with appropriate resources  11/11/2024 0151 by Keith Nguyen RN  Outcome: Progressing  11/10/2024 1714 by Susan Gallegos RN  Outcome: Progressing  Flowsheets (Taken 11/10/2024 0800)  Discharge to home or other facility with appropriate resources:   Identify barriers to discharge with patient and caregiver   Arrange for needed discharge resources and transportation as appropriate   Identify discharge learning needs (meds, wound care, etc)   Refer to discharge planning if patient needs post-hospital services based on physician order or complex needs related to functional status, cognitive ability or social support system     Problem: Pain  Goal: Verbalizes/displays adequate comfort level or baseline comfort level  11/11/2024 0151 by Keith Nguyen RN  Outcome: Progressing  Flowsheets (Taken 11/10/2024 2130 by Fawad Cornelius)  Verbalizes/displays adequate comfort level or baseline comfort level:   Encourage patient to monitor pain and request assistance   Assess pain using appropriate pain scale   Administer analgesics based on type and severity of pain and evaluate response   Implement non-pharmacological measures as appropriate and evaluate response   Consider cultural and social influences on pain and pain management   Notify Licensed Independent Practitioner if interventions unsuccessful or patient reports new pain  11/10/2024 1714 by Susan Gallegos RN  Outcome: Progressing  Flowsheets (Taken 11/10/2024 0800)  Verbalizes/displays adequate comfort level or baseline comfort level:   Encourage patient to monitor pain and request assistance   Assess pain using appropriate pain scale   Administer analgesics based on type and severity of pain and evaluate response   Implement non-pharmacological measures as appropriate and evaluate response   Consider cultural and social influences on pain and pain management   Notify Licensed

## 2024-11-11 NOTE — PROGRESS NOTES
[facility] - Inpatient Rehabilitation Department   Phone: [phone]    Physical Therapy    [] Initial Evaluation            [x] Daily Treatment Note         [] Discharge Summary      Patient: Kevin Blair   : 1935   MRN: [id number]   Date of Service:  2024  Admitting Diagnosis: SBO (small bowel obstruction) (HCC)  Current Admission Summary: 89 y.o. male who presented to [facility] with past medical history hypertension, hyperlipidemia, arthritis, presented ED with chief complaint of abdominal pain. Pt reported symptoms started suddenly around 12 PM on  reported that he drank milk and then started having some abdominal pain epigastric right upper quadrant progressively worsening, would go up to 10/10 with cramping and distention. Patient also admits to having nausea and vomiting reported that the vomit was nonbloody or coffee-ground withno fever or diarrhea chest pain shortness of breath or dysuria      SBO s/p ex lap, lysis of adhesions, small bowel resection    Past Medical History:  has a past medical history of Arthritis, Diverticulitis, Hyperlipidemia, Hypertension, and S/P colon resection.  Past Surgical History:  has a past surgical history that includes TURP; colectomy; Tonsillectomy; Cataract removal; shoulder surgery; Knee arthroscopy; Carpal tunnel release; Total hip arthroplasty (2012); and Small intestine surgery (N/A, 2024).  Discharge Recommendations: Kevin Blair scored a 18/24 on the AM-PAC short mobility form. Current research shows that an AM-PAC score of 18 or greater is typically associated with a discharge to the patient's home setting. Based on the patient's AM-PAC score and their current functional mobility deficits, it is recommended that the patient have 2-3 sessions per week of Physical Therapy at d/c to increase the patient's independence.  At this time, this patient demonstrates the endurance and safety to discharge home with

## 2024-11-12 LAB
ALBUMIN SERPL-MCNC: 2.7 G/DL (ref 3.4–5)
ANION GAP SERPL CALCULATED.3IONS-SCNC: 9 MMOL/L (ref 3–16)
BASOPHILS # BLD: 0 K/UL (ref 0–0.2)
BASOPHILS NFR BLD: 0.1 %
BUN SERPL-MCNC: 22 MG/DL (ref 7–20)
CALCIUM SERPL-MCNC: 9.9 MG/DL (ref 8.3–10.6)
CHLORIDE SERPL-SCNC: 107 MMOL/L (ref 99–110)
CO2 SERPL-SCNC: 29 MMOL/L (ref 21–32)
CREAT SERPL-MCNC: 0.8 MG/DL (ref 0.8–1.3)
DEPRECATED RDW RBC AUTO: 13 % (ref 12.4–15.4)
EOSINOPHIL # BLD: 0 K/UL (ref 0–0.6)
EOSINOPHIL NFR BLD: 0.6 %
GFR SERPLBLD CREATININE-BSD FMLA CKD-EPI: 85 ML/MIN/{1.73_M2}
GLUCOSE SERPL-MCNC: 121 MG/DL (ref 70–99)
HCT VFR BLD AUTO: 30.1 % (ref 40.5–52.5)
HGB BLD-MCNC: 10.1 G/DL (ref 13.5–17.5)
LYMPHOCYTES # BLD: 0.2 K/UL (ref 1–5.1)
LYMPHOCYTES NFR BLD: 3.2 %
MAGNESIUM SERPL-MCNC: 2.28 MG/DL (ref 1.8–2.4)
MCH RBC QN AUTO: 31.9 PG (ref 26–34)
MCHC RBC AUTO-ENTMCNC: 33.7 G/DL (ref 31–36)
MCV RBC AUTO: 94.6 FL (ref 80–100)
MONOCYTES # BLD: 0.5 K/UL (ref 0–1.3)
MONOCYTES NFR BLD: 8.7 %
NEUTROPHILS # BLD: 5.2 K/UL (ref 1.7–7.7)
NEUTROPHILS NFR BLD: 87.4 %
PHOSPHATE SERPL-MCNC: 2 MG/DL (ref 2.5–4.9)
PLATELET # BLD AUTO: 160 K/UL (ref 135–450)
PMV BLD AUTO: 8.6 FL (ref 5–10.5)
POTASSIUM SERPL-SCNC: 2.6 MMOL/L (ref 3.5–5.1)
POTASSIUM SERPL-SCNC: 3.5 MMOL/L (ref 3.5–5.1)
RBC # BLD AUTO: 3.18 M/UL (ref 4.2–5.9)
SODIUM SERPL-SCNC: 145 MMOL/L (ref 136–145)
WBC # BLD AUTO: 5.9 K/UL (ref 4–11)

## 2024-11-12 PROCEDURE — 6360000002 HC RX W HCPCS: Performed by: NURSE PRACTITIONER

## 2024-11-12 PROCEDURE — 92526 ORAL FUNCTION THERAPY: CPT

## 2024-11-12 PROCEDURE — 94761 N-INVAS EAR/PLS OXIMETRY MLT: CPT

## 2024-11-12 PROCEDURE — 2500000003 HC RX 250 WO HCPCS: Performed by: INTERNAL MEDICINE

## 2024-11-12 PROCEDURE — 2500000003 HC RX 250 WO HCPCS: Performed by: NURSE PRACTITIONER

## 2024-11-12 PROCEDURE — 2580000003 HC RX 258: Performed by: NURSE PRACTITIONER

## 2024-11-12 PROCEDURE — 85025 COMPLETE CBC W/AUTO DIFF WBC: CPT

## 2024-11-12 PROCEDURE — 80069 RENAL FUNCTION PANEL: CPT

## 2024-11-12 PROCEDURE — APPNB30 APP NON BILLABLE TIME 0-30 MINS: Performed by: NURSE PRACTITIONER

## 2024-11-12 PROCEDURE — 36415 COLL VENOUS BLD VENIPUNCTURE: CPT

## 2024-11-12 PROCEDURE — 1200000000 HC SEMI PRIVATE

## 2024-11-12 PROCEDURE — 6360000002 HC RX W HCPCS: Performed by: INTERNAL MEDICINE

## 2024-11-12 PROCEDURE — 92610 EVALUATE SWALLOWING FUNCTION: CPT

## 2024-11-12 PROCEDURE — 83735 ASSAY OF MAGNESIUM: CPT

## 2024-11-12 PROCEDURE — APPSS15 APP SPLIT SHARED TIME 0-15 MINUTES: Performed by: NURSE PRACTITIONER

## 2024-11-12 PROCEDURE — 2580000003 HC RX 258: Performed by: INTERNAL MEDICINE

## 2024-11-12 PROCEDURE — 97535 SELF CARE MNGMENT TRAINING: CPT

## 2024-11-12 PROCEDURE — 84132 ASSAY OF SERUM POTASSIUM: CPT

## 2024-11-12 RX ORDER — DEXTROSE MONOHYDRATE, SODIUM CHLORIDE, SODIUM LACTATE, POTASSIUM CHLORIDE, CALCIUM CHLORIDE 5; 600; 310; 179; 20 G/100ML; MG/100ML; MG/100ML; MG/100ML; MG/100ML
INJECTION, SOLUTION INTRAVENOUS CONTINUOUS
Status: DISCONTINUED | OUTPATIENT
Start: 2024-11-12 | End: 2024-11-12

## 2024-11-12 RX ORDER — POTASSIUM CHLORIDE 7.45 MG/ML
10 INJECTION INTRAVENOUS
Status: COMPLETED | OUTPATIENT
Start: 2024-11-12 | End: 2024-11-12

## 2024-11-12 RX ORDER — HYDROCODONE BITARTRATE AND ACETAMINOPHEN 5; 325 MG/1; MG/1
1 TABLET ORAL EVERY 6 HOURS PRN
Status: DISCONTINUED | OUTPATIENT
Start: 2024-11-12 | End: 2024-11-14

## 2024-11-12 RX ORDER — LISINOPRIL 20 MG/1
20 TABLET ORAL DAILY
Status: DISCONTINUED | OUTPATIENT
Start: 2024-11-13 | End: 2024-11-14 | Stop reason: HOSPADM

## 2024-11-12 RX ORDER — VENLAFAXINE HYDROCHLORIDE 150 MG/1
150 CAPSULE, EXTENDED RELEASE ORAL
Status: DISCONTINUED | OUTPATIENT
Start: 2024-11-13 | End: 2024-11-14 | Stop reason: HOSPADM

## 2024-11-12 RX ORDER — POTASSIUM CHLORIDE 1500 MG/1
20 TABLET, EXTENDED RELEASE ORAL
Status: DISCONTINUED | OUTPATIENT
Start: 2024-11-13 | End: 2024-11-13

## 2024-11-12 RX ADMIN — POTASSIUM CHLORIDE 10 MEQ: 10 INJECTION, SOLUTION INTRAVENOUS at 08:58

## 2024-11-12 RX ADMIN — Medication 10 ML: at 20:11

## 2024-11-12 RX ADMIN — SODIUM CHLORIDE, PRESERVATIVE FREE 40 MG: 5 INJECTION INTRAVENOUS at 13:12

## 2024-11-12 RX ADMIN — POTASSIUM CHLORIDE 10 MEQ: 10 INJECTION, SOLUTION INTRAVENOUS at 09:00

## 2024-11-12 RX ADMIN — POTASSIUM CHLORIDE: 2 INJECTION, SOLUTION, CONCENTRATE INTRAVENOUS at 07:57

## 2024-11-12 RX ADMIN — POTASSIUM CHLORIDE 10 MEQ: 10 INJECTION, SOLUTION INTRAVENOUS at 11:00

## 2024-11-12 RX ADMIN — Medication 10 ML: at 07:48

## 2024-11-12 RX ADMIN — ENOXAPARIN SODIUM 40 MG: 100 INJECTION SUBCUTANEOUS at 08:00

## 2024-11-12 RX ADMIN — METOPROLOL TARTRATE 5 MG: 5 INJECTION INTRAVENOUS at 01:31

## 2024-11-12 RX ADMIN — SODIUM CHLORIDE, PRESERVATIVE FREE 40 MG: 5 INJECTION INTRAVENOUS at 01:31

## 2024-11-12 RX ADMIN — POTASSIUM CHLORIDE 10 MEQ: 10 INJECTION, SOLUTION INTRAVENOUS at 13:10

## 2024-11-12 RX ADMIN — POTASSIUM PHOSPHATE, MONOBASIC POTASSIUM PHOSPHATE, DIBASIC 20 MMOL: 224; 236 INJECTION, SOLUTION, CONCENTRATE INTRAVENOUS at 12:06

## 2024-11-12 RX ADMIN — POTASSIUM CHLORIDE 10 MEQ: 10 INJECTION, SOLUTION INTRAVENOUS at 12:04

## 2024-11-12 RX ADMIN — POTASSIUM CHLORIDE 10 MEQ: 10 INJECTION, SOLUTION INTRAVENOUS at 07:48

## 2024-11-12 RX ADMIN — METOPROLOL TARTRATE 5 MG: 5 INJECTION INTRAVENOUS at 07:58

## 2024-11-12 ASSESSMENT — PAIN SCALES - GENERAL
PAINLEVEL_OUTOF10: 0

## 2024-11-12 ASSESSMENT — PAIN SCALES - WONG BAKER
WONGBAKER_NUMERICALRESPONSE: NO HURT
WONGBAKER_NUMERICALRESPONSE: NO HURT

## 2024-11-12 NOTE — DISCHARGE INSTR - COC
Continuity of Care Form    Patient Name: Kevin Blair   :  1935  MRN:  [id number]    Admit date:  2024  Discharge date:  2024    Code Status Order: Full Code   Advance Directives:   Advance Care Flowsheet Documentation             Admitting Physician:  Elisa Krishnan DO  PCP: Blane Callahan MD    Discharging Nurse: [facility] Unit/Room#: 4TN-4468/4468-01  Discharging Unit Phone Number: [phone]    Emergency Contact:   Extended Emergency Contact Information  Primary Emergency Contact: Anjali Moore  Mobile Phone: [phone]  Relation: Child  Preferred language: English   needed? No  Secondary Emergency Contact: Vee Singh  Mobile Phone: [phone]  Relation: Friend   needed? No    Past Surgical History:  Past Surgical History:   Procedure Laterality Date    CARPAL TUNNEL RELEASE      bilat    CATARACT REMOVAL      right eye    COLECTOMY      KNEE ARTHROSCOPY      right    SHOULDER SURGERY      left    SMALL INTESTINE SURGERY N/A 2024    LAPAROTOMY EXPLORATORY, LYSIS OF ADHESIONS, SMALL BOWEL RESECTION performed by Nghia Sheridan MD at [facility] OR    TONSILLECTOMY      TOTAL HIP ARTHROPLASTY  2012    LEFT TOTAL HIP ARTHROPLASTY ANTERIOR APPROACH    TURP         Immunization History:   Immunization History   Administered Date(s) Administered    COVID-19, PFIZER PURPLE top, DILUTE for use, (age 12 y+), 30mcg/0.3mL 2021, 2021    Pneumococcal, PPSV23, PNEUMOVAX 23, (age 2y+), SC/IM, 0.5mL 2012       Active Problems:  Patient Active Problem List   Diagnosis Code    Hypertension I10    Arthritis M19.90    Diverticulitis K57.92    Primary localized osteoarthrosis, 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    SBO (small bowel obstruction) ([facility]) K56.609       Isolation/Infection:   Isolation            No Isolation          Patient Infection Status       None to display            Nurse  Assessment:  Last Vital Signs: /69   Pulse 76   Temp 98.3 °F (36.8 °C) (Oral)   Resp 16   Ht 1.753 m (5' 9\")   Wt 52.8 kg (116 lb 6.5 oz)   SpO2 93%   BMI 17.19 kg/m²     Last documented pain score (0-10 scale): Pain Level: 0  Last Weight:   Wt Readings from Last 1 Encounters:   11/10/24 52.8 kg (116 lb 6.5 oz)     Mental Status:  oriented, alert, coherent, logical, thought processes intact, and able to concentrate and follow conversation    IV Access:  - None    Nursing Mobility/ADLs:  Walking   Independent  Transfer  Independent  Bathing  Independent  Dressing  Independent  Toileting  Independent  Feeding  Independent  Med Admin  Independent  Med Delivery   Crushed    Wound Care Documentation and Therapy:  Incision 11/07/24 Abdomen Anterior (Active)   Dressing Status New dressing applied;Clean;Dry;Intact 11/12/24 1145   Dressing Change Due 11/09/24 11/11/24 2010   Incision Cleansed Cleansed with saline 11/09/24 0400   Dressing/Treatment ABD pad;Moist to dry 11/12/24 0751   Closure Retention sutures 11/08/24 0112   Incision Assessment Other (Comment) 11/12/24 0751   Drainage Amount Scant (moist but unmeasurable) 11/09/24 0400   Drainage Description Serosanguinous 11/09/24 0400   Odor None 11/09/24 0400   Viola-incision Assessment Intact 11/09/24 0400   Number of days: 4        Elimination:  Continence:   Bowel: Yes  Bladder: Yes  Urinary Catheter: None   Colostomy/Ileostomy/Ileal Conduit: No       Date of Last BM: 11/13/2024  No intake or output data in the 24 hours ending 11/12/24 1534  I/O last 3 completed shifts:  In: 933 [I.V.:933]  Out: 750 [Urine:650; Emesis/NG output:100]    Safety Concerns:     At Risk for Falls and Aspiration Risk    Impairments/Disabilities:      Dysphagia    Nutrition Therapy:  Current Nutrition Therapy:   - Oral Diet:  Dysphagia - Pureed and Dysphagia - Minced and Moist    Routes of Feeding: Oral  Liquids: Thin Liquids  Daily Fluid Restriction: no  Last Modified Barium

## 2024-11-12 NOTE — PROGRESS NOTES
RW from EOB and standard toilet, CGA >>>>>SBA  Functional Mobility  Functional Mobility Activity: 300' in deluca + to/from BR  Device Use: rolling walker  Required Assistance: stand by assistance, contact guard assistance  Comment: A for IV line mgmt, quick pace, narrow EDEN, no LOB, tolerates well; no rest required  Balance:  Static Sitting Balance: good: independent with functional balance in unsupported position  Dynamic Sitting Balance: good: independent with functional balance in unsupported position  Static Standing Balance: fair (-): maintains balance at CGA with use of UE support  Dynamic Standing Balance: fair (-): maintains balance at CGA with use of UE support  Comments: Standing balance CGA>>>>close SBA    Other Therapeutic Interventions  Recliner chair provided to pt room for pt to remain OOB, pt positioned for postural comfort and support at EOS.     Functional Outcomes  AM-PAC Inpatient Daily Activity Raw Score: 18          Cognition  WFL  Orientation:    alert and oriented x 4  Command Following:   WFL     Education  Barriers To Learning: none  Patient Education: patient educated on goals, OT role and benefits, plan of care, precautions, ADL adaptive strategies, discharge recommendations  Learning Assessment:  patient verbalizes understanding, would benefit from continued reinforcement    Assessment  Activity Tolerance: Good  Impairments Requiring Therapeutic Intervention: decreased functional mobility, decreased ADL status, decreased endurance, decreased balance, increased pain  Prognosis: good  Clinical Assessment: Patient typically independent with ADLs and IADLs. Post op from surgery for SBO. Pt progressing well towards acute care goals, currently SBA for bed mobility and CGA/SBA for functional transfers/mobility with RW and Setup/SBA for LB dressing. Pt with daughter arriving this date to stay with pt 24/7 upon d/c to home to provide SUP/assist as needed. Patient presents with the above deficits

## 2024-11-12 NOTE — PROGRESS NOTES
[facility]    11/12/2024 2:32 PM        Name: Kevin Blair .              Admitted: 11/7/2024  Primary Care Provider: Blane Callahan MD (Tel: [phone])      Subjective:  .  Patient seen. He is in bed. NG out and full liquids ordered however he has not had this yet. Had BM.    POD # 5 from emergent exploratory lap with lysis of adhesions for small bowel obstruction. 10/7/2024    Reviewed interval ancillary notes    Current Medications  potassium phosphate 20 mmol in sodium chloride 0.9 % 500 mL IVPB, Once  HYDROcodone-acetaminophen (NORCO) 5-325 MG per tablet 1 tablet, Q6H PRN  metoprolol (LOPRESSOR) injection 5 mg, Q6H  sodium chloride flush 0.9 % injection 10 mL, 2 times per day  sodium chloride flush 0.9 % injection 10 mL, PRN  0.9 % sodium chloride infusion, PRN  melatonin tablet 3 mg, Nightly PRN  acetaminophen (TYLENOL) tablet 650 mg, Q6H PRN   Or  acetaminophen (TYLENOL) suppository 650 mg, Q6H PRN  pantoprazole (PROTONIX) 40 mg in sodium chloride (PF) 0.9 % 10 mL injection, Q12H  enoxaparin (LOVENOX) injection 40 mg, Daily  phenol 1.4 % mouth spray 1 spray, Q2H PRN  ondansetron (ZOFRAN) injection 4 mg, Q6H PRN        Objective:  /68   Pulse 86   Temp 98.2 °F (36.8 °C) (Oral)   Resp 16   Ht 1.753 m (5' 9\")   Wt 52.8 kg (116 lb 6.5 oz)   SpO2 92%   BMI 17.19 kg/m²     Intake/Output Summary (Last 24 hours) at 11/12/2024 1432  Last data filed at 11/11/2024 1450  Gross per 24 hour   Intake 500 ml   Output 350 ml   Net 150 ml      Wt Readings from Last 3 Encounters:   11/10/24 52.8 kg (116 lb 6.5 oz)   06/21/12 83.9 kg (185 lb)   05/02/12 83.9 kg (185 lb)       General appearance:  Appears comfortable, alert and pleasant.  Looks younger than stated age   Eyes: Sclera clear. Pupils equal.  ENT: Moist oral mucosa. Trachea midline, no adenopathy.  Cardiovascular: Regular rhythm, normal S1, S2. No murmur. No edema in lower extremities  Respiratory: Not using

## 2024-11-12 NOTE — PROGRESS NOTES
[facility]        Progress Note    Patient Name: Kevin Blair  MRN: [id number]  YOB: 1935  Date of Evaluation: 2024    Subjective:  No acute events overnight  Pain controlled without narcotics  No nausea or vomiting, NGT in place with minimal output  Passing stool x2  Resting in bed at this time    Post-Operative Day #5      Vital Signs:  Patient Vitals for the past 24 hrs:   BP Temp Temp src Pulse Resp SpO2   24 0756 129/68 98.2 °F (36.8 °C) Oral 86 16 92 %   24 0344 (!) 156/79 98 °F (36.7 °C) Oral 93 16 96 %   24 0045 125/72 98.2 °F (36.8 °C) Oral 81 16 95 %   24 137/75 98.3 °F (36.8 °C) Oral 97 17 93 %      TEMPERATURE HISTORY 24H: Temp (24hrs), Av.2 °F (36.8 °C), Min:98 °F (36.7 °C), Max:98.3 °F (36.8 °C)    BLOOD PRESSURE HISTORY: Systolic (36hrs), Av , Min:123 , Max:156    Diastolic (36hrs), Av, Min:68, Max:79      Intake/Output:  I/O last 3 completed shifts:  In: 933 [I.V.:933]  Out: 750 [Urine:650; Emesis/NG output:100]  No intake/output data recorded.  Drain/tube Output:       Physical Exam:  General: awake, alert, no acute distress  Lungs: unlabored respirations  Abdomen: soft, non-distended, incisional tenderness only, bowel sounds present  Skin/Wound: open midline abdominal wound bed is generally clean, only small fibrinous exudate/slight green hue, fascia is intact--dressing changed    Labs:  CBC:    Recent Labs     11/10/24  0430 11/11/24  0954 24  0559   WBC 7.8 6.5 5.9   HGB 11.7* 11.5* 10.1*   HCT 33.4* 34.1* 30.1*    183 160     BMP:    Recent Labs     11/10/24  0430 11/11/24  0954 24  0559   * 141 145   K 3.5 3.0* 2.6*    102 107   CO2 27 30 29   BUN 17 19 22*   CREATININE 0.9 0.8 0.8   GLUCOSE 111* 122* 121*     Hepatic:    Recent Labs     11/10/24  0430   AST 33   ALT 14   BILITOT 0.8   ALKPHOS 56     Amylase:  No results found for: \"AMYLASE\"  Lipase:    Lab  Results   Component Value Date/Time    LIPASE 12.0 11/07/2024 07:53 PM      Mag:    Lab Results   Component Value Date/Time    MG 2.28 11/12/2024 05:59 AM    MG 2.09 11/11/2024 09:54 AM     Phos:     Lab Results   Component Value Date/Time    PHOS 2.0 11/12/2024 05:59 AM    PHOS 1.9 11/11/2024 09:54 AM      Coags:   Lab Results   Component Value Date/Time    PROTIME 13.9 07/11/2012 08:50 AM    INR 1.1 07/11/2012 08:50 AM    APTT 31.9 06/21/2012 01:15 PM       Cultures:  Anaerobic culture  No results found for: \"LABANAE\"  Fungus stain  No results found for requested labs within last 30 days.     Gram stain  No results found for requested labs within last 30 days.     Organism  No results found for: \"ORG\"  Surgical culture  No results found for: \"CXSURG\"  Blood culture 1  No results found for requested labs within last 30 days.     Blood culture 2  No results found for requested labs within last 30 days.     Fecal occult  No results found for requested labs within last 30 days.     GI bacterial pathogens by PCR  No results found for requested labs within last 30 days.     C. difficile  No results found for requested labs within last 30 days.     Urine culture  No results found for: \"LABURIN\"    Pathology:  OR 11/7/2024--FINAL DIAGNOSIS:     Small intestine, resection;      - Small intestine with marked transmural congestion and hemorrhage, compatible with volvulus/ ischemic type of injury.     Imaging:  I have personally reviewed the following films:    No results found.    Scheduled Meds:   potassium phosphate IVPB (PERIPHERAL LINE)  20 mmol IntraVENous Once    metoprolol  5 mg IntraVENous Q6H    sodium chloride flush  10 mL IntraVENous 2 times per day    pantoprazole (PROTONIX) 40 mg in sodium chloride (PF) 0.9 % 10 mL injection  40 mg IntraVENous Q12H    enoxaparin  40 mg SubCUTAneous Daily     Continuous Infusions:   sodium chloride       PRN Meds:.sodium chloride flush, sodium chloride, melatonin, acetaminophen

## 2024-11-12 NOTE — PROGRESS NOTES
Pt AOX4, VSS on room air. All morning medications given per order. Shift assessment completed. NG with minimal output. Denies pain or discomfort. No further needs at this time. Call light within reach.     Electronically signed by Mirian Alberto RN on 11/12/2024 at 10:26 AM

## 2024-11-12 NOTE — PLAN OF CARE
Problem: Discharge Planning  Goal: Discharge to home or other facility with appropriate resources  11/12/2024 0754 by Mirian Alberto RN  Flowsheets (Taken 11/12/2024 0754)  Discharge to home or other facility with appropriate resources:   Identify barriers to discharge with patient and caregiver   Arrange for needed discharge resources and transportation as appropriate   Identify discharge learning needs (meds, wound care, etc)     Problem: Pain  Goal: Verbalizes/displays adequate comfort level or baseline comfort level  11/12/2024 0754 by Mirian Alberto RN  Flowsheets (Taken 11/12/2024 0754)  Verbalizes/displays adequate comfort level or baseline comfort level:   Administer analgesics based on type and severity of pain and evaluate response   Assess pain using appropriate pain scale   Encourage patient to monitor pain and request assistance     Problem: ABCDS Injury Assessment  Goal: Absence of physical injury  11/12/2024 0754 by Mirian Alberto RN  Flowsheets (Taken 11/12/2024 0754)  Absence of Physical Injury: Implement safety measures based on patient assessment     Problem: Metabolic/Fluid and Electrolytes - Adult  Goal: Electrolytes maintained within normal limits  11/12/2024 0754 by Mirian Alberto RN  Flowsheets (Taken 11/12/2024 0754)  Electrolytes maintained within normal limits:   Monitor labs and assess patient for signs and symptoms of electrolyte imbalances   Monitor response to electrolyte replacements, including repeat lab results as appropriate   Administer electrolyte replacement as ordered

## 2024-11-12 NOTE — PROGRESS NOTES
Speech Language Pathology  [facility] - Inpatient Rehabilitation Services  [phone]  SLP Clinical Swallow Evaluation       Patient: Kevin Blair   : 1935   MRN: [id number]      Evaluation Date: 2024      Admitting Dx: Nausea [R11.0]  SBO (small bowel obstruction) ([facility]) [K56.609]  Upper abdominal pain [R10.10]  Treatment Diagnosis: Oropharyngeal Dysphagia   Pain: Did not state                                  Recommendations      Recommended Diet and Intervention 2024:  Diet Solids Recommendation:  Full liquids  (per MD)    Recommend ST evaluation once cleared for solids Liquid Consistency Recommendation:  Mildly (nectar) thick liquids  Recommended form of Meds: Meds crushed as able in puree          Compensatory strategies: Alternate solids/liquids , Upright as possible with all PO intake , Small bites/sips , Eat/feed slowly, Remain upright 30-45 min , Aspiration Precautions     Discharge Recommendations:  Discharge recommendations to be determined pending ongoing follow-up during acute care stay    History/Course of Treatment     H&P: 89 y.o. male who presented to [facility] with past medical history hypertension, hyperlipidemia, arthritis, presented ED with chief complaint of abdominal pain     Patient reported symptoms started suddenly around 12 PM this afternoon reported that she has drank milk and then started having some abdominal pain epigastric right upper quadrant progressively worsening would go up to 10/10 with cramping and distention.  Patient also admits to having nausea and vomiting reported that the vomit was nonbloody or coffee-ground withno fever or diarrhea chest pain shortness of breath or dysuria.  Patient does report having history of diverticulosis and diverticulitis where she had a resection previously and that this pain is different and worse    Imaging:  Chest X-ray: Enteric catheter tip terminates in the gastric fundus directed laterally.

## 2024-11-12 NOTE — CARE COORDINATION
Case Management Assessment  Initial Evaluation    Date/Time of Evaluation: 11/12/2024 3:31 PM  Assessment Completed by: RUY Vogel    If patient is discharged prior to next notation, then this note serves as note for discharge by case management.    Patient Name: Kevin Blair                   YOB: 1935  Diagnosis: Nausea [R11.0]  SBO (small bowel obstruction) (HCC) [K56.609]  Upper abdominal pain [R10.10]                   Date / Time: 11/7/2024  7:05 PM    Patient Admission Status: Inpatient   Readmission Risk (Low < 19, Mod (19-27), High > 27): Readmission Risk Score: 14.4    Current PCP: Blane Callahan MD  PCP verified by CM? Yes    Chart Reviewed: Yes      History Provided by: Patient  Patient Orientation: Alert and Oriented    Patient Cognition: Alert    Hospitalization in the last 30 days (Readmission):  No    If yes, Readmission Assessment in  Navigator will be completed.    Advance Directives:      Code Status: Full Code   Patient's Primary Decision Maker is: Legal Next of Kin      Discharge Planning:    Patient lives with: (P) Alone Type of Home: (P) House  Primary Care Giver: Self  Patient Support Systems include: None   Current Financial resources: None  Current community resources: None  Current services prior to admission: (P) Durable Medical Equipment            Current DME: (P) Cane            Type of Home Care services:  (P) PT, OT    ADLS  Prior functional level: Independent in ADLs/IADLs  Current functional level: Assistance with the following:, Mobility    PT AM-PAC: 18 /24  OT AM-PAC: 18 /24    Family can provide assistance at DC: Yes (Patient states that his daughter who lives out of town will be visiting to assist with his discharge.)  Would you like Case Management to discuss the discharge plan with any other family members/significant others, and if so, who? No  Plans to Return to Present Housing: Yes  Other Identified Issues/Barriers to RETURNING to current

## 2024-11-12 NOTE — PLAN OF CARE
Problem: Discharge Planning  Goal: Discharge to home or other facility with appropriate resources  Outcome: Progressing  Flowsheets (Taken 11/11/2024 2010)  Discharge to home or other facility with appropriate resources:   Identify barriers to discharge with patient and caregiver   Arrange for needed discharge resources and transportation as appropriate     Problem: Pain  Goal: Verbalizes/displays adequate comfort level or baseline comfort level  Outcome: Progressing     Problem: Safety - Adult  Goal: Free from fall injury  Outcome: Progressing     Problem: Skin/Tissue Integrity  Goal: Absence of new skin breakdown  Description: 1.  Monitor for areas of redness and/or skin breakdown  2.  Assess vascular access sites hourly  3.  Every 4-6 hours minimum:  Change oxygen saturation probe site  4.  Every 4-6 hours:  If on nasal continuous positive airway pressure, respiratory therapy assess nares and determine need for appliance change or resting period.  Outcome: Progressing     Problem: ABCDS Injury Assessment  Goal: Absence of physical injury  Outcome: Progressing  Flowsheets (Taken 11/11/2024 2010)  Absence of Physical Injury: Implement safety measures based on patient assessment     Problem: Metabolic/Fluid and Electrolytes - Adult  Goal: Electrolytes maintained within normal limits  Outcome: Progressing  Goal: Hemodynamic stability and optimal renal function maintained  Outcome: Progressing  Goal: Glucose maintained within prescribed range  Outcome: Progressing

## 2024-11-13 LAB
ALBUMIN SERPL-MCNC: 2.8 G/DL (ref 3.4–5)
ANION GAP SERPL CALCULATED.3IONS-SCNC: 9 MMOL/L (ref 3–16)
BASOPHILS # BLD: 0 K/UL (ref 0–0.2)
BASOPHILS NFR BLD: 0.1 %
BUN SERPL-MCNC: 19 MG/DL (ref 7–20)
CALCIUM SERPL-MCNC: 10.2 MG/DL (ref 8.3–10.6)
CHLORIDE SERPL-SCNC: 108 MMOL/L (ref 99–110)
CO2 SERPL-SCNC: 30 MMOL/L (ref 21–32)
CREAT SERPL-MCNC: 0.8 MG/DL (ref 0.8–1.3)
DEPRECATED RDW RBC AUTO: 13.3 % (ref 12.4–15.4)
EOSINOPHIL # BLD: 0.1 K/UL (ref 0–0.6)
EOSINOPHIL NFR BLD: 2.5 %
GFR SERPLBLD CREATININE-BSD FMLA CKD-EPI: 85 ML/MIN/{1.73_M2}
GLUCOSE SERPL-MCNC: 105 MG/DL (ref 70–99)
HCT VFR BLD AUTO: 28.6 % (ref 40.5–52.5)
HGB BLD-MCNC: 9.6 G/DL (ref 13.5–17.5)
LYMPHOCYTES # BLD: 0.2 K/UL (ref 1–5.1)
LYMPHOCYTES NFR BLD: 4.6 %
MAGNESIUM SERPL-MCNC: 2.21 MG/DL (ref 1.8–2.4)
MCH RBC QN AUTO: 31.8 PG (ref 26–34)
MCHC RBC AUTO-ENTMCNC: 33.7 G/DL (ref 31–36)
MCV RBC AUTO: 94.5 FL (ref 80–100)
MONOCYTES # BLD: 0.4 K/UL (ref 0–1.3)
MONOCYTES NFR BLD: 8.4 %
NEUTROPHILS # BLD: 4.5 K/UL (ref 1.7–7.7)
NEUTROPHILS NFR BLD: 84.4 %
PHOSPHATE SERPL-MCNC: 2.2 MG/DL (ref 2.5–4.9)
PLATELET # BLD AUTO: 179 K/UL (ref 135–450)
PMV BLD AUTO: 8.3 FL (ref 5–10.5)
POTASSIUM SERPL-SCNC: 3.2 MMOL/L (ref 3.5–5.1)
RBC # BLD AUTO: 3.02 M/UL (ref 4.2–5.9)
SODIUM SERPL-SCNC: 147 MMOL/L (ref 136–145)
WBC # BLD AUTO: 5.3 K/UL (ref 4–11)

## 2024-11-13 PROCEDURE — 83735 ASSAY OF MAGNESIUM: CPT

## 2024-11-13 PROCEDURE — 6360000002 HC RX W HCPCS: Performed by: NURSE PRACTITIONER

## 2024-11-13 PROCEDURE — 2580000003 HC RX 258: Performed by: INTERNAL MEDICINE

## 2024-11-13 PROCEDURE — 80069 RENAL FUNCTION PANEL: CPT

## 2024-11-13 PROCEDURE — 6370000000 HC RX 637 (ALT 250 FOR IP): Performed by: PHYSICIAN ASSISTANT

## 2024-11-13 PROCEDURE — 2500000003 HC RX 250 WO HCPCS: Performed by: SURGERY

## 2024-11-13 PROCEDURE — 2580000003 HC RX 258: Performed by: SURGERY

## 2024-11-13 PROCEDURE — 1200000000 HC SEMI PRIVATE

## 2024-11-13 PROCEDURE — 36415 COLL VENOUS BLD VENIPUNCTURE: CPT

## 2024-11-13 PROCEDURE — APPSS15 APP SPLIT SHARED TIME 0-15 MINUTES: Performed by: NURSE PRACTITIONER

## 2024-11-13 PROCEDURE — 85025 COMPLETE CBC W/AUTO DIFF WBC: CPT

## 2024-11-13 PROCEDURE — 92526 ORAL FUNCTION THERAPY: CPT

## 2024-11-13 PROCEDURE — APPNB30 APP NON BILLABLE TIME 0-30 MINS: Performed by: NURSE PRACTITIONER

## 2024-11-13 PROCEDURE — 6360000002 HC RX W HCPCS: Performed by: INTERNAL MEDICINE

## 2024-11-13 RX ADMIN — LISINOPRIL 20 MG: 20 TABLET ORAL at 08:22

## 2024-11-13 RX ADMIN — ENOXAPARIN SODIUM 40 MG: 100 INJECTION SUBCUTANEOUS at 08:22

## 2024-11-13 RX ADMIN — SODIUM CHLORIDE, PRESERVATIVE FREE 40 MG: 5 INJECTION INTRAVENOUS at 00:25

## 2024-11-13 RX ADMIN — POTASSIUM PHOSPHATE, MONOBASIC POTASSIUM PHOSPHATE, DIBASIC 20 MMOL: 224; 236 INJECTION, SOLUTION, CONCENTRATE INTRAVENOUS at 12:51

## 2024-11-13 RX ADMIN — Medication 10 ML: at 08:23

## 2024-11-13 RX ADMIN — POTASSIUM BICARBONATE 50 MEQ: 978 TABLET, EFFERVESCENT ORAL at 08:51

## 2024-11-13 RX ADMIN — Medication 10 ML: at 22:38

## 2024-11-13 RX ADMIN — SODIUM CHLORIDE, PRESERVATIVE FREE 40 MG: 5 INJECTION INTRAVENOUS at 12:50

## 2024-11-13 RX ADMIN — VENLAFAXINE HYDROCHLORIDE 150 MG: 150 CAPSULE, EXTENDED RELEASE ORAL at 08:22

## 2024-11-13 ASSESSMENT — PAIN SCALES - WONG BAKER

## 2024-11-13 ASSESSMENT — PAIN SCALES - GENERAL
PAINLEVEL_OUTOF10: 0

## 2024-11-13 NOTE — PLAN OF CARE
Problem: Discharge Planning  Goal: Discharge to home or other facility with appropriate resources  11/12/2024 2153 by Roman White, RN  Outcome: Progressing   Identify barriers to discharge with patient and caregiver   Arrange for needed discharge resources and transportation as appropriate   Identify discharge learning needs (meds, wound care, etc)   Problem: Pain  Goal: Verbalizes/displays adequate comfort level or baseline comfort level  11/12/2024 2153 by Roman White, RN  Outcome: Progressing     Problem: Safety - Adult  Goal: Free from fall injury  Outcome: Progressing

## 2024-11-13 NOTE — PROGRESS NOTES
Pt transported to room 5577. Report called. All evening meds given per order.       Electronically signed by Mirian Alberto RN on 11/13/2024 at 5:33 PM

## 2024-11-13 NOTE — PROGRESS NOTES
Speech Language Pathology  [facility] Inpatient [facility] Services  [phone]  SLP Dysphagia Treatment       Patient: Kevin Blair   : 1935   MRN: [id number]      Evaluation Date: 2024      Admitting Dx: Nausea [R11.0]  SBO (small bowel obstruction) (HCC) [K56.609]  Upper abdominal pain [R10.10]  Treatment Diagnosis: Oropharyngeal Dysphagia   Pain: Did not state                                  Recommendations      Recommended Diet and Intervention 2024:  Diet Solids Recommendation:  Dysphagia II Minced and Moist   Liquid Consistency Recommendation:  Thin liquids  Recommended form of Meds: Meds crushed as able in puree    Recommend completion of Modified Barium Swallow study to further assess pharyngeal phase of swallow      Compensatory strategies: Alternate solids/liquids , Upright as possible with all PO intake , Small bites/sips , Eat/feed slowly, Remain upright 30-45 min , Aspiration Precautions     Discharge Recommendations:  Discharge recommendations to be determined pending ongoing follow-up during acute care stay    History/Course of Treatment     H&P: 89 y.o. male who presented to [facility] with past medical history hypertension, hyperlipidemia, arthritis, presented ED with chief complaint of abdominal pain     Patient reported symptoms started suddenly around 12 PM this afternoon reported that she has drank milk and then started having some abdominal pain epigastric right upper quadrant progressively worsening would go up to 10/10 with cramping and distention.  Patient also admits to having nausea and vomiting reported that the vomit was nonbloody or coffee-ground withno fever or diarrhea chest pain shortness of breath or dysuria.  Patient does report having history of diverticulosis and diverticulitis where she had a resection previously and that this pain is different and worse    Imaging:  Chest X-ray: Enteric catheter tip terminates in the gastric

## 2024-11-13 NOTE — PLAN OF CARE
Problem: Discharge Planning  Goal: Discharge to home or other facility with appropriate resources  11/13/2024 0824 by Mirian Alberto RN  Flowsheets (Taken 11/13/2024 0824)  Discharge to home or other facility with appropriate resources:   Identify barriers to discharge with patient and caregiver   Arrange for needed discharge resources and transportation as appropriate   Identify discharge learning needs (meds, wound care, etc)     Problem: Pain  Goal: Verbalizes/displays adequate comfort level or baseline comfort level  11/13/2024 0824 by Mirian Alberto RN  Flowsheets  Taken 11/13/2024 0824 by Mirian Alberto RN  Verbalizes/displays adequate comfort level or baseline comfort level:   Encourage patient to monitor pain and request assistance   Assess pain using appropriate pain scale   Administer analgesics based on type and severity of pain and evaluate response   Implement non-pharmacological measures as appropriate and evaluate response       Problem: Safety - Adult  Goal: Free from fall injury  11/13/2024 0824 by Mirian Alberto RN  Flowsheets  Taken 11/13/2024 0824 by Mirian Alberto RN  Free From Fall Injury: Instruct family/caregiver on patient safety       Problem: ABCDS Injury Assessment  Goal: Absence of physical injury  Flowsheets  Taken 11/13/2024 0824 by Mirian Alberto RN  Absence of Physical Injury: Implement safety measures based on patient assessment  Taken 11/12/2024 2215 by Roman White RN  Absence of Physical Injury: Implement safety measures based on patient assessment     Problem: Metabolic/Fluid and Electrolytes - Adult  Goal: Electrolytes maintained within normal limits  Flowsheets  Taken 11/13/2024 0824 by Mirian Alberto RN  Electrolytes maintained within normal limits:   Monitor labs and assess patient for signs and symptoms of electrolyte imbalances   Administer electrolyte replacement as ordered   Monitor response to electrolyte replacements, including repeat lab results as

## 2024-11-13 NOTE — PROGRESS NOTES
[facility]    11/13/2024 11:53 AM        Name: Kevin Blair .              Admitted: 11/7/2024  Primary Care Provider: Blane Callahan MD (Tel: [phone])      Subjective:  .  Patient seen. Having BMs. Tolerating full liquids. No nausea.    POD # 6 from emergent exploratory lap with lysis of adhesions for small bowel obstruction. 10/7/2024    Reviewed interval ancillary notes    Current Medications  HYDROcodone-acetaminophen (NORCO) 5-325 MG per tablet 1 tablet, Q6H PRN  venlafaxine (EFFEXOR XR) extended release capsule 150 mg, Daily with breakfast  lisinopril (PRINIVIL;ZESTRIL) tablet 20 mg, Daily  sodium chloride flush 0.9 % injection 10 mL, 2 times per day  sodium chloride flush 0.9 % injection 10 mL, PRN  0.9 % sodium chloride infusion, PRN  melatonin tablet 3 mg, Nightly PRN  acetaminophen (TYLENOL) tablet 650 mg, Q6H PRN   Or  acetaminophen (TYLENOL) suppository 650 mg, Q6H PRN  pantoprazole (PROTONIX) 40 mg in sodium chloride (PF) 0.9 % 10 mL injection, Q12H  enoxaparin (LOVENOX) injection 40 mg, Daily  phenol 1.4 % mouth spray 1 spray, Q2H PRN  ondansetron (ZOFRAN) injection 4 mg, Q6H PRN        Objective:  /72   Pulse 87   Temp 97.6 °F (36.4 °C) (Oral)   Resp 16   Ht 1.753 m (5' 9\")   Wt 59.9 kg (132 lb)   SpO2 95%   BMI 19.49 kg/m²     Intake/Output Summary (Last 24 hours) at 11/13/2024 1153  Last data filed at 11/13/2024 1003  Gross per 24 hour   Intake 340 ml   Output --   Net 340 ml      Wt Readings from Last 3 Encounters:   11/13/24 59.9 kg (132 lb)   06/21/12 83.9 kg (185 lb)   05/02/12 83.9 kg (185 lb)       General appearance:  Appears comfortable, alert and pleasant.  Looks younger than stated age   Eyes: Sclera clear. Pupils equal.  ENT: Moist oral mucosa. Trachea midline, no adenopathy.  Cardiovascular: Regular rhythm, normal S1, S2. No murmur. No edema in lower extremities  Respiratory: Not using accessory muscles. Good inspiratory  effort. Clear to auscultation bilaterally, no wheeze or crackles.   GI: Abdomen soft and flat with absent bowel sounds.  Operative dressing is clean dry intact.  NG with scant green drainage   Musculoskeletal: No cyanosis in digits, neck supple  Neurology: CN 2-12 grossly intact. No speech or motor deficits  Psych: Normal affect. Alert and oriented in time, place and person  Skin: Warm, dry, normal turgor    Labs and Tests:  CBC:   Recent Labs     11/11/24  0954 11/12/24  0559 11/13/24  0558   WBC 6.5 5.9 5.3   HGB 11.5* 10.1* 9.6*    160 179     BMP:    Recent Labs     11/11/24  0954 11/12/24  0559 11/12/24  1551 11/13/24  0558    145  --  147*   K 3.0* 2.6* 3.5 3.2*    107  --  108   CO2 30 29  --  30   BUN 19 22*  --  19   CREATININE 0.8 0.8  --  0.8   GLUCOSE 122* 121*  --  105*     Hepatic:   No results for input(s): \"AST\", \"ALT\", \"BILITOT\", \"ALKPHOS\" in the last 72 hours.    Invalid input(s): \"ALB\"      CT abd pelvis:  11/7/2024    IMPRESSION:  1. Small bowel obstruction with twisting mesentery and findings concerning  for closed loop obstruction such as internal hernia.  The amount of mucosal  enhancement within the dilated loops of bowel is diminished, concerning for  vascular compromise.  2. Small free pelvic fluid.  Critical results were called by Dr. Dominick Barrera to Beth Guanall on  11/7/2024 at 21:44.    Problem List  Principal Problem:    SBO (small bowel obstruction) (HCC)  Resolved Problems:    * No resolved hospital problems. *       Assessment & Plan:   SBO:  s/p emergent ROSA with Small bowel resection: POD # 6. He is moving bowels and tolerating full liquids. Advance to general diet per surgery. Discussed with surgery. Plan for dc tomorrow. Continue local wound care no place for secondary closure.    HTN: continue lisinpril. Hold chlorthalidone for now.   Dysphagia-on nectar thick liquids. Patient does not like this therefore not drinking much. Speech to reassess now that he is

## 2024-11-13 NOTE — PROGRESS NOTES
[facility]        Progress Note    Patient Name: Kevin Blair  MRN: [id number]  YOB: 1935  Date of Evaluation: 2024    Subjective:  No acute events overnight  Pain controlled without narcotics  No nausea or vomiting since NGT removed, tolerating full liquids  Passing stool  Up to chair at this time    Post-Operative Day #6      Vital Signs:  Patient Vitals for the past 24 hrs:   BP Temp Temp src Pulse Resp SpO2 Weight   24 1102 136/72 97.6 °F (36.4 °C) Oral 87 16 95 % --   24 0822 134/67 97.5 °F (36.4 °C) Oral 67 16 96 % --   24 0600 -- -- -- -- -- -- 59.9 kg (132 lb)   24 0345 138/75 98.1 °F (36.7 °C) Oral 98 16 95 % --   24 0015 126/68 98.2 °F (36.8 °C) Axillary 91 18 94 % --   24 2000 (!) 169/83 97.3 °F (36.3 °C) Oral (!) 101 18 94 % --   24 1454 125/69 98.3 °F (36.8 °C) Oral 76 16 93 % --      TEMPERATURE HISTORY 24H: Temp (24hrs), Av.8 °F (36.6 °C), Min:97.3 °F (36.3 °C), Max:98.3 °F (36.8 °C)    BLOOD PRESSURE HISTORY: Systolic (36hrs), Av , Min:125 , Max:169    Diastolic (36hrs), Av, Min:67, Max:83      Intake/Output:  I/O last 3 completed shifts:  In: 290 [P.O.:290]  Out: -   I/O this shift:  In: 50 [P.O.:50]  Out: -   Drain/tube Output:       Physical Exam:  General: awake, alert, no acute distress  Lungs: unlabored respirations  Abdomen: soft, non-distended, incisional tenderness only, bowel sounds present  Skin/Wound: open midline abdominal wound bed with small-moderate fibrinous exudate/slight green hue, fascia is intact--dressing changed    Labs:  CBC:    Recent Labs     24  0954 24  0559 24  0558   WBC 6.5 5.9 5.3   HGB 11.5* 10.1* 9.6*   HCT 34.1* 30.1* 28.6*    160 179     BMP:    Recent Labs     24  0954 24  0559 24  1551 24  0558    145  --  147*   K 3.0* 2.6* 3.5 3.2*    107  --  108   CO2 30 29  --  30   BUN 19 22*   IntraVENous 2 times per day    pantoprazole (PROTONIX) 40 mg in sodium chloride (PF) 0.9 % 10 mL injection  40 mg IntraVENous Q12H    enoxaparin  40 mg SubCUTAneous Daily     Continuous Infusions:   sodium chloride       PRN Meds:.HYDROcodone 5 mg - acetaminophen, sodium chloride flush, sodium chloride, melatonin, acetaminophen **OR** acetaminophen, phenol, ondansetron      Assessment:  89 y.o. male admitted with   1. SBO (small bowel obstruction) (HCC)    2. Upper abdominal pain    3. 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 Pain controlled without narcotics, incisional tenderness only on exam, wound bed with small-moderate fibrinous exudate/slight green hue, no nausea or vomiting since NGT removed, tolerating full liquids, passing stool, vitals/labs stable; continued supportive care, will not be ready for secondary wound closure tomorrow--continue local wound care with Dakins wet-to-dry dressings BID  2. Advance to regular diet with supplements as tolerated; monitor bowel function  3. Monitor and correct electrolytes  4. Activity as tolerated, ambulate TID, up to chair for meals--PT/OT following  5. Pulmonary toilet, incentive spirometry  6. PRN analgesics and antiemetics--minimizing narcotics as tolerated, transition to PO  7. DVT prophylaxis with  Lovenox and SCD's  8. Management of medical comorbid etiologies per primary team and consulting services  9. Disposition: Anticipate discharge in the next 24-48 hours, patient lives alone and may benefit from SNF at discharge-- is following    EDUCATION:  Educated patient on plan of care and disease process--all questions answered.    Plans discussed with patient and nursing.  Reviewed and discussed with Dr. Sheridan.      Signed:  Susy Ashraf, MARIELENA - CNP  11/13/2024 1:58 PM    Doing well  Wound not ready for

## 2024-11-13 NOTE — PROGRESS NOTES
Occupational Therapy  OT Tx attempt this afternoon; however, pt declined participation at this time secondary to feeling depressed regarding change in rehab plans.  Pt declined functional mobility, need to void in BR and ADL's at this time. Pt remains seated in chair with needs in reach and RN present. Thank you, HUDSON Stearns, OTR/L, [id number].

## 2024-11-14 ENCOUNTER — APPOINTMENT (OUTPATIENT)
Dept: GENERAL RADIOLOGY | Age: 89
DRG: 330 | End: 2024-11-14
Payer: MEDICARE

## 2024-11-14 VITALS
WEIGHT: 132 LBS | TEMPERATURE: 97.7 F | RESPIRATION RATE: 17 BRPM | HEIGHT: 69 IN | DIASTOLIC BLOOD PRESSURE: 65 MMHG | BODY MASS INDEX: 19.55 KG/M2 | OXYGEN SATURATION: 95 % | SYSTOLIC BLOOD PRESSURE: 123 MMHG | HEART RATE: 75 BPM

## 2024-11-14 PROBLEM — E44.0 MODERATE MALNUTRITION (HCC): Chronic | Status: ACTIVE | Noted: 2024-11-14

## 2024-11-14 LAB
ANION GAP SERPL CALCULATED.3IONS-SCNC: 7 MMOL/L (ref 3–16)
BASOPHILS # BLD: 0 K/UL (ref 0–0.2)
BASOPHILS NFR BLD: 0.2 %
BUN SERPL-MCNC: 17 MG/DL (ref 7–20)
CALCIUM SERPL-MCNC: 9.9 MG/DL (ref 8.3–10.6)
CHLORIDE SERPL-SCNC: 106 MMOL/L (ref 99–110)
CO2 SERPL-SCNC: 31 MMOL/L (ref 21–32)
CREAT SERPL-MCNC: 0.8 MG/DL (ref 0.8–1.3)
DEPRECATED RDW RBC AUTO: 13.6 % (ref 12.4–15.4)
EOSINOPHIL # BLD: 0.3 K/UL (ref 0–0.6)
EOSINOPHIL NFR BLD: 5.8 %
GFR SERPLBLD CREATININE-BSD FMLA CKD-EPI: 85 ML/MIN/{1.73_M2}
GLUCOSE SERPL-MCNC: 116 MG/DL (ref 70–99)
HCT VFR BLD AUTO: 26.1 % (ref 40.5–52.5)
HGB BLD-MCNC: 8.8 G/DL (ref 13.5–17.5)
LYMPHOCYTES # BLD: 0.4 K/UL (ref 1–5.1)
LYMPHOCYTES NFR BLD: 7.5 %
MAGNESIUM SERPL-MCNC: 2.11 MG/DL (ref 1.8–2.4)
MCH RBC QN AUTO: 31.7 PG (ref 26–34)
MCHC RBC AUTO-ENTMCNC: 33.9 G/DL (ref 31–36)
MCV RBC AUTO: 93.6 FL (ref 80–100)
MONOCYTES # BLD: 0.5 K/UL (ref 0–1.3)
MONOCYTES NFR BLD: 10.3 %
NEUTROPHILS # BLD: 3.7 K/UL (ref 1.7–7.7)
NEUTROPHILS NFR BLD: 76.2 %
PLATELET # BLD AUTO: 178 K/UL (ref 135–450)
PMV BLD AUTO: 8.3 FL (ref 5–10.5)
POTASSIUM SERPL-SCNC: 3.1 MMOL/L (ref 3.5–5.1)
POTASSIUM SERPL-SCNC: 4.1 MMOL/L (ref 3.5–5.1)
RBC # BLD AUTO: 2.78 M/UL (ref 4.2–5.9)
SODIUM SERPL-SCNC: 144 MMOL/L (ref 136–145)
WBC # BLD AUTO: 4.8 K/UL (ref 4–11)

## 2024-11-14 PROCEDURE — 6360000002 HC RX W HCPCS: Performed by: NURSE PRACTITIONER

## 2024-11-14 PROCEDURE — 85025 COMPLETE CBC W/AUTO DIFF WBC: CPT

## 2024-11-14 PROCEDURE — 92611 MOTION FLUOROSCOPY/SWALLOW: CPT

## 2024-11-14 PROCEDURE — 80048 BASIC METABOLIC PNL TOTAL CA: CPT

## 2024-11-14 PROCEDURE — 97530 THERAPEUTIC ACTIVITIES: CPT

## 2024-11-14 PROCEDURE — 6370000000 HC RX 637 (ALT 250 FOR IP): Performed by: PHYSICIAN ASSISTANT

## 2024-11-14 PROCEDURE — 84132 ASSAY OF SERUM POTASSIUM: CPT

## 2024-11-14 PROCEDURE — 97116 GAIT TRAINING THERAPY: CPT

## 2024-11-14 PROCEDURE — 36415 COLL VENOUS BLD VENIPUNCTURE: CPT

## 2024-11-14 PROCEDURE — 74230 X-RAY XM SWLNG FUNCJ C+: CPT

## 2024-11-14 PROCEDURE — APPSS15 APP SPLIT SHARED TIME 0-15 MINUTES: Performed by: NURSE PRACTITIONER

## 2024-11-14 PROCEDURE — 6360000002 HC RX W HCPCS: Performed by: INTERNAL MEDICINE

## 2024-11-14 PROCEDURE — 92526 ORAL FUNCTION THERAPY: CPT

## 2024-11-14 PROCEDURE — 83735 ASSAY OF MAGNESIUM: CPT

## 2024-11-14 PROCEDURE — 2580000003 HC RX 258: Performed by: INTERNAL MEDICINE

## 2024-11-14 PROCEDURE — APPNB30 APP NON BILLABLE TIME 0-30 MINS: Performed by: NURSE PRACTITIONER

## 2024-11-14 RX ADMIN — ENOXAPARIN SODIUM 40 MG: 100 INJECTION SUBCUTANEOUS at 08:09

## 2024-11-14 RX ADMIN — Medication 10 ML: at 08:14

## 2024-11-14 RX ADMIN — SODIUM CHLORIDE, PRESERVATIVE FREE 40 MG: 5 INJECTION INTRAVENOUS at 01:34

## 2024-11-14 RX ADMIN — LISINOPRIL 20 MG: 20 TABLET ORAL at 08:11

## 2024-11-14 RX ADMIN — SODIUM CHLORIDE, PRESERVATIVE FREE 40 MG: 5 INJECTION INTRAVENOUS at 13:10

## 2024-11-14 RX ADMIN — VENLAFAXINE HYDROCHLORIDE 150 MG: 150 CAPSULE, EXTENDED RELEASE ORAL at 08:13

## 2024-11-14 RX ADMIN — POTASSIUM BICARBONATE 50 MEQ: 978 TABLET, EFFERVESCENT ORAL at 08:13

## 2024-11-14 ASSESSMENT — PAIN SCALES - WONG BAKER
WONGBAKER_NUMERICALRESPONSE: NO HURT

## 2024-11-14 ASSESSMENT — PAIN SCALES - GENERAL: PAINLEVEL_OUTOF10: 0

## 2024-11-14 NOTE — PROGRESS NOTES
educated on goals, PT role and benefits, plan of care, functional mobility training, proper use of assistive device/equipment, transfer training, discharge recommendations  Learning Assessment:  patient verbalizes and demonstrates understanding    Assessment  Activity Tolerance: Good;  Impairments Requiring Therapeutic Intervention: decreased functional mobility, decreased endurance, increased pain  Prognosis: good  Clinical Assessment: Pt is typically indep at home and no use of device. Pt did not report pain and was Supervision/Independent for all functional mobility. At this time, PT recommends HHPT and initial HHPT. Will continue to monitor progress to provide recommendation for assistive device.  Safety Interventions: patient left in chair, chair alarm in place, call light within reach, nurse notified, family/caregiver present, and friend, Kale, present and daughter came at end of session    Plan  Frequency: 3-5 x/per week  Current Treatment Recommendations: strengthening, balance training, functional mobility training, transfer training, gait training, stair training, endurance training, and equipment evaluation/education    Goals  Patient Goals: To return home   Short Term Goals:  Time Frame: Discharge  Patient will complete bed mobility at [facility]   Patient will complete transfers at Independent   Patient will ambulate 300 ft with use of LRAD at [facility]  Patient will ascend/descend 13 stairs with (L) ascending handrail at supervision  Patient will complete car transfer at Independent  Patient to maintain standing at Independent for 5 minutes.    Above goals reviewed on 11/14/2024.  All goals are ongoing at this time unless indicated above.      Therapy Session Time      Individual Group Co-treatment   Time In 1220       Time Out 1243       Minutes 23         Timed Code Treatment Minutes:  23 Minutes  Total Treatment Minutes:  23  Minutes     Electronically Signed By: Navi Spain PTA,  [id number]    I agree with the above note.  Goals addressed by PT.  Galileo Peres, PT, DPT, ATC-R [id number]

## 2024-11-14 NOTE — PROGRESS NOTES
Discharge instructions given, all questions answered. IV removed with tip intact. Assisted patient to get dressed. Medications reviewed with patient, no further questions.  Marquita Clement RN

## 2024-11-14 NOTE — DISCHARGE INSTRUCTIONS
[facility]    Discharge Instructions      Activity  - activity as tolerated, no driving while on analgesics, and no heavy lifting for 6 weeks; it is OK to be up walking around--walking up and down stairs is also OK. Do what is comfortable: stop and rest if you feel tired.    Diet  - regular diet  - Drink plenty of fluids     Pain  - You may use narcotic pain medication as prescribed for breakthrough pain  - You can use OTC Tylenol or Ibuprofen for 1-2 weeks (may need to adjust the dose as directed on the bottle if enteric coated ibuprofen chosen)  - Avoid taking narcotic pain medication on an empty stomach which may result in nausea, if pain medication is causing nausea try decreasing the dose  - Narcotic pain medication is not necessary, please contact the office if pain is not controlled  - Narcotic pain medication will not be refilled on weekends and after hours  - Please contact the office Monday-Friday 8a-4p if a refill is requested    Nausea  - Avoid taking narcotic pain medication on an empty stomach which may result in nausea  - If pain medication is causing nausea you may try decreasing the dose  - Nausea can be common for 24 hours after surgery--if nausea uncontrolled or worsening, contact the office or go to the ED    Constipation  - Pain medication can be constipating  - Often, it helps to take a stool softener with the goal of at least one soft bowel movement daily with minimal straining  - You may also need to increase water and fiber intake--may supplement with Benefiber and increasing your activity will also be helpful  - If a stool softener is needed, the following OTC medications are recommend: Colace 100 mg by mouth twice daily, Miralax 17 gm by mouth 1-3 times daily with glass of water, dulcolax suppositories, and Fleets enemas    Wound Care  - Wet-to-dry dressings daily and as needed.  - If incisional bleeding is noted, hold firm pressure for 15-20 minutes.  If remains uncontrolled, either contact the office or present to nearest ED  - It is common to have bruising or mild redness around the wounds within the first few days after surgery. If it worsens, or starts draining, do not hesitate to contact the office  - May shower but no tub baths or swimming pools--do not submerge incisions under water    Cautions  - Watch for signs of infection, such as: fever over 100.5, excessive warmth or redness around incisions, bloody or cloudy drainage from incisions  - Watch for signs of complication: uncontrolled pain, nausea, bloating, sudden lightheadedness  - Serious problems can arise after surgery that need urgent or immediate care  - Do not hesitate to contact the office with any questions    Follow-up with your surgeon on Friday 11/22/2024.  Call [phone] to schedule follow-up visit.

## 2024-11-14 NOTE — PROGRESS NOTES
Nutrition Note    RECOMMENDATIONS  PO Diet: Per SLP  ONS: Continue Ensure Plus High Protein TID  Nutrition Support: None      ASSESSMENT   Pt seen for LOS assessment. Pt s/p exp lap, ROSA, and small bowel resection for volvulus on 11/7. Diet advanced from mildly thick full liquids to a dysphagia minced and moist diet on 11/13. Pt with poor PO intake, consuming less than 25% of meals. Pt attributes poor PO intake to disliking the taste of the food. Pt is drinking % of Ensure Plus High Protein. Pt denies any nausea or vomiting with PO intake but states he is \"coughing when he eats which causes pain.\" Pt s/p MBS today, await note from SLP. Encouraged pt not to push himself to eat, but did discuss goal of eating at least 50% of meals by the time he is discharged. Pt agreeable.     Malnutrition Status: Moderate malnutrition  Acute Illness  Findings of the 6 clinical characteristics of malnutrition:  Energy Intake:  50% or less of estimated energy requirements for 5 or more days  Weight Loss:  Unable to assess (+3.4 liters since admission)     Body Fat Loss:  Mild body fat loss Fat Overlying Ribs   Muscle Mass Loss:  Mild muscle mass loss Temples (temporalis)  Fluid Accumulation:  No fluid accumulation     Strength:  Not Performed      NUTRITION DIAGNOSIS   Moderate malnutrition related to inadequate protein-energy intake as evidenced by criteria as identified in malnutrition assessment    Goals: PO intake 50% or greater, prior to discharge     NUTRITION RELATED FINDINGS  Objective: +3.4 liters  Wounds: Surgical Incision    CURRENT NUTRITION THERAPIES  ADULT ORAL NUTRITION SUPPLEMENT; Breakfast, Lunch, Dinner; Standard High Calorie/High Protein Oral Supplement  ADULT DIET; Dysphagia - Minced and Moist       PO Intake: 1-25%   PO Supplement Intake:%    ANTHROPOMETRICS  Current Height: 175.3 cm (5' 9\")  Current Weight - Scale: 59.9 kg (132 lb)    Admission weight: 54.7 kg (120 lb 9.5 oz)  Ideal Body Weight  (IBW): 160 lbs  (73 kg)        BMI: 19.5    COMPARATIVE STANDARDS  Total Energy Requirements (kcals/day): 1698     Protein (g):  66-83 grams       Fluid (mL/day):  1698 mL    EDUCATION  No recommendation at this time     The patient will be monitored per nutrition standards of care. Consult dietitian if additional nutrition interventions are needed prior to RD reassessment.     Nabila Álvarez MS, RD, LD    Contact: [phone]

## 2024-11-14 NOTE — PLAN OF CARE
Problem: Discharge Planning  Goal: Discharge to home or other facility with appropriate resources  Outcome: Adequate for Discharge     Problem: Pain  Goal: Verbalizes/displays adequate comfort level or baseline comfort level  Outcome: Adequate for Discharge     Problem: Safety - Adult  Goal: Free from fall injury  Outcome: Adequate for Discharge     Problem: Skin/Tissue Integrity  Goal: Absence of new skin breakdown  Description: 1.  Monitor for areas of redness and/or skin breakdown  2.  Assess vascular access sites hourly  3.  Every 4-6 hours minimum:  Change oxygen saturation probe site  4.  Every 4-6 hours:  If on nasal continuous positive airway pressure, respiratory therapy assess nares and determine need for appliance change or resting period.  Outcome: Adequate for Discharge     Problem: ABCDS Injury Assessment  Goal: Absence of physical injury  Outcome: Adequate for Discharge     Problem: Metabolic/Fluid and Electrolytes - Adult  Goal: Electrolytes maintained within normal limits  Outcome: Adequate for Discharge     Problem: Metabolic/Fluid and Electrolytes - Adult  Goal: Hemodynamic stability and optimal renal function maintained  Outcome: Adequate for Discharge     Problem: Metabolic/Fluid and Electrolytes - Adult  Goal: Glucose maintained within prescribed range  Outcome: Adequate for Discharge

## 2024-11-14 NOTE — PLAN OF CARE
Problem: Discharge Planning  Goal: Discharge to home or other facility with appropriate resources  11/13/2024 2003 by Antoni Moon RN  Outcome: Progressing  Discharge to home or other facility with appropriate resources:   Identify barriers to discharge with patient and caregiver   Arrange for needed discharge resources and transportation as appropriate   Identify discharge learning needs (meds, wound care, etc)     Problem: Pain  Goal: Verbalizes/displays adequate comfort level or baseline comfort level  11/13/2024 2003 by Antoni Moon RN  Outcome: Progressing  Verbalizes/displays adequate comfort level or baseline comfort level:   Encourage patient to monitor pain and request assistance   Assess pain using appropriate pain scale   Administer analgesics based on type and severity of pain and evaluate response   Implement non-pharmacological measures as appropriate and evaluate response  Taken 11/13/2024 0345 by Roman White RN  Verbalizes/displays adequate comfort level or baseline comfort level: Assess pain using appropriate pain scale  Taken 11/13/2024 0015 by Roman White RN  Verbalizes/displays adequate comfort level or baseline comfort level: Assess pain using appropriate pain scale     Problem: Safety - Adult  Goal: Free from fall injury  11/13/2024 2003 by Antoni Moon RN  Outcome: Progressing  Free From Fall Injury: Instruct family/caregiver on patient safety     Problem: Skin/Tissue Integrity  Goal: Absence of new skin breakdown  Description: 1.  Monitor for areas of redness and/or skin breakdown  2.  Assess vascular access sites hourly  3.  Every 4-6 hours minimum:  Change oxygen saturation probe site  4.  Every 4-6 hours:  If on nasal continuous positive airway pressure, respiratory therapy assess nares and determine need for appliance change or resting period.  Outcome: Progressing     Problem: ABCDS Injury Assessment  Goal: Absence of physical injury  11/13/2024 2003 by Antoni Moon,  RN  Outcome: Progressing  Absence of Physical Injury: Implement safety measures based on patient assessment     Problem: Metabolic/Fluid and Electrolytes - Adult  Goal: Electrolytes maintained within normal limits  11/13/2024 2003 by Antoni Moon, RN  Outcome: Progressing  Electrolytes maintained within normal limits: Monitor labs and assess patient for signs and symptoms of electrolyte imbalances  Goal: Hemodynamic stability and optimal renal function maintained  Outcome: Progressing  Goal: Glucose maintained within prescribed range  Outcome: Progressing

## 2024-11-14 NOTE — PROCEDURES
Facility/Department: [address] ORTHO/NEURO NURSING  MODIFIED BARIUM SWALLOW EVALUATION    Patient: Kevin Blair   : 1935   MRN: [id number]      Evaluation Date: 2024   Admitting Diagnosis: Nausea [R11.0]  SBO (small bowel obstruction) ([facility]) [K56.609]  Upper abdominal pain [R10.10]  Treatment Diagnosis: Dysphagia   Pain:  Denies                           Ordering MD: Dr. Urena   Radiologist: Dr. Yanez   Date of Evaluation: 2024  Type of Study: Modified Barium Swallowing Study (MBS)  Diet Prior to Study:  Dysphagia II Minced and Moist  Thin liquids    Reason for referral/HPI: See initial SLP evaluation.       Impression:  Modified Barium Swallow evaluation completed on 2024. Patient presents with severe oropharyngeal dysphagia secondary to prolonged mastication, prolonged/decreased A-P transit, premature bolus loss, pharyngeal pooling, decreased anterior hyoid movement, absent epiglottic inversion, decreased laryngeal elevation, decreased laryngeal vestibule closure, decreased tongue base retraction, diminished pharyngeal stripping wave, decreased pharynoesophageal segment opening complicated by advanced age, ill fitting dentures resulting in observed laryngeal penetration and eventual aspiration of all liquid textures, significantly impaired pharyngeal clearing with moderate pharyngeal residue and eventual airway invasion of pharyngeal residue. Pt with significantly decreased ability to efficiently move all textures through pharynx, attempting to utilize multiple swallows (average 5-6) to attempt to clear residue. Despite use of multiple swallows moderate vallecular and pharyngeal residue remained. This intermittently resulted in eventual laryngeal penetration/aspiration of pharyngeal residue. Impaired pharyngeal clearing appeared to be due to absent epiglottic inversion, diminished pharyngeal stripping wave, decreased tongue base retraction and decreased pharyngoesophageal  room later this date to discuss with family.     Aspiration/Penetration Risk:  High risk with all textures     Recommendations:    Diet Level:   Pt verbalizes desire to eat/drink with known severe oropharyngeal dysphagia recommend Dysphagia II Minced and Moist with thin liquids with close monitoring and ongoing speech therapy for dysphagia tx.   Medication: Meds crushed as able in puree     Strategies: Upright as possible with all PO intake , Small bites/sips , Eat/feed slowly, Remain upright 30-45 min , Cough/re-swallow , Aspiration Precautions     Treatments: Ongoing dysphagia therapy with SLP   Goals:  Pt will functionally tolerate recommended diet level with use of recommended compensatory strategies with no s/s of aspiration/penetration    Pt/family will demonstrate understanding of results Modified Barium Swallow Study, risk for aspiration, rationale for diet level and compensatory strategies  Pt will tolerate advance to least restrictive diet as evidenced by repeat instrumental swallow study     Consistencies given: thin liquids, mildly (nectar) thick liquids , moderately (honey) thick liquids , puree , and soft solids     Oral Phase  Decreased bolus control   Prolonged/impaired mastication   Premature bolus loss   Impaired A-P bolus transport     Pharyngeal Phase  Pooling to the pyriforms   Delayed swallow onset   Decreased anterior hyoid movement   Absent epiglottic inversion   Reduced tongue base retraction   Significantly decreased laryngeal elevation   Decreased laryngeal vestibule closure   Diminished pharyngeal stripping   Decreased Pharyngoesophageal segment opening   Severe pharyngeal residue       Penetration/Aspiration Scores across consistencies (Luis Fernando et. al. 1996)   CONSISTENCY  Pen/Asp rating Description    Thin   5) Material enters the airway, contacts the vocal folds, and is not ejected from the airway  7) Material enters the airway, passes below the vocal folds, and is not ejected from

## 2024-11-14 NOTE — PROGRESS NOTES
1944: spoken to patient's daughter Anjali Moore and gave her update about the patient.    2000: shift assessment done, VSS, patient sitting comfortably in the recliner, denies any pain at the moment, call light within reach, plan of care ongoing. The care plan and education has been reviewed and mutually agreed upon with the patient.       2230: dressing change done on abdomen; patient tolerated it well.

## 2024-11-14 NOTE — PROGRESS NOTES
Physician Progress Note      PATIENT:               CARLEY LAINEZ  CSN #:                  [id number]  :                       1935  ADMIT DATE:       2024 7:05 PM  DISCH DATE:  RESPONDING  PROVIDER #:        HUNTER VELEZ PA-C          QUERY TEXT:    Pt admitted with SBO and has malnutrition documented in  progress note.    Per  progress note \"Dysphagia-on nectar thick liquids. Patient does not   like this therefore not drinking much. Speech to reassess now that he is on   solid food.\"  Please further specify type of malnutrition with documentation   in the medical record.    The medical record reflects the following:  Risk Factors: SBO, dysphagia  Clinical Indicators: BMI 19.49, NGT for several days followed by liquid diet  Treatment: diet advanced following surgery, speech recommendation for minced   and moist diet    ASPEN Criteria:    https://aspenjournals.onlinelibrary.adair.com/doi/full/10.1177/512500326410744  5  Options provided:  -- Mild Malnutrition  -- Moderate Malnutrition  -- Severe Malnutrition  -- Malnutrition ruled out  -- Other - I will add my own diagnosis  -- Disagree - Not applicable / Not valid  -- Disagree - Clinically unable to determine / Unknown  -- Refer to Clinical Documentation Reviewer    PROVIDER RESPONSE TEXT:    Malnutrition ruled out    Query created by: Emperatriz Robert on 2024 6:08 AM      Electronically signed by:  HUNTER VELEZ PA-C 2024 7:53 AM

## 2024-11-14 NOTE — PROGRESS NOTES
Speech Language Pathology  [facility]  [phone]  SLP Dysphagia Treatment /Swallow Study education       Patient: Kevin Blair   : 1935   MRN: [id number]      Evaluation Date: 2024      Admitting Dx: Nausea [R11.0]  SBO (small bowel obstruction) ([facility]) [K56.609]  Upper abdominal pain [R10.10]  Treatment Diagnosis: Oropharyngeal Dysphagia   Pain: Did not state                                          Impressions/Brief education note:  Modified Barium Swallow completed earlier this date, see procedure note for details. Pt found to have severe oropharyngeal dysphagia, requested SLP complete education with his family in room. Daughter present for education. Modified Barium Swallow video viewed. SLP provided skilled education re; normal swallow vs dysphagia, results of Modified Barium Swallow, aspiration risk factors, risks associated with aspiration, risk for ongoing poor nutrition/hydration. Discussed potential options in the setting of severe oropharyngeal dysphagia I.e, potential alternative means of nutrition (reviewed risks/benefits of feeding tube placement) vs following modified diet with aspiration precautions vs liberalizing diet. Reviewed risks/benefits of each option. Discussed recommendation for ongoing speech therapy for dysphagia tx. At this time pt would like to continue eating modified diet by mouth in conjunction with ongoing speech therapy. He demonstrated understanding of risks associated with eating by mouth. Provided handouts; oral care, minced and moist diet, aspiration vs normal swallow, aspiration PNA, recommended diet/swallow strategies. Discussed strategies to reduced risk of aspiration PNA including mobility as able, frequent oral care, self feeding, aspiration precautions, energy conservation with intake and safe swallow strategies. Reviewed that even with use of strategies, modified diet and dysphagia tx risk factors of

## 2024-11-14 NOTE — PROGRESS NOTES
RN verified with David  that home healthcare is set up. Discharge information provided, patient verbalized his understanding. Additional wound care supplies given to patient, pill  given to patent. Patient discharged to home via private transportation by family. IV removed. All belongings sent with pt.

## 2024-11-14 NOTE — CARE COORDINATION
Case Management -  Discharge Note      Patient Name: Kevin Blair                   YOB: 1935            Readmission Risk (Low < 19, Mod (19-27), High > 27): Readmission Risk Score: 13    Current PCP: Blane Callahan MD      (IMM) Important Message from Medicare:    Has pt received appropriate IMM before discharge if required: yes  Date: 11/14/24    PT AM-PAC: 22 /24  OT AM-PAC: 18 /24    Patient/patient representative has been educated on the benefits of HHC as well as the possible risks of declining recommended services. Patient/patient representative has acknowledged the information provided and decided on the following discharge plan. Patient/ patient representative has been provided freedom of choice regarding service provider, supported by basic dialogue that supports the patient's individualized plan of care/goals.    Home Care Information:   Is patient resuming current home health care services: No    Home Care Agency:   [facility]  [facility] [address],   [place] [address]  Phone: [phone]  Fax: [phone]      Services: PT/OT/Speech  Home Health Order Obtained: Yes    Home health agency notified of discharge.      Financial    Payor: HU[employer] MEDICARE / Plan: HU[employer] CHOICE-PPO MEDICARE / Product Type: *No Product type* /     Pharmacy:  Potential assistance Purchasing Medications: No  Meds-to-Beds request: Yes      [id number] [place] [id number] - P [phone] - F [phone]  [facility] [address]  [facility] [address]  Phone: [phone] Fax: [phone]      Notes:    Additional Case Management Notes: Pt has been informed of discharge service called [facility] that would follow up with pt after discharge in their home.  Pt has declined to have this service at this time because no interest.       Electronically signed by RUY Vogel on 11/14/24 at 2:06 PM EST

## 2024-11-14 NOTE — PROGRESS NOTES
[facility]        Progress Note    Patient Name: Kevin Blair  MRN: [id number]  YOB: 1935  Date of Evaluation: 2024    Subjective:  No acute events overnight  Pain controlled without narcotics  No nausea or vomiting, tolerating diet--swallow evaluation this morning  Passing stool  Up to chair at this time    Post-Operative Day #7      Vital Signs:  Patient Vitals for the past 24 hrs:   BP Temp Temp src Pulse Resp SpO2 Height   24 1331 -- -- -- -- -- -- 1.753 m (5' 9\")   24 0800 123/65 97.7 °F (36.5 °C) Oral 75 17 95 % --   24 0615 (!) 148/75 98.1 °F (36.7 °C) Oral 95 18 95 % --   24 0058 (!) 154/79 98.2 °F (36.8 °C) Oral 92 16 94 % --   24 2000 119/68 97.6 °F (36.4 °C) Oral 85 16 98 % --   24 1715 (!) 149/79 97.4 °F (36.3 °C) Oral -- 16 95 % --      TEMPERATURE HISTORY 24H: Temp (24hrs), Av.8 °F (36.6 °C), Min:97.4 °F (36.3 °C), Max:98.2 °F (36.8 °C)    BLOOD PRESSURE HISTORY: Systolic (36hrs), Av , Min:119 , Max:154    Diastolic (36hrs), Av, Min:65, Max:79      Intake/Output:  I/O last 3 completed shifts:  In: 460 [P.O.:460]  Out: -   I/O this shift:  In: 240 [P.O.:240]  Out: -   Drain/tube Output:       Physical Exam:  General: awake, alert, no acute distress  Lungs: unlabored respirations  Abdomen: soft, non-distended, incisional tenderness only, bowel sounds present  Skin/Wound: open midline abdominal wound bed with small fibrinous exudate, fascia is intact--dressing changed    Labs:  CBC:    Recent Labs     24  0559 24  0558 24  0540   WBC 5.9 5.3 4.8   HGB 10.1* 9.6* 8.8*   HCT 30.1* 28.6* 26.1*    179 178     BMP:    Recent Labs     24  0559 24  1551 24  0558 24  0540 24  1211     --  147* 144  --    K 2.6*   < > 3.2* 3.1* 4.1     --  108 106  --    CO2 29  --  30 31  --    BUN 22*  --  19 17  --    CREATININE 0.8  --  0.8 0.8   fluoroscopic evaluation cineradiography/videoradiography recordings were performed in conjunction with the speech-language pathologist (SLP). Various liquid, solid and/or semi-solid barium preparations were used to assess swallowing function. FLUOROSCOPY DOSE AND TYPE: Radiation Exposure Index: Kerma mGy, 8.70 COMPARISON: None HISTORY: ORDERING SYSTEM PROVIDED HISTORY: Dysphagia TECHNOLOGIST PROVIDED HISTORY: Reason for exam:->Dysphagia FINDINGS: Oral phase of swallowing was grossly within normal limits.  In the pharyngeal phase, there is consistent large residue within the vallecula and in the piriform recesses bilaterally. Consistent deep laryngeal penetration to the cords with multiple episodes of subglottic tracheal aspiration throughout the procedure.  This was most notable with thin barium.  However, additional episodes of aspiration were observed with nectar, honey and thicker barium substances.  It is very difficult to evaluate direct aspiration due to the significant residue throughout.  The patient had difficulty clearing residue with additional swallows and head turn maneuvers.     1. Significant residue in the pharyngeal phase of swallowing with all barium substances. 2. Consistent aspiration with thin barium.  Additional episodes of aspiration or witnessed with swallows of thicker substances, although it is difficult to determine if this is charla aspiration or aspiration of prior residue. Please see separate speech pathology report for full discussion of findings and recommendations.      Scheduled Meds:   potassium bicarbonate  50 mEq Oral BID    venlafaxine  150 mg Oral Daily with breakfast    lisinopril  20 mg Oral Daily    sodium chloride flush  10 mL IntraVENous 2 times per day    pantoprazole (PROTONIX) 40 mg in sodium chloride (PF) 0.9 % 10 mL injection  40 mg IntraVENous Q12H    enoxaparin  40 mg SubCUTAneous Daily     Continuous Infusions:   sodium chloride       PRN Meds:.HYDROcodone 5 mg -

## 2024-11-14 NOTE — CARE COORDINATION
11/14/24 1401   IMM Letter   IMM Letter given to Patient/Family/Significant other/Guardian/POA/by: patient signed   IMM Letter date given: 11/14/24   IMM Letter time given: 1338     Electronically signed by RUY Vogel on 11/14/24 at 2:01 PM EST

## 2024-11-14 NOTE — PROGRESS NOTES
Shift assessment completed and charted. VSS. A/o x4. Standard safety measures in place. Bed locked and in lowest position, call light within reach. SpO2 > 90% on room air. Patient up to chair for meals. Denies nausea, vomiting, or pain. Modified barium swallow completed, results pending. Pt stable and denied needs when writer left room.    12:53 PM  Potassium lab rechecked after receiving oral replacement, K is 4.1. RN spoke with Vonda with speech therapy, she will come talk to patient to explain test results. Patient stable, patient and family denied needs when writer left room.

## 2024-11-14 NOTE — PLAN OF CARE
Problem: Discharge Planning  Goal: Discharge to home or other facility with appropriate resources  11/14/2024 1146 by Anayeli Sena, RN  Outcome: Adequate for Discharge     Problem: Pain  Goal: Verbalizes/displays adequate comfort level or baseline comfort level  11/14/2024 1146 by Anayeli Sena, RN  Outcome: Adequate for Discharge

## 2024-11-17 NOTE — DISCHARGE SUMMARY
Hospital Medicine Discharge Summary    Patient: Kevin Blair     Gender: male  : 1935   Age: 89 y.o.  MRN: [id number]    Admitting Physician: Elisa Krishnan DO  Discharge Physician: Eve Santos PA-C     Code Status: Full code    Admit Date: 2024   Discharge Date: 2024      Disposition:  Home  Time spent arranging discharge: 40 minutes    Discharge Diagnoses:    Active Hospital Problems    Diagnosis Date Noted    Moderate malnutrition (HCC) [E44.0] 2024    SBO (small bowel obstruction) ([facility]) [K56.609] 2024       Recommendations: Follow up with PCP and Dr Chowdhury in one week,     Condition at Discharge:  Stable    Hospital Course:   Patient is a pleasant 89-year-old male who presented to the hospital with abdominal pain and distention.  Associated nausea and vomiting.  CT in the emergency room revealed a small bowel obstruction with twisting the mesentery concerning for closed-loop obstruction with vascular compromise.    SBO: Patient was admitted and urgently seen by general surgery and taken to the operating room on  by Dr. Sheridan for exploratory laparotomy with lysis of adhesions and small bowel resection.  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  75 cm of nonviable small bowel were resected. Patient had an NG tube postoperatively.  Once he regained bowel function, he was started on clear liquid diet which was subsequently advanced.  He was moving his bowels and tolerating a dysphagia diet at time of DC.  He does have a healing wound which will be dressed by home care and the patient upon discharge.  HTN: continue lisinpril. Hold chlorthalidone for now.   Dysphagia-patient was seen by speech therapy.  He had a modified barium swallow which revealed severe oropharyngeal dysphagia.  He will continue dysphagia to diet minced and moist with thin light quids however he and his family were instructed

## 2024-11-20 NOTE — PROGRESS NOTES
Physician Progress Note      PATIENT:               CARLEY LAINEZ  CSN #:                  [id number]  :                       1935  ADMIT DATE:       2024 7:05 PM  DISCH DATE:        2024 3:56 PM  RESPONDING  PROVIDER #:        Nghia Sheridan MD          QUERY TEXT:    Patient admitted with SBO with volvulus.  Per  op note \"At this point in   time, we noticed that the bowel was torsed near the base of the mesentery.  It   was untorsed approximately 360 degrees.  The bowel was dark purple and   ischemic appearing as was the mesentery.  The bowel did pink in up to some   degree.  Overall impression was that it was too damaged to leave in place.\"    Please document in progress notes and discharge summary if you are evaluating   and/or treating any of the following:    The medical record reflects the following:  Risk Factors: SBO with volvulus  Clinical Indicators: segment of bowel was torsed, dark purple, ischemic   appearing, too damaged to leave in place  Treatment: small bowel resection  Options provided:  -- Acute focal ischemia of small intestine  -- Other - I will add my own diagnosis  -- Disagree - Not applicable / Not valid  -- Disagree - Clinically unable to determine / Unknown  -- Refer to Clinical Documentation Reviewer    PROVIDER RESPONSE TEXT:    This patient has acute focal ischemia of small intestine.    Query created by: Emperatriz Robert on 11/15/2024 6:45 AM      Electronically signed by:  Nghia Sheridan MD 2024 6:30 PM

## 2024-11-21 ENCOUNTER — TELEPHONE (OUTPATIENT)
Dept: SURGERY | Age: 89
End: 2024-11-21

## 2024-11-21 NOTE — TELEPHONE ENCOUNTER
Rn from [facility]. The wound has not made any progress 11 cm by 3.5 by 1.5 depth. 11/07/24  LAPAROTOMY EXPLORATORY, LYSIS OF ADHESIONS, SMALL BOWEL RESECTION    Please Advise  Alida Dahl   [phone]

## 2024-11-22 ENCOUNTER — APPOINTMENT (OUTPATIENT)
Dept: CT IMAGING | Age: 89
DRG: 329 | End: 2024-11-22
Payer: MEDICARE

## 2024-11-22 ENCOUNTER — TELEPHONE (OUTPATIENT)
Dept: SURGERY | Age: 89
End: 2024-11-22

## 2024-11-22 ENCOUNTER — OFFICE VISIT (OUTPATIENT)
Dept: SURGERY | Age: 89
End: 2024-11-22

## 2024-11-22 ENCOUNTER — HOSPITAL ENCOUNTER (INPATIENT)
Age: 89
LOS: 18 days | Discharge: NURSING, REHAB OR LONG-TERM CARE | DRG: 329 | End: 2024-12-10
Attending: EMERGENCY MEDICINE | Admitting: FAMILY MEDICINE
Payer: MEDICARE

## 2024-11-22 VITALS — DIASTOLIC BLOOD PRESSURE: 68 MMHG | WEIGHT: 118 LBS | SYSTOLIC BLOOD PRESSURE: 116 MMHG | BODY MASS INDEX: 17.43 KG/M2

## 2024-11-22 DIAGNOSIS — Z71.89 GOALS OF CARE, COUNSELING/DISCUSSION: ICD-10-CM

## 2024-11-22 DIAGNOSIS — K56.609 SBO (SMALL BOWEL OBSTRUCTION) (HCC): Primary | ICD-10-CM

## 2024-11-22 DIAGNOSIS — L76.82 POSTOPERATIVE SURGICAL COMPLICATION INVOLVING SKIN ASSOCIATED WITH NON-DERMATOLOGIC PROCEDURE, UNSPECIFIED COMPLICATION: Primary | ICD-10-CM

## 2024-11-22 DIAGNOSIS — R19.5 DARK STOOLS: ICD-10-CM

## 2024-11-22 DIAGNOSIS — R63.8 INADEQUATE ORAL INTAKE: ICD-10-CM

## 2024-11-22 DIAGNOSIS — K56.609 SMALL BOWEL OBSTRUCTION (HCC): ICD-10-CM

## 2024-11-22 PROBLEM — S31.109A OPEN ABDOMINAL WALL WOUND, INITIAL ENCOUNTER: Status: ACTIVE | Noted: 2024-11-22

## 2024-11-22 LAB
ALBUMIN SERPL-MCNC: 3.2 G/DL (ref 3.4–5)
ALBUMIN/GLOB SERPL: 0.9 {RATIO} (ref 1.1–2.2)
ALP SERPL-CCNC: 84 U/L (ref 40–129)
ALT SERPL-CCNC: 18 U/L (ref 10–40)
ANION GAP SERPL CALCULATED.3IONS-SCNC: 8 MMOL/L (ref 3–16)
AST SERPL-CCNC: 18 U/L (ref 15–37)
BASOPHILS # BLD: 0 K/UL (ref 0–0.2)
BASOPHILS NFR BLD: 0.5 %
BILIRUB SERPL-MCNC: 0.3 MG/DL (ref 0–1)
BUN SERPL-MCNC: 15 MG/DL (ref 7–20)
CALCIUM SERPL-MCNC: 10.6 MG/DL (ref 8.3–10.6)
CHLORIDE SERPL-SCNC: 105 MMOL/L (ref 99–110)
CO2 SERPL-SCNC: 30 MMOL/L (ref 21–32)
CREAT SERPL-MCNC: 0.9 MG/DL (ref 0.8–1.3)
DEPRECATED RDW RBC AUTO: 13.6 % (ref 12.4–15.4)
EOSINOPHIL # BLD: 0.1 K/UL (ref 0–0.6)
EOSINOPHIL NFR BLD: 1.1 %
GFR SERPLBLD CREATININE-BSD FMLA CKD-EPI: 82 ML/MIN/{1.73_M2}
GLUCOSE SERPL-MCNC: 111 MG/DL (ref 70–99)
HCT VFR BLD AUTO: 28.5 % (ref 40.5–52.5)
HGB BLD-MCNC: 9.7 G/DL (ref 13.5–17.5)
LYMPHOCYTES # BLD: 0.4 K/UL (ref 1–5.1)
LYMPHOCYTES NFR BLD: 4.8 %
MAGNESIUM SERPL-MCNC: 2.48 MG/DL (ref 1.8–2.4)
MCH RBC QN AUTO: 32.8 PG (ref 26–34)
MCHC RBC AUTO-ENTMCNC: 34.1 G/DL (ref 31–36)
MCV RBC AUTO: 96 FL (ref 80–100)
MONOCYTES # BLD: 0.5 K/UL (ref 0–1.3)
MONOCYTES NFR BLD: 5.6 %
NEUTROPHILS # BLD: 8.1 K/UL (ref 1.7–7.7)
NEUTROPHILS NFR BLD: 88 %
PLATELET # BLD AUTO: 594 K/UL (ref 135–450)
PMV BLD AUTO: 7.6 FL (ref 5–10.5)
POTASSIUM SERPL-SCNC: 4.2 MMOL/L (ref 3.5–5.1)
PROT SERPL-MCNC: 6.9 G/DL (ref 6.4–8.2)
RBC # BLD AUTO: 2.97 M/UL (ref 4.2–5.9)
SODIUM SERPL-SCNC: 143 MMOL/L (ref 136–145)
WBC # BLD AUTO: 9.2 K/UL (ref 4–11)

## 2024-11-22 PROCEDURE — 6360000004 HC RX CONTRAST MEDICATION: Performed by: EMERGENCY MEDICINE

## 2024-11-22 PROCEDURE — 74177 CT ABD & PELVIS W/CONTRAST: CPT

## 2024-11-22 PROCEDURE — 2580000003 HC RX 258: Performed by: NURSE PRACTITIONER

## 2024-11-22 PROCEDURE — 6360000002 HC RX W HCPCS: Performed by: NURSE PRACTITIONER

## 2024-11-22 PROCEDURE — 99285 EMERGENCY DEPT VISIT HI MDM: CPT

## 2024-11-22 PROCEDURE — 1200000000 HC SEMI PRIVATE

## 2024-11-22 PROCEDURE — 80053 COMPREHEN METABOLIC PANEL: CPT

## 2024-11-22 PROCEDURE — 85025 COMPLETE CBC W/AUTO DIFF WBC: CPT

## 2024-11-22 PROCEDURE — 83735 ASSAY OF MAGNESIUM: CPT

## 2024-11-22 PROCEDURE — 6370000000 HC RX 637 (ALT 250 FOR IP): Performed by: NURSE PRACTITIONER

## 2024-11-22 RX ORDER — MAGNESIUM SULFATE IN WATER 40 MG/ML
2000 INJECTION, SOLUTION INTRAVENOUS PRN
Status: DISCONTINUED | OUTPATIENT
Start: 2024-11-22 | End: 2024-12-10 | Stop reason: HOSPADM

## 2024-11-22 RX ORDER — POLYETHYLENE GLYCOL 3350 17 G/17G
17 POWDER, FOR SOLUTION ORAL DAILY PRN
Status: DISCONTINUED | OUTPATIENT
Start: 2024-11-22 | End: 2024-12-10 | Stop reason: HOSPADM

## 2024-11-22 RX ORDER — ATORVASTATIN CALCIUM 20 MG/1
20 TABLET, FILM COATED ORAL EVERY EVENING
Status: DISCONTINUED | OUTPATIENT
Start: 2024-11-22 | End: 2024-11-25

## 2024-11-22 RX ORDER — SODIUM CHLORIDE 9 MG/ML
INJECTION, SOLUTION INTRAVENOUS CONTINUOUS
Status: ACTIVE | OUTPATIENT
Start: 2024-11-22 | End: 2024-11-23

## 2024-11-22 RX ORDER — SODIUM CHLORIDE 0.9 % (FLUSH) 0.9 %
5-40 SYRINGE (ML) INJECTION PRN
Status: DISCONTINUED | OUTPATIENT
Start: 2024-11-22 | End: 2024-12-10 | Stop reason: HOSPADM

## 2024-11-22 RX ORDER — ACETAMINOPHEN 650 MG/1
650 SUPPOSITORY RECTAL EVERY 6 HOURS PRN
Status: DISCONTINUED | OUTPATIENT
Start: 2024-11-22 | End: 2024-12-10 | Stop reason: HOSPADM

## 2024-11-22 RX ORDER — VENLAFAXINE HYDROCHLORIDE 150 MG/1
150 CAPSULE, EXTENDED RELEASE ORAL DAILY
Status: DISCONTINUED | OUTPATIENT
Start: 2024-11-23 | End: 2024-12-10 | Stop reason: HOSPADM

## 2024-11-22 RX ORDER — SODIUM CHLORIDE 9 MG/ML
INJECTION, SOLUTION INTRAVENOUS PRN
Status: DISCONTINUED | OUTPATIENT
Start: 2024-11-22 | End: 2024-11-27

## 2024-11-22 RX ORDER — ENOXAPARIN SODIUM 100 MG/ML
40 INJECTION SUBCUTANEOUS DAILY
Status: DISCONTINUED | OUTPATIENT
Start: 2024-11-23 | End: 2024-11-25

## 2024-11-22 RX ORDER — ACETAMINOPHEN 325 MG/1
650 TABLET ORAL EVERY 6 HOURS PRN
Status: DISCONTINUED | OUTPATIENT
Start: 2024-11-22 | End: 2024-12-10 | Stop reason: HOSPADM

## 2024-11-22 RX ORDER — IOPAMIDOL 755 MG/ML
75 INJECTION, SOLUTION INTRAVASCULAR
Status: COMPLETED | OUTPATIENT
Start: 2024-11-22 | End: 2024-11-22

## 2024-11-22 RX ORDER — LISINOPRIL 20 MG/1
20 TABLET ORAL DAILY
Status: DISCONTINUED | OUTPATIENT
Start: 2024-11-23 | End: 2024-12-10 | Stop reason: HOSPADM

## 2024-11-22 RX ORDER — ONDANSETRON 2 MG/ML
4 INJECTION INTRAMUSCULAR; INTRAVENOUS EVERY 6 HOURS PRN
Status: DISCONTINUED | OUTPATIENT
Start: 2024-11-22 | End: 2024-12-10 | Stop reason: HOSPADM

## 2024-11-22 RX ORDER — POTASSIUM CHLORIDE 1500 MG/1
40 TABLET, EXTENDED RELEASE ORAL PRN
Status: DISCONTINUED | OUTPATIENT
Start: 2024-11-22 | End: 2024-11-28

## 2024-11-22 RX ORDER — ONDANSETRON 4 MG/1
4 TABLET, ORALLY DISINTEGRATING ORAL EVERY 8 HOURS PRN
Status: DISCONTINUED | OUTPATIENT
Start: 2024-11-22 | End: 2024-12-10 | Stop reason: HOSPADM

## 2024-11-22 RX ORDER — POTASSIUM CHLORIDE 7.45 MG/ML
10 INJECTION INTRAVENOUS PRN
Status: DISCONTINUED | OUTPATIENT
Start: 2024-11-22 | End: 2024-11-28

## 2024-11-22 RX ORDER — SODIUM CHLORIDE 0.9 % (FLUSH) 0.9 %
5-40 SYRINGE (ML) INJECTION EVERY 12 HOURS SCHEDULED
Status: DISCONTINUED | OUTPATIENT
Start: 2024-11-22 | End: 2024-12-10 | Stop reason: HOSPADM

## 2024-11-22 RX ADMIN — ATORVASTATIN CALCIUM 20 MG: 20 TABLET, FILM COATED ORAL at 23:32

## 2024-11-22 RX ADMIN — IOPAMIDOL 75 ML: 755 INJECTION, SOLUTION INTRAVENOUS at 19:53

## 2024-11-22 RX ADMIN — PIPERACILLIN SODIUM AND TAZOBACTAM SODIUM 4500 MG: 4; .5 INJECTION, SOLUTION INTRAVENOUS at 23:36

## 2024-11-22 RX ADMIN — MELATONIN TAB 3 MG 3 MG: 3 TAB at 23:32

## 2024-11-22 RX ADMIN — SODIUM CHLORIDE, PRESERVATIVE FREE 10 ML: 5 INJECTION INTRAVENOUS at 23:40

## 2024-11-22 RX ADMIN — SODIUM CHLORIDE: 9 INJECTION, SOLUTION INTRAVENOUS at 23:05

## 2024-11-22 ASSESSMENT — LIFESTYLE VARIABLES
HOW MANY STANDARD DRINKS CONTAINING ALCOHOL DO YOU HAVE ON A TYPICAL DAY: PATIENT DOES NOT DRINK
HOW OFTEN DO YOU HAVE A DRINK CONTAINING ALCOHOL: NEVER

## 2024-11-22 ASSESSMENT — PAIN - FUNCTIONAL ASSESSMENT: PAIN_FUNCTIONAL_ASSESSMENT: NONE - DENIES PAIN

## 2024-11-22 NOTE — PROGRESS NOTES
Subjective   Patient ID: Kevin Blair is a 89 y.o. male.    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  His wound was intentionally left open.  We were called by the home care with concerns regarding the wound.  The fascia appears to have .  Loose PDS sutures were removed and then the base of the wound was gently debrided.  No signs of evisceration.  P.o. intake has been fairly poor.          Plan   Will have wound VAC placed.  Wrote for Adaptic over wound base followed by black foam at 100 mmHg pressure.  Recommended that he continues dietary supplements.  The patient will follow-up in approximately 1 week.        Nghia Sheridan MD

## 2024-11-22 NOTE — TELEPHONE ENCOUNTER
Nabila states patient is calling her saying he is having a lot of \"orange\" colored drainage for the abdominal wound. Has changed dressing twice since his office visit this afternoon. Nabila states she has advised patient to go to ED.

## 2024-11-23 LAB
ALBUMIN SERPL-MCNC: 3 G/DL (ref 3.4–5)
ALBUMIN/GLOB SERPL: 0.9 {RATIO} (ref 1.1–2.2)
ALP SERPL-CCNC: 74 U/L (ref 40–129)
ALT SERPL-CCNC: 13 U/L (ref 10–40)
ANION GAP SERPL CALCULATED.3IONS-SCNC: 6 MMOL/L (ref 3–16)
AST SERPL-CCNC: 18 U/L (ref 15–37)
BACTERIA URNS QL MICRO: NORMAL /HPF
BASOPHILS # BLD: 0 K/UL (ref 0–0.2)
BASOPHILS NFR BLD: 0.8 %
BILIRUB SERPL-MCNC: 0.4 MG/DL (ref 0–1)
BILIRUB UR QL STRIP.AUTO: NEGATIVE
BUN SERPL-MCNC: 13 MG/DL (ref 7–20)
CALCIUM SERPL-MCNC: 10.4 MG/DL (ref 8.3–10.6)
CHLORIDE SERPL-SCNC: 105 MMOL/L (ref 99–110)
CLARITY UR: ABNORMAL
CO2 SERPL-SCNC: 30 MMOL/L (ref 21–32)
COLOR UR: YELLOW
CREAT SERPL-MCNC: 1 MG/DL (ref 0.8–1.3)
CRP SERPL-MCNC: 12 MG/L (ref 0–5.1)
DEPRECATED RDW RBC AUTO: 13.4 % (ref 12.4–15.4)
EOSINOPHIL # BLD: 0.1 K/UL (ref 0–0.6)
EOSINOPHIL NFR BLD: 2 %
EPI CELLS #/AREA URNS AUTO: 0 /HPF (ref 0–5)
ERYTHROCYTE [SEDIMENTATION RATE] IN BLOOD BY WESTERGREN METHOD: 57 MM/HR (ref 0–20)
GFR SERPLBLD CREATININE-BSD FMLA CKD-EPI: 72 ML/MIN/{1.73_M2}
GLUCOSE SERPL-MCNC: 106 MG/DL (ref 70–99)
GLUCOSE UR STRIP.AUTO-MCNC: NEGATIVE MG/DL
HCT VFR BLD AUTO: 26.8 % (ref 40.5–52.5)
HGB BLD-MCNC: 8.9 G/DL (ref 13.5–17.5)
HGB UR QL STRIP.AUTO: NEGATIVE
HYALINE CASTS #/AREA URNS AUTO: 0 /LPF (ref 0–8)
KETONES UR STRIP.AUTO-MCNC: NEGATIVE MG/DL
LEUKOCYTE ESTERASE UR QL STRIP.AUTO: NEGATIVE
LYMPHOCYTES # BLD: 0.6 K/UL (ref 1–5.1)
LYMPHOCYTES NFR BLD: 9.5 %
MCH RBC QN AUTO: 31.4 PG (ref 26–34)
MCHC RBC AUTO-ENTMCNC: 33.1 G/DL (ref 31–36)
MCV RBC AUTO: 94.9 FL (ref 80–100)
MONOCYTES # BLD: 0.5 K/UL (ref 0–1.3)
MONOCYTES NFR BLD: 8.6 %
NEUTROPHILS # BLD: 4.8 K/UL (ref 1.7–7.7)
NEUTROPHILS NFR BLD: 79.1 %
NITRITE UR QL STRIP.AUTO: NEGATIVE
PH UR STRIP.AUTO: 8 [PH] (ref 5–8)
PLATELET # BLD AUTO: 500 K/UL (ref 135–450)
PMV BLD AUTO: 7.6 FL (ref 5–10.5)
POTASSIUM SERPL-SCNC: 4 MMOL/L (ref 3.5–5.1)
PROCALCITONIN SERPL IA-MCNC: 0.08 NG/ML (ref 0–0.15)
PROT SERPL-MCNC: 6.5 G/DL (ref 6.4–8.2)
PROT UR STRIP.AUTO-MCNC: ABNORMAL MG/DL
RBC # BLD AUTO: 2.82 M/UL (ref 4.2–5.9)
RBC CLUMPS #/AREA URNS AUTO: 1 /HPF (ref 0–4)
SODIUM SERPL-SCNC: 141 MMOL/L (ref 136–145)
SP GR UR STRIP.AUTO: >=1.03 (ref 1–1.03)
UA COMPLETE W REFLEX CULTURE PNL UR: ABNORMAL
UA DIPSTICK W REFLEX MICRO PNL UR: YES
URN SPEC COLLECT METH UR: ABNORMAL
UROBILINOGEN UR STRIP-ACNC: 1 E.U./DL
WBC # BLD AUTO: 6 K/UL (ref 4–11)
WBC #/AREA URNS AUTO: 1 /HPF (ref 0–5)

## 2024-11-23 PROCEDURE — 6360000002 HC RX W HCPCS: Performed by: NURSE PRACTITIONER

## 2024-11-23 PROCEDURE — 2580000003 HC RX 258: Performed by: INTERNAL MEDICINE

## 2024-11-23 PROCEDURE — C1751 CATH, INF, PER/CENT/MIDLINE: HCPCS

## 2024-11-23 PROCEDURE — 2580000003 HC RX 258: Performed by: NURSE PRACTITIONER

## 2024-11-23 PROCEDURE — 86140 C-REACTIVE PROTEIN: CPT

## 2024-11-23 PROCEDURE — 36569 INSJ PICC 5 YR+ W/O IMAGING: CPT

## 2024-11-23 PROCEDURE — 36415 COLL VENOUS BLD VENIPUNCTURE: CPT

## 2024-11-23 PROCEDURE — 76937 US GUIDE VASCULAR ACCESS: CPT

## 2024-11-23 PROCEDURE — 81001 URINALYSIS AUTO W/SCOPE: CPT

## 2024-11-23 PROCEDURE — 85025 COMPLETE CBC W/AUTO DIFF WBC: CPT

## 2024-11-23 PROCEDURE — 6370000000 HC RX 637 (ALT 250 FOR IP): Performed by: NURSE PRACTITIONER

## 2024-11-23 PROCEDURE — 80053 COMPREHEN METABOLIC PANEL: CPT

## 2024-11-23 PROCEDURE — 1200000000 HC SEMI PRIVATE

## 2024-11-23 PROCEDURE — 02HV33Z INSERTION OF INFUSION DEVICE INTO SUPERIOR VENA CAVA, PERCUTANEOUS APPROACH: ICD-10-PCS | Performed by: STUDENT IN AN ORGANIZED HEALTH CARE EDUCATION/TRAINING PROGRAM

## 2024-11-23 PROCEDURE — 85652 RBC SED RATE AUTOMATED: CPT

## 2024-11-23 PROCEDURE — 99024 POSTOP FOLLOW-UP VISIT: CPT | Performed by: SURGERY

## 2024-11-23 PROCEDURE — 84145 PROCALCITONIN (PCT): CPT

## 2024-11-23 RX ORDER — SODIUM CHLORIDE 9 MG/ML
INJECTION, SOLUTION INTRAVENOUS PRN
Status: DISCONTINUED | OUTPATIENT
Start: 2024-11-23 | End: 2024-11-27

## 2024-11-23 RX ORDER — SODIUM CHLORIDE 0.9 % (FLUSH) 0.9 %
5-40 SYRINGE (ML) INJECTION PRN
Status: DISCONTINUED | OUTPATIENT
Start: 2024-11-23 | End: 2024-12-10 | Stop reason: HOSPADM

## 2024-11-23 RX ORDER — SODIUM CHLORIDE 0.9 % (FLUSH) 0.9 %
5-40 SYRINGE (ML) INJECTION EVERY 12 HOURS SCHEDULED
Status: DISCONTINUED | OUTPATIENT
Start: 2024-11-23 | End: 2024-12-10 | Stop reason: HOSPADM

## 2024-11-23 RX ORDER — SODIUM CHLORIDE 9 MG/ML
INJECTION, SOLUTION INTRAVENOUS PRN
Status: DISCONTINUED | OUTPATIENT
Start: 2024-11-23 | End: 2024-12-10 | Stop reason: HOSPADM

## 2024-11-23 RX ADMIN — MELATONIN TAB 3 MG 3 MG: 3 TAB at 21:42

## 2024-11-23 RX ADMIN — PIPERACILLIN SODIUM AND TAZOBACTAM SODIUM 3375 MG: 3; .375 INJECTION, SOLUTION INTRAVENOUS at 21:42

## 2024-11-23 RX ADMIN — PIPERACILLIN SODIUM AND TAZOBACTAM SODIUM 3375 MG: 3; .375 INJECTION, SOLUTION INTRAVENOUS at 12:47

## 2024-11-23 RX ADMIN — ENOXAPARIN SODIUM 40 MG: 100 INJECTION SUBCUTANEOUS at 08:39

## 2024-11-23 RX ADMIN — Medication 10 ML: at 21:00

## 2024-11-23 RX ADMIN — PIPERACILLIN SODIUM AND TAZOBACTAM SODIUM 3375 MG: 3; .375 INJECTION, SOLUTION INTRAVENOUS at 05:10

## 2024-11-23 RX ADMIN — SODIUM CHLORIDE, PRESERVATIVE FREE 10 ML: 5 INJECTION INTRAVENOUS at 21:42

## 2024-11-23 NOTE — ED NOTES
ED TO INPATIENT SBAR HANDOFF    Patient Name: Kevin Blair   :  1935  89 y.o.   MRN:  [id number]  Preferred Name  Kevin  ED Room #:  ED-0008/08  Family/Caregiver Present yes   Restraints no   Sitter no   Sepsis Risk Score      Situation  Code Status: Prior No additional code details.    Allergies: Patient has no known allergies.  Weight: Patient Vitals for the past 96 hrs (Last 3 readings):   Weight   24 1754 52.2 kg (115 lb)     Arrived from: home  Chief Complaint:   Chief Complaint   Patient presents with    Post-op Problem     Bowel surgery (removed 60 cm small bowel on 24), patient states wound has discharge that is requiring multiple dressing changes per day. Denies pain, denies fever, denies n/v/d.     Hospital Problem/Diagnosis:  Principal Problem:    Open abdominal wall wound, initial encounter  Resolved Problems:    * No resolved hospital problems. *    Imaging:   CT ABDOMEN PELVIS W IV CONTRAST Additional Contrast? None   Final Result   1.  Postoperative changes of laparotomy and partial small-bowel resection.   No bowel dilatation or drainable fluid collection identified.      2.  Extensive colonic diverticulosis.      3.  Possible right inguinal testicle.      4.  Additional findings, as above.           Abnormal labs:   Abnormal Labs Reviewed   CBC WITH AUTO DIFFERENTIAL - Abnormal; Notable for the following components:       Result Value    RBC 2.97 (*)     Hemoglobin 9.7 (*)     Hematocrit 28.5 (*)     Platelets 594 (*)     Neutrophils Absolute 8.1 (*)     Lymphocytes Absolute 0.4 (*)     All other components within normal limits   COMPREHENSIVE METABOLIC PANEL W/ REFLEX TO MG FOR LOW K - Abnormal; Notable for the following components:    Glucose 111 (*)     Albumin 3.2 (*)     Albumin/Globulin Ratio 0.9 (*)     All other components within normal limits   MAGNESIUM - Abnormal; Notable for the following components:    Magnesium 2.48 (*)     All other components within normal

## 2024-11-23 NOTE — H&P
V2.0  History and Physical      Name:  Kevin Blair /Age/Sex: 1935  (89 y.o. male)   MRN & CSN:  [id number] & [id number] Encounter Date/Time: 2024 10:17 PM EST   Location:  [facility] PCP: Blane Callahan MD       Hospital Day: 1    Assessment and Plan:   Kevin Blair is a 89 y.o. male who presents with Open abdominal wall wound, initial encounter    Hospital Problems             Last Modified POA    * (Principal) Open abdominal wall wound, initial encounter 2024 Yes       Plan:  Open Abdominal Wall Wound with Copious Green Drainage   Admit inpatient  Surgery consulted by ER, will see in am  Continue dressings per surgery recommendations  IV zosyn   CBC, CMP in am  NPO  IV fluids  He denies any pain     S/P Exploratory Laparotomy with small resection for midgut Day 15    3.  Unintentional weight Loss  Dietary consult once able to eat    4. Dysphagia  Restart modified diet once able to eat    5. Hypertension  BP stable continue home meds    Disposition:   Current Living situation: home  Expected Disposition: home  Estimated D/C: 3    Diet Diet NPO   DVT Prophylaxis [x] Lovenox, []  Heparin, [] SCDs, [] Ambulation,  [] Eliquis, [] Xarelto, [] Coumadin   Code Status Prior   Surrogate Decision Maker/ POA      Personally reviewed Lab Studies and Imaging     Discussed management of the case with KLARISSA Evans who recommended admission       History from:     patient, family member - friend at bedside    History of Present Illness:     Chief Complaint: abdominal wound drainage  Kevin Blair is a 89 y.o. male with pmh of HLD, HTN,  who presents with green drainage from open abdominal wound.  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 His abdominal wound was intentionally left open and he was discharged with wound care.    He saw

## 2024-11-23 NOTE — ED PROVIDER NOTES
[facility] EMERGENCY DEPARTMENT  EMERGENCY DEPARTMENT ENCOUNTER      Pt Name: Kevin Blair  MRN: [id number]  Birthdate 1935  Date of evaluation: 11/22/2024  Provider: Enriqueta Tapia MD  PCP: Blane Callahan MD  Note Started: 7:47 PM EST 11/22/24    ED Attending Attestation Note     CHIEF COMPLAINT       Chief Complaint   Patient presents with    Post-op Problem     Bowel surgery (removed 60 cm small bowel on 11-7-24), patient states wound has discharge that is requiring multiple dressing changes per day. Denies pain, denies fever, denies n/v/d.     EMERGENCY DEPARTMENT COURSE and DIFFERENTIAL DIAGNOSIS/MDM:      This patient was seen by the advance practice provider.  In addition to the MANDY I personally saw Kevin Blair and made/approved the management plan. I take responsibility for the patient management.     Briefly, this is a 89 y.o. male who presents to the emergency department secondary to concern from his nurse at home for increased drainage from his bowel surgery.  The surgery was done on November 7.  He has been doing well since discharge but reportedly nursing did not like how his wound looks so he saw general surgery today in the office (Dr. Sheridan) who did some debridement and recommended getting a wound VAC placed.  Unfortunately the patient is having trouble with insurance to get the wound VAC authorized.  He has family at the bedside who reports he also has not been eating and drinking very well.  Reportedly the drainage earlier was a different color than the one it is now (now green color).  Has been copious in amount since the debridement.  The patient himself is denying any pain.  No fever chills nausea vomiting.    His labs here today show signs of poor p.o. intake with low albumin and anemia but otherwise are largely reassuring.  CT scan of the abdomen and pelvis shows postop changes with no bowel dilatation or drainable fluid collection.    Discussed patient with 
in the SEP-1 Core Measure due to severe sepsis or septic shock?   No   Exclusion criteria - the patient is NOT to be included for SEP-1 Core Measure due to:  Infection is not suspected    Chronic Conditions affecting care:    has a past medical history of Arthritis, Diverticulitis, Hyperlipidemia, Hypertension, and S/P colon resection.    CONSULTS: (Who and What was discussed)  IP CONSULT TO GENERAL SURGERY  IP CONSULT TO HOSPITALIST  Dr. Aquino -General Surgery  Dr. Ny -internal medicine    Social Determinants Significantly Affecting Health : None    Records Reviewed (External and Source) reviewing records patient was seen and taken to the operating room on 11/7 by Dr. Sheridan for exploratory laparotomy with lysis of adhesions and small bowel resection.  Had SBO with midgut volvulus.  75 cm of nonviable small bowel was resected.  Had follow-up appointment today with general surgery with loose PDS sutures removed and the base of the wound gently debrided due to fascia that it appeared to have .  Had Adaptic placed over wound base and written for wound VAC.    CC/HPI Summary, DDx, ED Course, and Reassessment: Patient presented with profuse abdominal drainage.  Bandage was changed here multiple times with greenish discharge.  He continues to deny any pain.  There is loss of social issues going on with patient that they are concerned about how well he is going to be able to take care of his wounds at home.  Unsure of when he is going to be able to get his wound VAC.  CBC and CMP unremarkable.  CT reveals no dilation or drainable fluid collection.  I did discuss this with general surgery who would like patient admitted.  Wound care with Kerlix and ADP pads provided by nursing staff.  Hold off on antibiotics at this time per general surgery recommendations.  Patient was made n.p.o. in case he needs to be taken to the OR for worsening condition.  Patient was stable time of admission.    Disposition

## 2024-11-24 LAB
ANION GAP SERPL CALCULATED.3IONS-SCNC: 14 MMOL/L (ref 3–16)
BASOPHILS # BLD: 0.1 K/UL (ref 0–0.2)
BASOPHILS NFR BLD: 1.3 %
BUN SERPL-MCNC: 19 MG/DL (ref 7–20)
CA-I BLD-SCNC: 1.41 MMOL/L (ref 1.12–1.32)
CALCIUM SERPL-MCNC: 10.1 MG/DL (ref 8.3–10.6)
CHLORIDE SERPL-SCNC: 107 MMOL/L (ref 99–110)
CO2 SERPL-SCNC: 26 MMOL/L (ref 21–32)
CREAT SERPL-MCNC: 1.1 MG/DL (ref 0.8–1.3)
DEPRECATED RDW RBC AUTO: 13.6 % (ref 12.4–15.4)
EOSINOPHIL # BLD: 0.1 K/UL (ref 0–0.6)
EOSINOPHIL NFR BLD: 1.4 %
GFR SERPLBLD CREATININE-BSD FMLA CKD-EPI: 65 ML/MIN/{1.73_M2}
GLUCOSE BLD-MCNC: 130 MG/DL (ref 70–99)
GLUCOSE BLD-MCNC: 133 MG/DL (ref 70–99)
GLUCOSE BLD-MCNC: 165 MG/DL (ref 70–99)
GLUCOSE BLD-MCNC: 206 MG/DL (ref 70–99)
GLUCOSE BLD-MCNC: 21 MG/DL (ref 70–99)
GLUCOSE BLD-MCNC: 22 MG/DL (ref 70–99)
GLUCOSE BLD-MCNC: 60 MG/DL (ref 70–99)
GLUCOSE SERPL-MCNC: 58 MG/DL (ref 70–99)
HCT VFR BLD AUTO: 27.7 % (ref 40.5–52.5)
HGB BLD-MCNC: 9.2 G/DL (ref 13.5–17.5)
LYMPHOCYTES # BLD: 0.5 K/UL (ref 1–5.1)
LYMPHOCYTES NFR BLD: 7.9 %
MAGNESIUM SERPL-MCNC: 2.42 MG/DL (ref 1.8–2.4)
MCH RBC QN AUTO: 31.2 PG (ref 26–34)
MCHC RBC AUTO-ENTMCNC: 33.2 G/DL (ref 31–36)
MCV RBC AUTO: 94 FL (ref 80–100)
MONOCYTES # BLD: 0.4 K/UL (ref 0–1.3)
MONOCYTES NFR BLD: 6 %
NEUTROPHILS # BLD: 5 K/UL (ref 1.7–7.7)
NEUTROPHILS NFR BLD: 83.4 %
PERFORMED ON: ABNORMAL
PH BLDV: 7.37 [PH] (ref 7.35–7.45)
PHOSPHATE SERPL-MCNC: 3.6 MG/DL (ref 2.5–4.9)
PLATELET # BLD AUTO: 544 K/UL (ref 135–450)
PMV BLD AUTO: 7.6 FL (ref 5–10.5)
POTASSIUM SERPL-SCNC: 4 MMOL/L (ref 3.5–5.1)
RBC # BLD AUTO: 2.95 M/UL (ref 4.2–5.9)
SODIUM SERPL-SCNC: 147 MMOL/L (ref 136–145)
TRIGL SERPL-MCNC: 52 MG/DL (ref 0–150)
WBC # BLD AUTO: 6 K/UL (ref 4–11)

## 2024-11-24 PROCEDURE — 82330 ASSAY OF CALCIUM: CPT

## 2024-11-24 PROCEDURE — 2580000003 HC RX 258: Performed by: NURSE PRACTITIONER

## 2024-11-24 PROCEDURE — 2500000003 HC RX 250 WO HCPCS: Performed by: INTERNAL MEDICINE

## 2024-11-24 PROCEDURE — 83735 ASSAY OF MAGNESIUM: CPT

## 2024-11-24 PROCEDURE — 99024 POSTOP FOLLOW-UP VISIT: CPT | Performed by: SURGERY

## 2024-11-24 PROCEDURE — 6360000002 HC RX W HCPCS: Performed by: NURSE PRACTITIONER

## 2024-11-24 PROCEDURE — 84100 ASSAY OF PHOSPHORUS: CPT

## 2024-11-24 PROCEDURE — 6370000000 HC RX 637 (ALT 250 FOR IP): Performed by: INTERNAL MEDICINE

## 2024-11-24 PROCEDURE — 84478 ASSAY OF TRIGLYCERIDES: CPT

## 2024-11-24 PROCEDURE — 2580000003 HC RX 258: Performed by: INTERNAL MEDICINE

## 2024-11-24 PROCEDURE — 1200000000 HC SEMI PRIVATE

## 2024-11-24 PROCEDURE — 80048 BASIC METABOLIC PNL TOTAL CA: CPT

## 2024-11-24 PROCEDURE — 85025 COMPLETE CBC W/AUTO DIFF WBC: CPT

## 2024-11-24 RX ORDER — SODIUM CHLORIDE 9 MG/ML
INJECTION, SOLUTION INTRAVENOUS CONTINUOUS
Status: DISCONTINUED | OUTPATIENT
Start: 2024-11-24 | End: 2024-11-24

## 2024-11-24 RX ORDER — DEXTROSE MONOHYDRATE 100 MG/ML
INJECTION, SOLUTION INTRAVENOUS CONTINUOUS PRN
Status: DISCONTINUED | OUTPATIENT
Start: 2024-11-24 | End: 2024-12-10 | Stop reason: HOSPADM

## 2024-11-24 RX ORDER — SODIUM CHLORIDE, SODIUM LACTATE, POTASSIUM CHLORIDE, CALCIUM CHLORIDE 600; 310; 30; 20 MG/100ML; MG/100ML; MG/100ML; MG/100ML
INJECTION, SOLUTION INTRAVENOUS CONTINUOUS
Status: DISCONTINUED | OUTPATIENT
Start: 2024-11-24 | End: 2024-11-24

## 2024-11-24 RX ORDER — INSULIN LISPRO 100 [IU]/ML
0-4 INJECTION, SOLUTION INTRAVENOUS; SUBCUTANEOUS EVERY 4 HOURS
Status: DISCONTINUED | OUTPATIENT
Start: 2024-11-24 | End: 2024-12-01

## 2024-11-24 RX ORDER — GLUCAGON 1 MG/ML
1 KIT INJECTION PRN
Status: DISCONTINUED | OUTPATIENT
Start: 2024-11-24 | End: 2024-12-10 | Stop reason: HOSPADM

## 2024-11-24 RX ADMIN — PIPERACILLIN SODIUM AND TAZOBACTAM SODIUM 3375 MG: 3; .375 INJECTION, SOLUTION INTRAVENOUS at 21:42

## 2024-11-24 RX ADMIN — INSULIN LISPRO 1 UNITS: 100 INJECTION, SOLUTION INTRAVENOUS; SUBCUTANEOUS at 13:54

## 2024-11-24 RX ADMIN — ENOXAPARIN SODIUM 40 MG: 100 INJECTION SUBCUTANEOUS at 08:49

## 2024-11-24 RX ADMIN — SODIUM CHLORIDE: 9 INJECTION, SOLUTION INTRAVENOUS at 21:40

## 2024-11-24 RX ADMIN — Medication 10 ML: at 21:30

## 2024-11-24 RX ADMIN — LEUCINE, PHENYLALANINE, LYSINE, METHIONINE, ISOLEUCINE, VALINE, HISTIDINE, THREONINE, TRYPTOPHAN, ALANINE, GLYCINE, ARGININE, PROLINE, SERINE, TYROSINE, SODIUM ACETATE, DIBASIC POTASSIUM PHOSPHATE, MAGNESIUM CHLORIDE, SODIUM CHLORIDE, CALCIUM CHLORIDE, DEXTROSE
1035; 575; 33; 20; 515; 240; 300; 365; 290; 51; 200; 280; 261; 340; 250; 340; 59; 210; 90; 20; 290 INJECTION INTRAVENOUS at 11:07

## 2024-11-24 RX ADMIN — SODIUM CHLORIDE, PRESERVATIVE FREE 10 ML: 5 INJECTION INTRAVENOUS at 21:31

## 2024-11-24 RX ADMIN — DEXTROSE MONOHYDRATE 250 ML: 100 INJECTION, SOLUTION INTRAVENOUS at 10:14

## 2024-11-24 RX ADMIN — DEXTROSE MONOHYDRATE 250 ML: 100 INJECTION, SOLUTION INTRAVENOUS at 10:52

## 2024-11-24 RX ADMIN — PIPERACILLIN SODIUM AND TAZOBACTAM SODIUM 3375 MG: 3; .375 INJECTION, SOLUTION INTRAVENOUS at 04:19

## 2024-11-24 RX ADMIN — SODIUM CHLORIDE, PRESERVATIVE FREE 10 ML: 5 INJECTION INTRAVENOUS at 08:54

## 2024-11-24 RX ADMIN — PIPERACILLIN SODIUM AND TAZOBACTAM SODIUM 3375 MG: 3; .375 INJECTION, SOLUTION INTRAVENOUS at 12:40

## 2024-11-24 RX ADMIN — SODIUM CHLORIDE, PRESERVATIVE FREE 10 ML: 5 INJECTION INTRAVENOUS at 21:30

## 2024-11-24 RX ADMIN — DEXTROSE MONOHYDRATE 125 ML: 100 INJECTION, SOLUTION INTRAVENOUS at 10:56

## 2024-11-24 ASSESSMENT — PAIN SCALES - GENERAL: PAINLEVEL_OUTOF10: 0

## 2024-11-25 ENCOUNTER — ANESTHESIA (OUTPATIENT)
Dept: OPERATING ROOM | Age: 89
End: 2024-11-25
Payer: MEDICARE

## 2024-11-25 ENCOUNTER — APPOINTMENT (OUTPATIENT)
Dept: GENERAL RADIOLOGY | Age: 89
DRG: 329 | End: 2024-11-25
Payer: MEDICARE

## 2024-11-25 ENCOUNTER — ANESTHESIA EVENT (OUTPATIENT)
Dept: OPERATING ROOM | Age: 89
End: 2024-11-25
Payer: MEDICARE

## 2024-11-25 PROBLEM — L76.82 POSTOPERATIVE SURGICAL COMPLICATION INVOLVING SKIN ASSOCIATED WITH NON-DERMATOLOGIC PROCEDURE: Status: ACTIVE | Noted: 2024-11-25

## 2024-11-25 LAB
ANION GAP SERPL CALCULATED.3IONS-SCNC: 6 MMOL/L (ref 3–16)
BASOPHILS # BLD: 0.1 K/UL (ref 0–0.2)
BASOPHILS NFR BLD: 1 %
BUN SERPL-MCNC: 13 MG/DL (ref 7–20)
CALCIUM SERPL-MCNC: 10.4 MG/DL (ref 8.3–10.6)
CHLORIDE SERPL-SCNC: 109 MMOL/L (ref 99–110)
CO2 SERPL-SCNC: 32 MMOL/L (ref 21–32)
CREAT SERPL-MCNC: 1 MG/DL (ref 0.8–1.3)
DEPRECATED RDW RBC AUTO: 13.3 % (ref 12.4–15.4)
EOSINOPHIL # BLD: 0.2 K/UL (ref 0–0.6)
EOSINOPHIL NFR BLD: 3 %
GFR SERPLBLD CREATININE-BSD FMLA CKD-EPI: 72 ML/MIN/{1.73_M2}
GLUCOSE BLD-MCNC: 112 MG/DL (ref 70–99)
GLUCOSE BLD-MCNC: 157 MG/DL (ref 70–99)
GLUCOSE BLD-MCNC: 186 MG/DL (ref 70–99)
GLUCOSE BLD-MCNC: 222 MG/DL (ref 70–99)
GLUCOSE BLD-MCNC: 262 MG/DL (ref 70–99)
GLUCOSE BLD-MCNC: 73 MG/DL (ref 70–99)
GLUCOSE SERPL-MCNC: 126 MG/DL (ref 70–99)
HCT VFR BLD AUTO: 26.6 % (ref 40.5–52.5)
HGB BLD-MCNC: 9.1 G/DL (ref 13.5–17.5)
LYMPHOCYTES # BLD: 0.6 K/UL (ref 1–5.1)
LYMPHOCYTES NFR BLD: 10 %
MAGNESIUM SERPL-MCNC: 2.63 MG/DL (ref 1.8–2.4)
MCH RBC QN AUTO: 31.7 PG (ref 26–34)
MCHC RBC AUTO-ENTMCNC: 34.1 G/DL (ref 31–36)
MCV RBC AUTO: 92.9 FL (ref 80–100)
MONOCYTES # BLD: 0.5 K/UL (ref 0–1.3)
MONOCYTES NFR BLD: 9.8 %
NEUTROPHILS # BLD: 4.3 K/UL (ref 1.7–7.7)
NEUTROPHILS NFR BLD: 76.2 %
PERFORMED ON: ABNORMAL
PERFORMED ON: NORMAL
PHOSPHATE SERPL-MCNC: 2.6 MG/DL (ref 2.5–4.9)
PLATELET # BLD AUTO: 507 K/UL (ref 135–450)
PMV BLD AUTO: 7.7 FL (ref 5–10.5)
POTASSIUM SERPL-SCNC: 3.2 MMOL/L (ref 3.5–5.1)
RBC # BLD AUTO: 2.87 M/UL (ref 4.2–5.9)
SODIUM SERPL-SCNC: 147 MMOL/L (ref 136–145)
WBC # BLD AUTO: 5.6 K/UL (ref 4–11)

## 2024-11-25 PROCEDURE — P9045 ALBUMIN (HUMAN), 5%, 250 ML: HCPCS | Performed by: STUDENT IN AN ORGANIZED HEALTH CARE EDUCATION/TRAINING PROGRAM

## 2024-11-25 PROCEDURE — 6360000002 HC RX W HCPCS: Performed by: SURGERY

## 2024-11-25 PROCEDURE — 3E0G76Z INTRODUCTION OF NUTRITIONAL SUBSTANCE INTO UPPER GI, VIA NATURAL OR ARTIFICIAL OPENING: ICD-10-PCS | Performed by: RADIOLOGY

## 2024-11-25 PROCEDURE — 2580000003 HC RX 258: Performed by: INTERNAL MEDICINE

## 2024-11-25 PROCEDURE — 6360000002 HC RX W HCPCS: Performed by: ANESTHESIOLOGY

## 2024-11-25 PROCEDURE — 3700000001 HC ADD 15 MINUTES (ANESTHESIA): Performed by: SURGERY

## 2024-11-25 PROCEDURE — 2500000003 HC RX 250 WO HCPCS: Performed by: STUDENT IN AN ORGANIZED HEALTH CARE EDUCATION/TRAINING PROGRAM

## 2024-11-25 PROCEDURE — 2580000003 HC RX 258: Performed by: SURGERY

## 2024-11-25 PROCEDURE — 6370000000 HC RX 637 (ALT 250 FOR IP): Performed by: SURGERY

## 2024-11-25 PROCEDURE — 0DH67UZ INSERTION OF FEEDING DEVICE INTO STOMACH, VIA NATURAL OR ARTIFICIAL OPENING: ICD-10-PCS | Performed by: RADIOLOGY

## 2024-11-25 PROCEDURE — 0DN80ZZ RELEASE SMALL INTESTINE, OPEN APPROACH: ICD-10-PCS | Performed by: SURGERY

## 2024-11-25 PROCEDURE — 3600000004 HC SURGERY LEVEL 4 BASE: Performed by: SURGERY

## 2024-11-25 PROCEDURE — 2500000003 HC RX 250 WO HCPCS: Performed by: SURGERY

## 2024-11-25 PROCEDURE — 7100000001 HC PACU RECOVERY - ADDTL 15 MIN: Performed by: SURGERY

## 2024-11-25 PROCEDURE — 2720000010 HC SURG SUPPLY STERILE: Performed by: SURGERY

## 2024-11-25 PROCEDURE — 88307 TISSUE EXAM BY PATHOLOGIST: CPT

## 2024-11-25 PROCEDURE — 6370000000 HC RX 637 (ALT 250 FOR IP): Performed by: INTERNAL MEDICINE

## 2024-11-25 PROCEDURE — 84100 ASSAY OF PHOSPHORUS: CPT

## 2024-11-25 PROCEDURE — 7100000000 HC PACU RECOVERY - FIRST 15 MIN: Performed by: SURGERY

## 2024-11-25 PROCEDURE — 44120 REMOVAL OF SMALL INTESTINE: CPT | Performed by: SURGERY

## 2024-11-25 PROCEDURE — 3600000014 HC SURGERY LEVEL 4 ADDTL 15MIN: Performed by: SURGERY

## 2024-11-25 PROCEDURE — 3700000000 HC ANESTHESIA ATTENDED CARE: Performed by: SURGERY

## 2024-11-25 PROCEDURE — 80048 BASIC METABOLIC PNL TOTAL CA: CPT

## 2024-11-25 PROCEDURE — 6360000002 HC RX W HCPCS

## 2024-11-25 PROCEDURE — 0DB80ZZ EXCISION OF SMALL INTESTINE, OPEN APPROACH: ICD-10-PCS | Performed by: SURGERY

## 2024-11-25 PROCEDURE — 88342 IMHCHEM/IMCYTCHM 1ST ANTB: CPT

## 2024-11-25 PROCEDURE — 88341 IMHCHEM/IMCYTCHM EA ADD ANTB: CPT

## 2024-11-25 PROCEDURE — 83735 ASSAY OF MAGNESIUM: CPT

## 2024-11-25 PROCEDURE — 2709999900 HC NON-CHARGEABLE SUPPLY: Performed by: SURGERY

## 2024-11-25 PROCEDURE — 1200000000 HC SEMI PRIVATE

## 2024-11-25 PROCEDURE — 6360000002 HC RX W HCPCS: Performed by: STUDENT IN AN ORGANIZED HEALTH CARE EDUCATION/TRAINING PROGRAM

## 2024-11-25 PROCEDURE — 6360000002 HC RX W HCPCS: Performed by: NURSE PRACTITIONER

## 2024-11-25 PROCEDURE — 74018 RADEX ABDOMEN 1 VIEW: CPT

## 2024-11-25 PROCEDURE — 85025 COMPLETE CBC W/AUTO DIFF WBC: CPT

## 2024-11-25 RX ORDER — ALBUMIN HUMAN 50 G/1000ML
SOLUTION INTRAVENOUS
Status: DISCONTINUED | OUTPATIENT
Start: 2024-11-25 | End: 2024-11-25 | Stop reason: SDUPTHER

## 2024-11-25 RX ORDER — LABETALOL HYDROCHLORIDE 5 MG/ML
10 INJECTION, SOLUTION INTRAVENOUS
Status: DISCONTINUED | OUTPATIENT
Start: 2024-11-25 | End: 2024-11-25 | Stop reason: HOSPADM

## 2024-11-25 RX ORDER — OXYCODONE HYDROCHLORIDE 5 MG/1
5 TABLET ORAL
Status: DISCONTINUED | OUTPATIENT
Start: 2024-11-25 | End: 2024-11-25 | Stop reason: HOSPADM

## 2024-11-25 RX ORDER — FENTANYL CITRATE 50 UG/ML
25 INJECTION, SOLUTION INTRAMUSCULAR; INTRAVENOUS EVERY 5 MIN PRN
Status: DISCONTINUED | OUTPATIENT
Start: 2024-11-25 | End: 2024-11-25 | Stop reason: HOSPADM

## 2024-11-25 RX ORDER — HYDROMORPHONE HYDROCHLORIDE 2 MG/ML
0.5 INJECTION, SOLUTION INTRAMUSCULAR; INTRAVENOUS; SUBCUTANEOUS EVERY 5 MIN PRN
Status: COMPLETED | OUTPATIENT
Start: 2024-11-25 | End: 2024-11-25

## 2024-11-25 RX ORDER — ENOXAPARIN SODIUM 100 MG/ML
40 INJECTION SUBCUTANEOUS DAILY
Status: DISCONTINUED | OUTPATIENT
Start: 2024-11-26 | End: 2024-12-10 | Stop reason: HOSPADM

## 2024-11-25 RX ORDER — ROCURONIUM BROMIDE 10 MG/ML
INJECTION, SOLUTION INTRAVENOUS
Status: DISCONTINUED | OUTPATIENT
Start: 2024-11-25 | End: 2024-11-25 | Stop reason: SDUPTHER

## 2024-11-25 RX ORDER — PROCHLORPERAZINE EDISYLATE 5 MG/ML
5 INJECTION INTRAMUSCULAR; INTRAVENOUS
Status: DISCONTINUED | OUTPATIENT
Start: 2024-11-25 | End: 2024-11-25 | Stop reason: HOSPADM

## 2024-11-25 RX ORDER — HYDROMORPHONE HYDROCHLORIDE 2 MG/ML
0.25 INJECTION, SOLUTION INTRAMUSCULAR; INTRAVENOUS; SUBCUTANEOUS
Status: DISCONTINUED | OUTPATIENT
Start: 2024-11-25 | End: 2024-12-10 | Stop reason: HOSPADM

## 2024-11-25 RX ORDER — ONDANSETRON 2 MG/ML
4 INJECTION INTRAMUSCULAR; INTRAVENOUS
Status: DISCONTINUED | OUTPATIENT
Start: 2024-11-25 | End: 2024-11-25 | Stop reason: HOSPADM

## 2024-11-25 RX ORDER — MAGNESIUM HYDROXIDE 1200 MG/15ML
LIQUID ORAL CONTINUOUS PRN
Status: COMPLETED | OUTPATIENT
Start: 2024-11-25 | End: 2024-11-25

## 2024-11-25 RX ORDER — ONDANSETRON 2 MG/ML
INJECTION INTRAMUSCULAR; INTRAVENOUS
Status: DISCONTINUED | OUTPATIENT
Start: 2024-11-25 | End: 2024-11-25 | Stop reason: SDUPTHER

## 2024-11-25 RX ORDER — DIPHENHYDRAMINE HYDROCHLORIDE 50 MG/ML
12.5 INJECTION INTRAMUSCULAR; INTRAVENOUS
Status: DISCONTINUED | OUTPATIENT
Start: 2024-11-25 | End: 2024-11-25 | Stop reason: HOSPADM

## 2024-11-25 RX ORDER — SODIUM CHLORIDE 0.9 % (FLUSH) 0.9 %
5-40 SYRINGE (ML) INJECTION EVERY 12 HOURS SCHEDULED
Status: DISCONTINUED | OUTPATIENT
Start: 2024-11-25 | End: 2024-11-25 | Stop reason: HOSPADM

## 2024-11-25 RX ORDER — SODIUM CHLORIDE 9 MG/ML
INJECTION, SOLUTION INTRAVENOUS PRN
Status: DISCONTINUED | OUTPATIENT
Start: 2024-11-25 | End: 2024-11-25 | Stop reason: HOSPADM

## 2024-11-25 RX ORDER — FENTANYL CITRATE 50 UG/ML
INJECTION, SOLUTION INTRAMUSCULAR; INTRAVENOUS
Status: DISCONTINUED | OUTPATIENT
Start: 2024-11-25 | End: 2024-11-25 | Stop reason: SDUPTHER

## 2024-11-25 RX ORDER — HYDROMORPHONE HYDROCHLORIDE 2 MG/ML
INJECTION, SOLUTION INTRAMUSCULAR; INTRAVENOUS; SUBCUTANEOUS
Status: COMPLETED
Start: 2024-11-25 | End: 2024-11-25

## 2024-11-25 RX ORDER — THIAMINE HYDROCHLORIDE 100 MG/ML
100 INJECTION, SOLUTION INTRAMUSCULAR; INTRAVENOUS DAILY
Status: DISCONTINUED | OUTPATIENT
Start: 2024-11-25 | End: 2024-11-25

## 2024-11-25 RX ORDER — DEXAMETHASONE SODIUM PHOSPHATE 4 MG/ML
INJECTION, SOLUTION INTRA-ARTICULAR; INTRALESIONAL; INTRAMUSCULAR; INTRAVENOUS; SOFT TISSUE
Status: DISCONTINUED | OUTPATIENT
Start: 2024-11-25 | End: 2024-11-25 | Stop reason: SDUPTHER

## 2024-11-25 RX ORDER — LIDOCAINE HYDROCHLORIDE 20 MG/ML
INJECTION, SOLUTION EPIDURAL; INFILTRATION; INTRACAUDAL; PERINEURAL
Status: DISCONTINUED | OUTPATIENT
Start: 2024-11-25 | End: 2024-11-25 | Stop reason: SDUPTHER

## 2024-11-25 RX ORDER — IPRATROPIUM BROMIDE AND ALBUTEROL SULFATE 2.5; .5 MG/3ML; MG/3ML
1 SOLUTION RESPIRATORY (INHALATION)
Status: DISCONTINUED | OUTPATIENT
Start: 2024-11-25 | End: 2024-11-25 | Stop reason: HOSPADM

## 2024-11-25 RX ORDER — NALOXONE HYDROCHLORIDE 0.4 MG/ML
INJECTION, SOLUTION INTRAMUSCULAR; INTRAVENOUS; SUBCUTANEOUS PRN
Status: DISCONTINUED | OUTPATIENT
Start: 2024-11-25 | End: 2024-11-25 | Stop reason: HOSPADM

## 2024-11-25 RX ORDER — PROPOFOL 10 MG/ML
INJECTION, EMULSION INTRAVENOUS
Status: DISCONTINUED | OUTPATIENT
Start: 2024-11-25 | End: 2024-11-25 | Stop reason: SDUPTHER

## 2024-11-25 RX ORDER — LORAZEPAM 2 MG/ML
0.5 INJECTION INTRAMUSCULAR
Status: DISCONTINUED | OUTPATIENT
Start: 2024-11-25 | End: 2024-11-25 | Stop reason: HOSPADM

## 2024-11-25 RX ORDER — HYDROMORPHONE HYDROCHLORIDE 2 MG/ML
0.5 INJECTION, SOLUTION INTRAMUSCULAR; INTRAVENOUS; SUBCUTANEOUS
Status: DISCONTINUED | OUTPATIENT
Start: 2024-11-25 | End: 2024-12-10 | Stop reason: HOSPADM

## 2024-11-25 RX ORDER — SODIUM CHLORIDE 0.9 % (FLUSH) 0.9 %
5-40 SYRINGE (ML) INJECTION PRN
Status: DISCONTINUED | OUTPATIENT
Start: 2024-11-25 | End: 2024-11-25 | Stop reason: HOSPADM

## 2024-11-25 RX ADMIN — SUGAMMADEX 100 MG: 100 INJECTION, SOLUTION INTRAVENOUS at 12:18

## 2024-11-25 RX ADMIN — FENTANYL CITRATE 25 MCG: 50 INJECTION, SOLUTION INTRAMUSCULAR; INTRAVENOUS at 13:24

## 2024-11-25 RX ADMIN — HYDROMORPHONE HYDROCHLORIDE 0.5 MG: 2 INJECTION, SOLUTION INTRAMUSCULAR; INTRAVENOUS; SUBCUTANEOUS at 19:36

## 2024-11-25 RX ADMIN — HYDROMORPHONE HYDROCHLORIDE 0.5 MG: 2 INJECTION, SOLUTION INTRAMUSCULAR; INTRAVENOUS; SUBCUTANEOUS at 13:10

## 2024-11-25 RX ADMIN — SODIUM CHLORIDE, PRESERVATIVE FREE 10 ML: 5 INJECTION INTRAVENOUS at 19:37

## 2024-11-25 RX ADMIN — HYDROMORPHONE HYDROCHLORIDE 0.5 MG: 2 INJECTION, SOLUTION INTRAMUSCULAR; INTRAVENOUS; SUBCUTANEOUS at 12:50

## 2024-11-25 RX ADMIN — ROCURONIUM BROMIDE 80 MG: 10 INJECTION, SOLUTION INTRAVENOUS at 10:36

## 2024-11-25 RX ADMIN — ASCORBIC ACID, VITAMIN A PALMITATE, CHOLECALCIFEROL, THIAMINE HYDROCHLORIDE, RIBOFLAVIN-5 PHOSPHATE SODIUM, PYRIDOXINE HYDROCHLORIDE, NIACINAMIDE, DEXPANTHENOL, ALPHA-TOCOPHEROL ACETATE, VITAMIN K1, FOLIC ACID, BIOTIN, CYANOCOBALAMIN: 200; 3300; 200; 6; 3.6; 6; 40; 15; 10; 150; 600; 60; 5 INJECTION, SOLUTION INTRAVENOUS at 17:59

## 2024-11-25 RX ADMIN — FENTANYL CITRATE 25 MCG: 50 INJECTION, SOLUTION INTRAMUSCULAR; INTRAVENOUS at 13:49

## 2024-11-25 RX ADMIN — ALBUMIN (HUMAN) 12.5 G: 12.5 INJECTION, SOLUTION INTRAVENOUS at 11:16

## 2024-11-25 RX ADMIN — FENTANYL CITRATE 25 MCG: 50 INJECTION, SOLUTION INTRAMUSCULAR; INTRAVENOUS at 13:38

## 2024-11-25 RX ADMIN — INSULIN LISPRO 1 UNITS: 100 INJECTION, SOLUTION INTRAVENOUS; SUBCUTANEOUS at 03:18

## 2024-11-25 RX ADMIN — I.V. FAT EMULSION 250 ML: 20 EMULSION INTRAVENOUS at 18:00

## 2024-11-25 RX ADMIN — HYDROMORPHONE HYDROCHLORIDE 0.5 MG: 2 INJECTION, SOLUTION INTRAMUSCULAR; INTRAVENOUS; SUBCUTANEOUS at 13:19

## 2024-11-25 RX ADMIN — PIPERACILLIN SODIUM AND TAZOBACTAM SODIUM 3375 MG: 3; .375 INJECTION, SOLUTION INTRAVENOUS at 15:51

## 2024-11-25 RX ADMIN — PROPOFOL 50 MG: 10 INJECTION, EMULSION INTRAVENOUS at 10:36

## 2024-11-25 RX ADMIN — ONDANSETRON 4 MG: 2 INJECTION INTRAMUSCULAR; INTRAVENOUS at 11:17

## 2024-11-25 RX ADMIN — DEXAMETHASONE SODIUM PHOSPHATE 4 MG: 4 INJECTION, SOLUTION INTRAMUSCULAR; INTRAVENOUS at 11:17

## 2024-11-25 RX ADMIN — Medication 10 ML: at 19:37

## 2024-11-25 RX ADMIN — HYDROMORPHONE HYDROCHLORIDE 0.5 MG: 2 INJECTION, SOLUTION INTRAMUSCULAR; INTRAVENOUS; SUBCUTANEOUS at 12:56

## 2024-11-25 RX ADMIN — LIDOCAINE HYDROCHLORIDE 100 MG: 20 INJECTION, SOLUTION EPIDURAL; INFILTRATION; INTRACAUDAL; PERINEURAL at 10:36

## 2024-11-25 RX ADMIN — THIAMINE HYDROCHLORIDE 100 MG: 100 INJECTION, SOLUTION INTRAMUSCULAR; INTRAVENOUS at 18:10

## 2024-11-25 RX ADMIN — FENTANYL CITRATE 25 MCG: 50 INJECTION, SOLUTION INTRAMUSCULAR; INTRAVENOUS at 10:36

## 2024-11-25 RX ADMIN — INSULIN LISPRO 1 UNITS: 100 INJECTION, SOLUTION INTRAVENOUS; SUBCUTANEOUS at 18:15

## 2024-11-25 RX ADMIN — HYDROMORPHONE HYDROCHLORIDE 0.5 MG: 2 INJECTION, SOLUTION INTRAMUSCULAR; INTRAVENOUS; SUBCUTANEOUS at 23:28

## 2024-11-25 RX ADMIN — SODIUM CHLORIDE: 9 INJECTION, SOLUTION INTRAVENOUS at 12:29

## 2024-11-25 RX ADMIN — PIPERACILLIN SODIUM AND TAZOBACTAM SODIUM 3375 MG: 3; .375 INJECTION, SOLUTION INTRAVENOUS at 04:52

## 2024-11-25 RX ADMIN — FENTANYL CITRATE 25 MCG: 50 INJECTION, SOLUTION INTRAMUSCULAR; INTRAVENOUS at 12:14

## 2024-11-25 RX ADMIN — PIPERACILLIN SODIUM AND TAZOBACTAM SODIUM 3375 MG: 3; .375 INJECTION, SOLUTION INTRAVENOUS at 21:13

## 2024-11-25 ASSESSMENT — PAIN DESCRIPTION - PAIN TYPE
TYPE: SURGICAL PAIN
TYPE: SURGICAL PAIN

## 2024-11-25 ASSESSMENT — PAIN SCALES - GENERAL
PAINLEVEL_OUTOF10: 10
PAINLEVEL_OUTOF10: 10
PAINLEVEL_OUTOF10: 0
PAINLEVEL_OUTOF10: 10

## 2024-11-25 ASSESSMENT — PAIN DESCRIPTION - LOCATION
LOCATION: ABDOMEN
LOCATION: ABDOMEN

## 2024-11-25 ASSESSMENT — PAIN DESCRIPTION - DESCRIPTORS
DESCRIPTORS: CRAMPING
DESCRIPTORS: ACHING

## 2024-11-25 ASSESSMENT — PAIN DESCRIPTION - ORIENTATION
ORIENTATION: MID
ORIENTATION: MID

## 2024-11-25 ASSESSMENT — PAIN - FUNCTIONAL ASSESSMENT
PAIN_FUNCTIONAL_ASSESSMENT: ACTIVITIES ARE NOT PREVENTED
PAIN_FUNCTIONAL_ASSESSMENT: 0-10
PAIN_FUNCTIONAL_ASSESSMENT: ACTIVITIES ARE NOT PREVENTED

## 2024-11-25 NOTE — ANESTHESIA PRE PROCEDURE
Tobacco Use   • Smoking status: Former     Current packs/day: 0.00     Types: Cigarettes     Quit date: 2011     Years since quittin.8   • Smokeless tobacco: Never   Substance Use Topics   • Alcohol use: No                                Counseling given: Not Answered      Vital Signs (Current):   Vitals:    244 24 2318 24 0915   BP:  125/71 120/67 108/67   Pulse: 66 82 70 87   Resp:  16 16 18   Temp:  98 °F (36.7 °C) 97.9 °F (36.6 °C) 97.7 °F (36.5 °C)   TempSrc:  Oral Oral Oral   SpO2:  90% 92% 93%   Weight:       Height:                                                  BP Readings from Last 3 Encounters:   24 108/67   24 116/68   24 123/65       NPO Status:                                                                                 BMI:   Wt Readings from Last 3 Encounters:   24 49.1 kg (108 lb 3.9 oz)   24 53.5 kg (118 lb)   24 59.9 kg (132 lb)     Body mass index is 16.22 kg/m².    CBC:   Lab Results   Component Value Date/Time    WBC 5.6 2024 04:56 AM    RBC 2.87 2024 04:56 AM    HGB 9.1 2024 04:56 AM    HCT 26.6 2024 04:56 AM    MCV 92.9 2024 04:56 AM    RDW 13.3 2024 04:56 AM     2024 04:56 AM       CMP:   Lab Results   Component Value Date/Time     2024 04:55 AM    K 3.2 2024 04:55 AM     2024 04:55 AM    CO2 32 2024 04:55 AM    BUN 13 2024 04:55 AM    CREATININE 1.0 2024 04:55 AM    GFRAA >60 2022 11:25 AM    GFRAA 47 2013 03:15 AM    AGRATIO 0.9 2024 04:59 AM    LABGLOM 72 2024 04:55 AM    LABGLOM >60 2023 11:19 AM    GLUCOSE 126 2024 04:55 AM    CALCIUM 10.4 2024 04:55 AM    BILITOT 0.4 2024 04:59 AM    ALKPHOS 74 2024 04:59 AM    AST 18 2024 04:59 AM    ALT 13 2024 04:59 AM       POC Tests:   Recent Labs     24  0933   POCGLU 112*       Coags:   Lab

## 2024-11-25 NOTE — ANESTHESIA POSTPROCEDURE EVALUATION
Department of Anesthesiology  Postprocedure Note    Patient: Kevin Blair  MRN: [id number]  YOB: 1935  Date of evaluation: 11/25/2024    Procedure Summary       Date: 11/25/24 Room / Location: [address]    Anesthesia Start: 1031 Anesthesia Stop: 1243    Procedures:       EXPLORATORY LAPAROTOMY (Abdomen)      SMALL BOWEL RESECTION (Abdomen) Diagnosis:       Small bowel obstruction (HCC)      (Small bowel obstruction (HCC) [K56.609])    Surgeons: Nghia Sheridan MD Responsible Provider: Abhishek Horvath MD    Anesthesia Type: general ASA Status: 3            Anesthesia Type: No value filed.    Leia Phase I: Leia Score: 9    Leia Phase II:      Anesthesia Post Evaluation    Patient location during evaluation: PACU  Patient participation: complete - patient participated  Level of consciousness: awake  Airway patency: patent  Nausea & Vomiting: no nausea and no vomiting  Cardiovascular status: blood pressure returned to baseline and hemodynamically stable  Respiratory status: acceptable  Hydration status: euvolemic  Multimodal analgesia pain management approach  Pain management: adequate    No notable events documented.

## 2024-11-26 LAB
ANION GAP SERPL CALCULATED.3IONS-SCNC: 6 MMOL/L (ref 3–16)
BASOPHILS # BLD: 0.1 K/UL (ref 0–0.2)
BASOPHILS NFR BLD: 0.5 %
BUN SERPL-MCNC: 17 MG/DL (ref 7–20)
CALCIUM SERPL-MCNC: 9.7 MG/DL (ref 8.3–10.6)
CHLORIDE SERPL-SCNC: 110 MMOL/L (ref 99–110)
CO2 SERPL-SCNC: 29 MMOL/L (ref 21–32)
CREAT SERPL-MCNC: 1 MG/DL (ref 0.8–1.3)
DEPRECATED RDW RBC AUTO: 13.3 % (ref 12.4–15.4)
EOSINOPHIL # BLD: 0 K/UL (ref 0–0.6)
EOSINOPHIL NFR BLD: 0.4 %
GFR SERPLBLD CREATININE-BSD FMLA CKD-EPI: 72 ML/MIN/{1.73_M2}
GLUCOSE BLD-MCNC: 152 MG/DL (ref 70–99)
GLUCOSE BLD-MCNC: 158 MG/DL (ref 70–99)
GLUCOSE BLD-MCNC: 170 MG/DL (ref 70–99)
GLUCOSE BLD-MCNC: 193 MG/DL (ref 70–99)
GLUCOSE BLD-MCNC: 194 MG/DL (ref 70–99)
GLUCOSE BLD-MCNC: 199 MG/DL (ref 70–99)
GLUCOSE SERPL-MCNC: 177 MG/DL (ref 70–99)
HCT VFR BLD AUTO: 29.7 % (ref 40.5–52.5)
HGB BLD-MCNC: 10 G/DL (ref 13.5–17.5)
LYMPHOCYTES # BLD: 0.4 K/UL (ref 1–5.1)
LYMPHOCYTES NFR BLD: 3.5 %
MAGNESIUM SERPL-MCNC: 2.37 MG/DL (ref 1.8–2.4)
MCH RBC QN AUTO: 31.6 PG (ref 26–34)
MCHC RBC AUTO-ENTMCNC: 33.6 G/DL (ref 31–36)
MCV RBC AUTO: 94.1 FL (ref 80–100)
MONOCYTES # BLD: 0.9 K/UL (ref 0–1.3)
MONOCYTES NFR BLD: 8.3 %
NEUTROPHILS # BLD: 10 K/UL (ref 1.7–7.7)
NEUTROPHILS NFR BLD: 87.3 %
PERFORMED ON: ABNORMAL
PHOSPHATE SERPL-MCNC: 2.4 MG/DL (ref 2.5–4.9)
PLATELET # BLD AUTO: 523 K/UL (ref 135–450)
PMV BLD AUTO: 7.5 FL (ref 5–10.5)
POTASSIUM SERPL-SCNC: 3.4 MMOL/L (ref 3.5–5.1)
POTASSIUM UR-SCNC: 92.6 MMOL/L
RBC # BLD AUTO: 3.15 M/UL (ref 4.2–5.9)
SODIUM SERPL-SCNC: 145 MMOL/L (ref 136–145)
WBC # BLD AUTO: 10.9 K/UL (ref 4–11)

## 2024-11-26 PROCEDURE — 2580000003 HC RX 258: Performed by: SURGERY

## 2024-11-26 PROCEDURE — 85025 COMPLETE CBC W/AUTO DIFF WBC: CPT

## 2024-11-26 PROCEDURE — 80048 BASIC METABOLIC PNL TOTAL CA: CPT

## 2024-11-26 PROCEDURE — 1200000000 HC SEMI PRIVATE

## 2024-11-26 PROCEDURE — 6360000002 HC RX W HCPCS: Performed by: SURGERY

## 2024-11-26 PROCEDURE — 99024 POSTOP FOLLOW-UP VISIT: CPT | Performed by: SURGERY

## 2024-11-26 PROCEDURE — 6370000000 HC RX 637 (ALT 250 FOR IP): Performed by: SURGERY

## 2024-11-26 PROCEDURE — 84133 ASSAY OF URINE POTASSIUM: CPT

## 2024-11-26 PROCEDURE — 2500000003 HC RX 250 WO HCPCS: Performed by: INTERNAL MEDICINE

## 2024-11-26 PROCEDURE — 2700000000 HC OXYGEN THERAPY PER DAY

## 2024-11-26 PROCEDURE — 2580000003 HC RX 258: Performed by: INTERNAL MEDICINE

## 2024-11-26 PROCEDURE — 0T2BX0Z CHANGE DRAINAGE DEVICE IN BLADDER, EXTERNAL APPROACH: ICD-10-PCS | Performed by: UROLOGY

## 2024-11-26 PROCEDURE — 36415 COLL VENOUS BLD VENIPUNCTURE: CPT

## 2024-11-26 PROCEDURE — 87040 BLOOD CULTURE FOR BACTERIA: CPT

## 2024-11-26 PROCEDURE — 94760 N-INVAS EAR/PLS OXIMETRY 1: CPT

## 2024-11-26 PROCEDURE — 6360000002 HC RX W HCPCS: Performed by: STUDENT IN AN ORGANIZED HEALTH CARE EDUCATION/TRAINING PROGRAM

## 2024-11-26 PROCEDURE — 6360000002 HC RX W HCPCS: Performed by: INTERNAL MEDICINE

## 2024-11-26 PROCEDURE — 83735 ASSAY OF MAGNESIUM: CPT

## 2024-11-26 PROCEDURE — 84100 ASSAY OF PHOSPHORUS: CPT

## 2024-11-26 RX ORDER — POTASSIUM CHLORIDE 7.45 MG/ML
10 INJECTION INTRAVENOUS ONCE
Status: COMPLETED | OUTPATIENT
Start: 2024-11-26 | End: 2024-11-26

## 2024-11-26 RX ORDER — THIAMINE HYDROCHLORIDE 100 MG/ML
100 INJECTION, SOLUTION INTRAMUSCULAR; INTRAVENOUS DAILY
Status: DISCONTINUED | OUTPATIENT
Start: 2024-11-26 | End: 2024-12-10 | Stop reason: HOSPADM

## 2024-11-26 RX ADMIN — THIAMINE HYDROCHLORIDE 100 MG: 100 INJECTION, SOLUTION INTRAMUSCULAR; INTRAVENOUS at 21:06

## 2024-11-26 RX ADMIN — ENOXAPARIN SODIUM 40 MG: 100 INJECTION SUBCUTANEOUS at 09:23

## 2024-11-26 RX ADMIN — POTASSIUM CHLORIDE 10 MEQ: 7.46 INJECTION, SOLUTION INTRAVENOUS at 06:26

## 2024-11-26 RX ADMIN — HYDROMORPHONE HYDROCHLORIDE 0.5 MG: 2 INJECTION, SOLUTION INTRAMUSCULAR; INTRAVENOUS; SUBCUTANEOUS at 06:21

## 2024-11-26 RX ADMIN — INSULIN LISPRO 1 UNITS: 100 INJECTION, SOLUTION INTRAVENOUS; SUBCUTANEOUS at 06:32

## 2024-11-26 RX ADMIN — SODIUM CHLORIDE, PRESERVATIVE FREE 10 ML: 5 INJECTION INTRAVENOUS at 09:15

## 2024-11-26 RX ADMIN — HYDROMORPHONE HYDROCHLORIDE 0.5 MG: 2 INJECTION, SOLUTION INTRAMUSCULAR; INTRAVENOUS; SUBCUTANEOUS at 15:20

## 2024-11-26 RX ADMIN — LEUCINE, PHENYLALANINE, LYSINE, METHIONINE, ISOLEUCINE, VALINE, HISTIDINE, THREONINE, TRYPTOPHAN, ALANINE, GLYCINE, ARGININE, PROLINE, SERINE, TYROSINE, SODIUM ACETATE, DIBASIC POTASSIUM PHOSPHATE, MAGNESIUM CHLORIDE, SODIUM CHLORIDE, CALCIUM CHLORIDE, DEXTROSE
1035; 575; 33; 20; 515; 240; 300; 365; 290; 51; 200; 280; 261; 340; 250; 340; 59; 210; 90; 20; 290 INJECTION INTRAVENOUS at 18:10

## 2024-11-26 RX ADMIN — PIPERACILLIN SODIUM AND TAZOBACTAM SODIUM 3375 MG: 3; .375 INJECTION, SOLUTION INTRAVENOUS at 05:09

## 2024-11-26 RX ADMIN — Medication 10 ML: at 21:15

## 2024-11-26 RX ADMIN — HYDROMORPHONE HYDROCHLORIDE 0.5 MG: 2 INJECTION, SOLUTION INTRAMUSCULAR; INTRAVENOUS; SUBCUTANEOUS at 23:59

## 2024-11-26 RX ADMIN — INSULIN LISPRO 1 UNITS: 100 INJECTION, SOLUTION INTRAVENOUS; SUBCUTANEOUS at 09:23

## 2024-11-26 RX ADMIN — INSULIN LISPRO 1 UNITS: 100 INJECTION, SOLUTION INTRAVENOUS; SUBCUTANEOUS at 01:57

## 2024-11-26 RX ADMIN — HYDROMORPHONE HYDROCHLORIDE 0.5 MG: 2 INJECTION, SOLUTION INTRAMUSCULAR; INTRAVENOUS; SUBCUTANEOUS at 11:49

## 2024-11-26 RX ADMIN — PIPERACILLIN SODIUM AND TAZOBACTAM SODIUM 3375 MG: 3; .375 INJECTION, SOLUTION INTRAVENOUS at 21:19

## 2024-11-26 RX ADMIN — HYDROMORPHONE HYDROCHLORIDE 0.5 MG: 2 INJECTION, SOLUTION INTRAMUSCULAR; INTRAVENOUS; SUBCUTANEOUS at 18:41

## 2024-11-26 RX ADMIN — Medication 10 ML: at 09:15

## 2024-11-26 RX ADMIN — PIPERACILLIN SODIUM AND TAZOBACTAM SODIUM 3375 MG: 3; .375 INJECTION, SOLUTION INTRAVENOUS at 14:30

## 2024-11-26 RX ADMIN — SODIUM CHLORIDE, PRESERVATIVE FREE 10 ML: 5 INJECTION INTRAVENOUS at 21:16

## 2024-11-26 RX ADMIN — POTASSIUM PHOSPHATE, MONOBASIC POTASSIUM PHOSPHATE, DIBASIC 16 MMOL: 224; 236 INJECTION, SOLUTION, CONCENTRATE INTRAVENOUS at 09:21

## 2024-11-26 RX ADMIN — SODIUM CHLORIDE, PRESERVATIVE FREE 10 ML: 5 INJECTION INTRAVENOUS at 21:15

## 2024-11-26 ASSESSMENT — PAIN SCALES - GENERAL
PAINLEVEL_OUTOF10: 10
PAINLEVEL_OUTOF10: 0
PAINLEVEL_OUTOF10: 5
PAINLEVEL_OUTOF10: 9
PAINLEVEL_OUTOF10: 5
PAINLEVEL_OUTOF10: 7
PAINLEVEL_OUTOF10: 8
PAINLEVEL_OUTOF10: 10
PAINLEVEL_OUTOF10: 9
PAINLEVEL_OUTOF10: 4
PAINLEVEL_OUTOF10: 4

## 2024-11-26 ASSESSMENT — PAIN DESCRIPTION - FREQUENCY: FREQUENCY: CONTINUOUS

## 2024-11-26 ASSESSMENT — PAIN DESCRIPTION - ORIENTATION
ORIENTATION: MID

## 2024-11-26 ASSESSMENT — PAIN DESCRIPTION - DESCRIPTORS
DESCRIPTORS: SORE

## 2024-11-26 ASSESSMENT — PAIN - FUNCTIONAL ASSESSMENT
PAIN_FUNCTIONAL_ASSESSMENT: ACTIVITIES ARE NOT PREVENTED

## 2024-11-26 ASSESSMENT — PAIN DESCRIPTION - LOCATION
LOCATION: ABDOMEN

## 2024-11-26 ASSESSMENT — PAIN DESCRIPTION - PAIN TYPE: TYPE: SURGICAL PAIN

## 2024-11-26 ASSESSMENT — PAIN DESCRIPTION - ONSET: ONSET: ON-GOING

## 2024-11-27 ENCOUNTER — APPOINTMENT (OUTPATIENT)
Dept: GENERAL RADIOLOGY | Age: 89
DRG: 329 | End: 2024-11-27
Payer: MEDICARE

## 2024-11-27 LAB
ANION GAP SERPL CALCULATED.3IONS-SCNC: 5 MMOL/L (ref 3–16)
BASOPHILS # BLD: 0.1 K/UL (ref 0–0.2)
BASOPHILS NFR BLD: 0.5 %
BUN SERPL-MCNC: 21 MG/DL (ref 7–20)
CALCIUM SERPL-MCNC: 10.7 MG/DL (ref 8.3–10.6)
CHLORIDE SERPL-SCNC: 110 MMOL/L (ref 99–110)
CO2 SERPL-SCNC: 32 MMOL/L (ref 21–32)
CREAT SERPL-MCNC: 0.9 MG/DL (ref 0.8–1.3)
DEPRECATED RDW RBC AUTO: 13.6 % (ref 12.4–15.4)
EOSINOPHIL # BLD: 0.2 K/UL (ref 0–0.6)
EOSINOPHIL NFR BLD: 2 %
GFR SERPLBLD CREATININE-BSD FMLA CKD-EPI: 82 ML/MIN/{1.73_M2}
GLUCOSE BLD-MCNC: 123 MG/DL (ref 70–99)
GLUCOSE BLD-MCNC: 146 MG/DL (ref 70–99)
GLUCOSE BLD-MCNC: 150 MG/DL (ref 70–99)
GLUCOSE BLD-MCNC: 163 MG/DL (ref 70–99)
GLUCOSE BLD-MCNC: 167 MG/DL (ref 70–99)
GLUCOSE BLD-MCNC: 178 MG/DL (ref 70–99)
GLUCOSE SERPL-MCNC: 190 MG/DL (ref 70–99)
HCT VFR BLD AUTO: 27.2 % (ref 40.5–52.5)
HGB BLD-MCNC: 9.3 G/DL (ref 13.5–17.5)
LYMPHOCYTES # BLD: 0.7 K/UL (ref 1–5.1)
LYMPHOCYTES NFR BLD: 7.1 %
MAGNESIUM SERPL-MCNC: 2.45 MG/DL (ref 1.8–2.4)
MCH RBC QN AUTO: 32.6 PG (ref 26–34)
MCHC RBC AUTO-ENTMCNC: 34.3 G/DL (ref 31–36)
MCV RBC AUTO: 94.9 FL (ref 80–100)
MONOCYTES # BLD: 0.9 K/UL (ref 0–1.3)
MONOCYTES NFR BLD: 9.1 %
NEUTROPHILS # BLD: 7.8 K/UL (ref 1.7–7.7)
NEUTROPHILS NFR BLD: 81.3 %
PERFORMED ON: ABNORMAL
PHOSPHATE SERPL-MCNC: 2.2 MG/DL (ref 2.5–4.9)
PLATELET # BLD AUTO: 424 K/UL (ref 135–450)
PMV BLD AUTO: 7.6 FL (ref 5–10.5)
POTASSIUM SERPL-SCNC: 3.5 MMOL/L (ref 3.5–5.1)
RBC # BLD AUTO: 2.86 M/UL (ref 4.2–5.9)
SODIUM SERPL-SCNC: 147 MMOL/L (ref 136–145)
TRIGL SERPL-MCNC: 46 MG/DL (ref 0–150)
WBC # BLD AUTO: 9.6 K/UL (ref 4–11)

## 2024-11-27 PROCEDURE — 1200000000 HC SEMI PRIVATE

## 2024-11-27 PROCEDURE — 80048 BASIC METABOLIC PNL TOTAL CA: CPT

## 2024-11-27 PROCEDURE — 2580000003 HC RX 258: Performed by: INTERNAL MEDICINE

## 2024-11-27 PROCEDURE — 97606 NEG PRS WND THER DME>50 SQCM: CPT

## 2024-11-27 PROCEDURE — 71045 X-RAY EXAM CHEST 1 VIEW: CPT

## 2024-11-27 PROCEDURE — 2580000003 HC RX 258: Performed by: SURGERY

## 2024-11-27 PROCEDURE — 99024 POSTOP FOLLOW-UP VISIT: CPT | Performed by: SURGERY

## 2024-11-27 PROCEDURE — 6360000002 HC RX W HCPCS: Performed by: INTERNAL MEDICINE

## 2024-11-27 PROCEDURE — 83735 ASSAY OF MAGNESIUM: CPT

## 2024-11-27 PROCEDURE — 2500000003 HC RX 250 WO HCPCS: Performed by: INTERNAL MEDICINE

## 2024-11-27 PROCEDURE — 84100 ASSAY OF PHOSPHORUS: CPT

## 2024-11-27 PROCEDURE — 84478 ASSAY OF TRIGLYCERIDES: CPT

## 2024-11-27 PROCEDURE — 6360000002 HC RX W HCPCS: Performed by: SURGERY

## 2024-11-27 PROCEDURE — 2500000003 HC RX 250 WO HCPCS: Performed by: SURGERY

## 2024-11-27 PROCEDURE — 85025 COMPLETE CBC W/AUTO DIFF WBC: CPT

## 2024-11-27 RX ORDER — DEXTROSE MONOHYDRATE 50 MG/ML
INJECTION, SOLUTION INTRAVENOUS CONTINUOUS
Status: DISCONTINUED | OUTPATIENT
Start: 2024-11-27 | End: 2024-11-28

## 2024-11-27 RX ADMIN — PIPERACILLIN SODIUM AND TAZOBACTAM SODIUM 3375 MG: 3; .375 INJECTION, SOLUTION INTRAVENOUS at 13:52

## 2024-11-27 RX ADMIN — HYDROMORPHONE HYDROCHLORIDE 0.25 MG: 2 INJECTION, SOLUTION INTRAMUSCULAR; INTRAVENOUS; SUBCUTANEOUS at 21:10

## 2024-11-27 RX ADMIN — ENOXAPARIN SODIUM 40 MG: 100 INJECTION SUBCUTANEOUS at 09:29

## 2024-11-27 RX ADMIN — THIAMINE HYDROCHLORIDE 100 MG: 100 INJECTION, SOLUTION INTRAMUSCULAR; INTRAVENOUS at 09:29

## 2024-11-27 RX ADMIN — PIPERACILLIN SODIUM AND TAZOBACTAM SODIUM 3375 MG: 3; .375 INJECTION, SOLUTION INTRAVENOUS at 05:45

## 2024-11-27 RX ADMIN — ASCORBIC ACID, VITAMIN A PALMITATE, CHOLECALCIFEROL, THIAMINE HYDROCHLORIDE, RIBOFLAVIN-5 PHOSPHATE SODIUM, PYRIDOXINE HYDROCHLORIDE, NIACINAMIDE, DEXPANTHENOL, ALPHA-TOCOPHEROL ACETATE, VITAMIN K1, FOLIC ACID, BIOTIN, CYANOCOBALAMIN: 200; 3300; 200; 6; 3.6; 6; 40; 15; 10; 150; 600; 60; 5 INJECTION, SOLUTION INTRAVENOUS at 18:06

## 2024-11-27 RX ADMIN — SODIUM CHLORIDE, PRESERVATIVE FREE 10 ML: 5 INJECTION INTRAVENOUS at 21:18

## 2024-11-27 RX ADMIN — HYDROMORPHONE HYDROCHLORIDE 0.5 MG: 2 INJECTION, SOLUTION INTRAMUSCULAR; INTRAVENOUS; SUBCUTANEOUS at 10:10

## 2024-11-27 RX ADMIN — Medication 10 ML: at 09:29

## 2024-11-27 RX ADMIN — DEXTROSE MONOHYDRATE: 50 INJECTION, SOLUTION INTRAVENOUS at 18:11

## 2024-11-27 RX ADMIN — POTASSIUM PHOSPHATE, MONOBASIC POTASSIUM PHOSPHATE, DIBASIC 20 MMOL: 224; 236 INJECTION, SOLUTION, CONCENTRATE INTRAVENOUS at 09:35

## 2024-11-27 RX ADMIN — PIPERACILLIN SODIUM AND TAZOBACTAM SODIUM 3375 MG: 3; .375 INJECTION, SOLUTION INTRAVENOUS at 21:57

## 2024-11-27 RX ADMIN — SODIUM CHLORIDE, PRESERVATIVE FREE 10 ML: 5 INJECTION INTRAVENOUS at 09:29

## 2024-11-27 RX ADMIN — POTASSIUM PHOSPHATE, MONOBASIC AND POTASSIUM PHOSPHATE, DIBASIC 20 MMOL: 224; 236 INJECTION, SOLUTION, CONCENTRATE INTRAVENOUS at 17:17

## 2024-11-27 RX ADMIN — SODIUM CHLORIDE 125 MG: 9 INJECTION, SOLUTION INTRAVENOUS at 09:51

## 2024-11-27 ASSESSMENT — PAIN DESCRIPTION - LOCATION
LOCATION: ABDOMEN

## 2024-11-27 ASSESSMENT — PAIN SCALES - GENERAL
PAINLEVEL_OUTOF10: 5
PAINLEVEL_OUTOF10: 5
PAINLEVEL_OUTOF10: 1
PAINLEVEL_OUTOF10: 0
PAINLEVEL_OUTOF10: 0
PAINLEVEL_OUTOF10: 7

## 2024-11-27 ASSESSMENT — PAIN DESCRIPTION - FREQUENCY: FREQUENCY: INTERMITTENT

## 2024-11-27 ASSESSMENT — PAIN DESCRIPTION - PAIN TYPE
TYPE: SURGICAL PAIN
TYPE: SURGICAL PAIN

## 2024-11-27 ASSESSMENT — PAIN DESCRIPTION - DESCRIPTORS
DESCRIPTORS: SORE
DESCRIPTORS: ACHING;SORE

## 2024-11-27 ASSESSMENT — PAIN DESCRIPTION - ONSET: ONSET: ON-GOING

## 2024-11-28 LAB
ANION GAP SERPL CALCULATED.3IONS-SCNC: 6 MMOL/L (ref 3–16)
BASOPHILS # BLD: 0.1 K/UL (ref 0–0.2)
BASOPHILS NFR BLD: 0.7 %
BUN SERPL-MCNC: 20 MG/DL (ref 7–20)
CALCIUM SERPL-MCNC: 10.2 MG/DL (ref 8.3–10.6)
CHLORIDE SERPL-SCNC: 111 MMOL/L (ref 99–110)
CO2 SERPL-SCNC: 32 MMOL/L (ref 21–32)
CREAT SERPL-MCNC: 1 MG/DL (ref 0.8–1.3)
DEPRECATED RDW RBC AUTO: 13.7 % (ref 12.4–15.4)
EOSINOPHIL # BLD: 0.4 K/UL (ref 0–0.6)
EOSINOPHIL NFR BLD: 5.6 %
GFR SERPLBLD CREATININE-BSD FMLA CKD-EPI: 72 ML/MIN/{1.73_M2}
GLUCOSE BLD-MCNC: 154 MG/DL (ref 70–99)
GLUCOSE BLD-MCNC: 159 MG/DL (ref 70–99)
GLUCOSE BLD-MCNC: 178 MG/DL (ref 70–99)
GLUCOSE BLD-MCNC: 185 MG/DL (ref 70–99)
GLUCOSE BLD-MCNC: 201 MG/DL (ref 70–99)
GLUCOSE BLD-MCNC: 253 MG/DL (ref 70–99)
GLUCOSE SERPL-MCNC: 191 MG/DL (ref 70–99)
HCT VFR BLD AUTO: 24.6 % (ref 40.5–52.5)
HGB BLD-MCNC: 8 G/DL (ref 13.5–17.5)
LYMPHOCYTES # BLD: 0.6 K/UL (ref 1–5.1)
LYMPHOCYTES NFR BLD: 8.7 %
MAGNESIUM SERPL-MCNC: 2.45 MG/DL (ref 1.8–2.4)
MCH RBC QN AUTO: 30.6 PG (ref 26–34)
MCHC RBC AUTO-ENTMCNC: 32.6 G/DL (ref 31–36)
MCV RBC AUTO: 93.8 FL (ref 80–100)
MONOCYTES # BLD: 0.5 K/UL (ref 0–1.3)
MONOCYTES NFR BLD: 7.4 %
NEUTROPHILS # BLD: 5.5 K/UL (ref 1.7–7.7)
NEUTROPHILS NFR BLD: 77.6 %
PERFORMED ON: ABNORMAL
PHOSPHATE SERPL-MCNC: 3.3 MG/DL (ref 2.5–4.9)
PLATELET # BLD AUTO: 326 K/UL (ref 135–450)
PMV BLD AUTO: 7.8 FL (ref 5–10.5)
POTASSIUM SERPL-SCNC: 3.1 MMOL/L (ref 3.5–5.1)
RBC # BLD AUTO: 2.63 M/UL (ref 4.2–5.9)
SODIUM SERPL-SCNC: 149 MMOL/L (ref 136–145)
WBC # BLD AUTO: 7.1 K/UL (ref 4–11)

## 2024-11-28 PROCEDURE — 80048 BASIC METABOLIC PNL TOTAL CA: CPT

## 2024-11-28 PROCEDURE — 2580000003 HC RX 258: Performed by: SURGERY

## 2024-11-28 PROCEDURE — 2580000003 HC RX 258: Performed by: INTERNAL MEDICINE

## 2024-11-28 PROCEDURE — 6360000002 HC RX W HCPCS: Performed by: SURGERY

## 2024-11-28 PROCEDURE — 97530 THERAPEUTIC ACTIVITIES: CPT

## 2024-11-28 PROCEDURE — 97165 OT EVAL LOW COMPLEX 30 MIN: CPT

## 2024-11-28 PROCEDURE — 97116 GAIT TRAINING THERAPY: CPT

## 2024-11-28 PROCEDURE — 6360000002 HC RX W HCPCS: Performed by: INTERNAL MEDICINE

## 2024-11-28 PROCEDURE — 6370000000 HC RX 637 (ALT 250 FOR IP): Performed by: SURGERY

## 2024-11-28 PROCEDURE — 85025 COMPLETE CBC W/AUTO DIFF WBC: CPT

## 2024-11-28 PROCEDURE — 99024 POSTOP FOLLOW-UP VISIT: CPT | Performed by: SURGERY

## 2024-11-28 PROCEDURE — 97161 PT EVAL LOW COMPLEX 20 MIN: CPT

## 2024-11-28 PROCEDURE — 84100 ASSAY OF PHOSPHORUS: CPT

## 2024-11-28 PROCEDURE — 1200000000 HC SEMI PRIVATE

## 2024-11-28 PROCEDURE — 83735 ASSAY OF MAGNESIUM: CPT

## 2024-11-28 PROCEDURE — 2500000003 HC RX 250 WO HCPCS: Performed by: INTERNAL MEDICINE

## 2024-11-28 PROCEDURE — 97535 SELF CARE MNGMENT TRAINING: CPT

## 2024-11-28 RX ORDER — DEXTROSE MONOHYDRATE 50 MG/ML
INJECTION, SOLUTION INTRAVENOUS CONTINUOUS
Status: ACTIVE | OUTPATIENT
Start: 2024-11-28 | End: 2024-11-29

## 2024-11-28 RX ORDER — POTASSIUM CHLORIDE 7.45 MG/ML
10 INJECTION INTRAVENOUS
Status: COMPLETED | OUTPATIENT
Start: 2024-11-28 | End: 2024-11-28

## 2024-11-28 RX ORDER — POTASSIUM CHLORIDE 7.45 MG/ML
10 INJECTION INTRAVENOUS
Status: DISCONTINUED | OUTPATIENT
Start: 2024-11-28 | End: 2024-11-28

## 2024-11-28 RX ADMIN — POTASSIUM CHLORIDE 10 MEQ: 7.46 INJECTION, SOLUTION INTRAVENOUS at 13:12

## 2024-11-28 RX ADMIN — SODIUM CHLORIDE 125 MG: 9 INJECTION, SOLUTION INTRAVENOUS at 09:36

## 2024-11-28 RX ADMIN — THIAMINE HYDROCHLORIDE 100 MG: 100 INJECTION, SOLUTION INTRAMUSCULAR; INTRAVENOUS at 09:03

## 2024-11-28 RX ADMIN — POTASSIUM CHLORIDE 10 MEQ: 7.46 INJECTION, SOLUTION INTRAVENOUS at 15:31

## 2024-11-28 RX ADMIN — INSULIN LISPRO 2 UNITS: 100 INJECTION, SOLUTION INTRAVENOUS; SUBCUTANEOUS at 17:53

## 2024-11-28 RX ADMIN — I.V. FAT EMULSION 250 ML: 20 EMULSION INTRAVENOUS at 18:03

## 2024-11-28 RX ADMIN — Medication 10 ML: at 09:04

## 2024-11-28 RX ADMIN — SODIUM CHLORIDE, PRESERVATIVE FREE 10 ML: 5 INJECTION INTRAVENOUS at 09:04

## 2024-11-28 RX ADMIN — POTASSIUM CHLORIDE 10 MEQ: 7.46 INJECTION, SOLUTION INTRAVENOUS at 11:04

## 2024-11-28 RX ADMIN — PIPERACILLIN SODIUM AND TAZOBACTAM SODIUM 3375 MG: 3; .375 INJECTION, SOLUTION INTRAVENOUS at 16:36

## 2024-11-28 RX ADMIN — Medication 10 ML: at 03:42

## 2024-11-28 RX ADMIN — ENOXAPARIN SODIUM 40 MG: 100 INJECTION SUBCUTANEOUS at 09:03

## 2024-11-28 RX ADMIN — POTASSIUM CHLORIDE 10 MEQ: 7.46 INJECTION, SOLUTION INTRAVENOUS at 13:11

## 2024-11-28 RX ADMIN — PIPERACILLIN SODIUM AND TAZOBACTAM SODIUM 3375 MG: 3; .375 INJECTION, SOLUTION INTRAVENOUS at 05:06

## 2024-11-28 RX ADMIN — PIPERACILLIN SODIUM AND TAZOBACTAM SODIUM 3375 MG: 3; .375 INJECTION, SOLUTION INTRAVENOUS at 21:42

## 2024-11-28 RX ADMIN — LEUCINE, PHENYLALANINE, LYSINE, METHIONINE, ISOLEUCINE, VALINE, HISTIDINE, THREONINE, TRYPTOPHAN, ALANINE, GLYCINE, ARGININE, PROLINE, SERINE, TYROSINE, SODIUM ACETATE, DIBASIC POTASSIUM PHOSPHATE, MAGNESIUM CHLORIDE, SODIUM CHLORIDE, CALCIUM CHLORIDE, DEXTROSE
1035; 575; 33; 20; 515; 240; 300; 365; 290; 51; 200; 280; 261; 340; 250; 340; 59; 210; 90; 20; 290 INJECTION INTRAVENOUS at 18:04

## 2024-11-28 RX ADMIN — SODIUM CHLORIDE, PRESERVATIVE FREE 20 ML: 5 INJECTION INTRAVENOUS at 09:04

## 2024-11-28 RX ADMIN — DEXTROSE MONOHYDRATE: 50 INJECTION, SOLUTION INTRAVENOUS at 11:02

## 2024-11-28 RX ADMIN — HYDROMORPHONE HYDROCHLORIDE 0.25 MG: 2 INJECTION, SOLUTION INTRAMUSCULAR; INTRAVENOUS; SUBCUTANEOUS at 03:42

## 2024-11-28 ASSESSMENT — PAIN SCALES - GENERAL
PAINLEVEL_OUTOF10: 1
PAINLEVEL_OUTOF10: 5

## 2024-11-29 LAB
ANION GAP SERPL CALCULATED.3IONS-SCNC: 5 MMOL/L (ref 3–16)
ANION GAP SERPL CALCULATED.3IONS-SCNC: 6 MMOL/L (ref 3–16)
ANION GAP SERPL CALCULATED.3IONS-SCNC: 6 MMOL/L (ref 3–16)
ANION GAP SERPL CALCULATED.3IONS-SCNC: ABNORMAL MMOL/L (ref 3–16)
BASOPHILS # BLD: 0.1 K/UL (ref 0–0.2)
BASOPHILS NFR BLD: 0.8 %
BUN SERPL-MCNC: 19 MG/DL (ref 7–20)
BUN SERPL-MCNC: 20 MG/DL (ref 7–20)
BUN SERPL-MCNC: 20 MG/DL (ref 7–20)
BUN SERPL-MCNC: ABNORMAL MG/DL (ref 7–20)
CALCIUM SERPL-MCNC: 10.4 MG/DL (ref 8.3–10.6)
CALCIUM SERPL-MCNC: 8.9 MG/DL (ref 8.3–10.6)
CALCIUM SERPL-MCNC: 9.1 MG/DL (ref 8.3–10.6)
CALCIUM SERPL-MCNC: ABNORMAL MG/DL (ref 8.3–10.6)
CHLORIDE SERPL-SCNC: 109 MMOL/L (ref 99–110)
CHLORIDE SERPL-SCNC: 110 MMOL/L (ref 99–110)
CHLORIDE SERPL-SCNC: 111 MMOL/L (ref 99–110)
CHLORIDE SERPL-SCNC: ABNORMAL MMOL/L (ref 99–110)
CO2 SERPL-SCNC: 31 MMOL/L (ref 21–32)
CO2 SERPL-SCNC: 32 MMOL/L (ref 21–32)
CO2 SERPL-SCNC: 33 MMOL/L (ref 21–32)
CO2 SERPL-SCNC: ABNORMAL MMOL/L (ref 21–32)
CREAT SERPL-MCNC: 0.9 MG/DL (ref 0.8–1.3)
CREAT SERPL-MCNC: ABNORMAL MG/DL (ref 0.8–1.3)
DEPRECATED RDW RBC AUTO: 13.6 % (ref 12.4–15.4)
EOSINOPHIL # BLD: 0.7 K/UL (ref 0–0.6)
EOSINOPHIL NFR BLD: 9.1 %
GFR SERPLBLD CREATININE-BSD FMLA CKD-EPI: 82 ML/MIN/{1.73_M2}
GFR SERPLBLD CREATININE-BSD FMLA CKD-EPI: ABNORMAL ML/MIN/{1.73_M2}
GLUCOSE BLD-MCNC: 110 MG/DL (ref 70–99)
GLUCOSE BLD-MCNC: 120 MG/DL (ref 70–99)
GLUCOSE BLD-MCNC: 164 MG/DL (ref 70–99)
GLUCOSE BLD-MCNC: 171 MG/DL (ref 70–99)
GLUCOSE BLD-MCNC: 181 MG/DL (ref 70–99)
GLUCOSE BLD-MCNC: 184 MG/DL (ref 70–99)
GLUCOSE BLD-MCNC: 217 MG/DL (ref 70–99)
GLUCOSE SERPL-MCNC: 158 MG/DL (ref 70–99)
GLUCOSE SERPL-MCNC: 176 MG/DL (ref 70–99)
GLUCOSE SERPL-MCNC: 86 MG/DL (ref 70–99)
GLUCOSE SERPL-MCNC: ABNORMAL MG/DL (ref 70–99)
HCT VFR BLD AUTO: 23.2 % (ref 40.5–52.5)
HGB BLD-MCNC: 7.9 G/DL (ref 13.5–17.5)
LYMPHOCYTES # BLD: 0.8 K/UL (ref 1–5.1)
LYMPHOCYTES NFR BLD: 10.5 %
MAGNESIUM SERPL-MCNC: 2.44 MG/DL (ref 1.8–2.4)
MCH RBC QN AUTO: 31.9 PG (ref 26–34)
MCHC RBC AUTO-ENTMCNC: 34 G/DL (ref 31–36)
MCV RBC AUTO: 94 FL (ref 80–100)
MONOCYTES # BLD: 0.5 K/UL (ref 0–1.3)
MONOCYTES NFR BLD: 5.9 %
NEUTROPHILS # BLD: 5.7 K/UL (ref 1.7–7.7)
NEUTROPHILS NFR BLD: 73.7 %
PERFORMED ON: ABNORMAL
PHOSPHATE SERPL-MCNC: 2.2 MG/DL (ref 2.5–4.9)
PLATELET # BLD AUTO: 303 K/UL (ref 135–450)
PMV BLD AUTO: 7.8 FL (ref 5–10.5)
POTASSIUM SERPL-SCNC: 3.2 MMOL/L (ref 3.5–5.1)
POTASSIUM SERPL-SCNC: 3.3 MMOL/L (ref 3.5–5.1)
POTASSIUM SERPL-SCNC: 3.5 MMOL/L (ref 3.5–5.1)
POTASSIUM SERPL-SCNC: ABNORMAL MMOL/L (ref 3.5–5.1)
RBC # BLD AUTO: 2.47 M/UL (ref 4.2–5.9)
SODIUM SERPL-SCNC: 146 MMOL/L (ref 136–145)
SODIUM SERPL-SCNC: 148 MMOL/L (ref 136–145)
SODIUM SERPL-SCNC: 149 MMOL/L (ref 136–145)
SODIUM SERPL-SCNC: ABNORMAL MMOL/L (ref 136–145)
WBC # BLD AUTO: 7.7 K/UL (ref 4–11)

## 2024-11-29 PROCEDURE — 6360000002 HC RX W HCPCS: Performed by: INTERNAL MEDICINE

## 2024-11-29 PROCEDURE — 6370000000 HC RX 637 (ALT 250 FOR IP): Performed by: SURGERY

## 2024-11-29 PROCEDURE — 6360000002 HC RX W HCPCS: Performed by: SURGERY

## 2024-11-29 PROCEDURE — 2580000003 HC RX 258: Performed by: SURGERY

## 2024-11-29 PROCEDURE — 1200000000 HC SEMI PRIVATE

## 2024-11-29 PROCEDURE — 80048 BASIC METABOLIC PNL TOTAL CA: CPT

## 2024-11-29 PROCEDURE — 92526 ORAL FUNCTION THERAPY: CPT

## 2024-11-29 PROCEDURE — 85025 COMPLETE CBC W/AUTO DIFF WBC: CPT

## 2024-11-29 PROCEDURE — 6360000002 HC RX W HCPCS: Performed by: NURSE PRACTITIONER

## 2024-11-29 PROCEDURE — 92610 EVALUATE SWALLOWING FUNCTION: CPT

## 2024-11-29 PROCEDURE — 2500000003 HC RX 250 WO HCPCS: Performed by: INTERNAL MEDICINE

## 2024-11-29 PROCEDURE — 99024 POSTOP FOLLOW-UP VISIT: CPT | Performed by: SURGERY

## 2024-11-29 PROCEDURE — 83735 ASSAY OF MAGNESIUM: CPT

## 2024-11-29 PROCEDURE — 2580000003 HC RX 258: Performed by: INTERNAL MEDICINE

## 2024-11-29 PROCEDURE — 84100 ASSAY OF PHOSPHORUS: CPT

## 2024-11-29 RX ORDER — POTASSIUM CHLORIDE 7.45 MG/ML
10 INJECTION INTRAVENOUS
Status: COMPLETED | OUTPATIENT
Start: 2024-11-29 | End: 2024-11-29

## 2024-11-29 RX ADMIN — HYDROMORPHONE HYDROCHLORIDE 0.25 MG: 2 INJECTION, SOLUTION INTRAMUSCULAR; INTRAVENOUS; SUBCUTANEOUS at 16:26

## 2024-11-29 RX ADMIN — HYDROMORPHONE HYDROCHLORIDE 0.5 MG: 2 INJECTION, SOLUTION INTRAMUSCULAR; INTRAVENOUS; SUBCUTANEOUS at 23:40

## 2024-11-29 RX ADMIN — ENOXAPARIN SODIUM 40 MG: 100 INJECTION SUBCUTANEOUS at 09:15

## 2024-11-29 RX ADMIN — SODIUM CHLORIDE, PRESERVATIVE FREE 10 ML: 5 INJECTION INTRAVENOUS at 09:16

## 2024-11-29 RX ADMIN — ASCORBIC ACID, VITAMIN A PALMITATE, CHOLECALCIFEROL, THIAMINE HYDROCHLORIDE, RIBOFLAVIN-5 PHOSPHATE SODIUM, PYRIDOXINE HYDROCHLORIDE, NIACINAMIDE, DEXPANTHENOL, ALPHA-TOCOPHEROL ACETATE, VITAMIN K1, FOLIC ACID, BIOTIN, CYANOCOBALAMIN: 200; 3300; 200; 6; 3.6; 6; 40; 15; 10; 150; 600; 60; 5 INJECTION, SOLUTION INTRAVENOUS at 18:29

## 2024-11-29 RX ADMIN — SODIUM CHLORIDE, PRESERVATIVE FREE 10 ML: 5 INJECTION INTRAVENOUS at 20:46

## 2024-11-29 RX ADMIN — PIPERACILLIN SODIUM AND TAZOBACTAM SODIUM 3375 MG: 3; .375 INJECTION, SOLUTION INTRAVENOUS at 20:56

## 2024-11-29 RX ADMIN — HYDROMORPHONE HYDROCHLORIDE 0.5 MG: 2 INJECTION, SOLUTION INTRAMUSCULAR; INTRAVENOUS; SUBCUTANEOUS at 00:10

## 2024-11-29 RX ADMIN — PIPERACILLIN SODIUM AND TAZOBACTAM SODIUM 3375 MG: 3; .375 INJECTION, SOLUTION INTRAVENOUS at 06:03

## 2024-11-29 RX ADMIN — POTASSIUM PHOSPHATE, MONOBASIC AND POTASSIUM PHOSPHATE, DIBASIC 20 MMOL: 224; 236 INJECTION, SOLUTION, CONCENTRATE INTRAVENOUS at 11:34

## 2024-11-29 RX ADMIN — HYDROMORPHONE HYDROCHLORIDE 0.5 MG: 2 INJECTION, SOLUTION INTRAMUSCULAR; INTRAVENOUS; SUBCUTANEOUS at 11:49

## 2024-11-29 RX ADMIN — Medication 10 ML: at 09:16

## 2024-11-29 RX ADMIN — INSULIN LISPRO 1 UNITS: 100 INJECTION, SOLUTION INTRAVENOUS; SUBCUTANEOUS at 16:24

## 2024-11-29 RX ADMIN — POTASSIUM CHLORIDE 10 MEQ: 7.46 INJECTION, SOLUTION INTRAVENOUS at 09:14

## 2024-11-29 RX ADMIN — PIPERACILLIN SODIUM AND TAZOBACTAM SODIUM 3375 MG: 3; .375 INJECTION, SOLUTION INTRAVENOUS at 12:59

## 2024-11-29 RX ADMIN — THIAMINE HYDROCHLORIDE 100 MG: 100 INJECTION, SOLUTION INTRAMUSCULAR; INTRAVENOUS at 09:15

## 2024-11-29 RX ADMIN — POTASSIUM CHLORIDE 10 MEQ: 7.46 INJECTION, SOLUTION INTRAVENOUS at 10:14

## 2024-11-29 RX ADMIN — INSULIN LISPRO 1 UNITS: 100 INJECTION, SOLUTION INTRAVENOUS; SUBCUTANEOUS at 02:54

## 2024-11-29 RX ADMIN — ALTEPLASE 2 MG: 2.2 INJECTION, POWDER, LYOPHILIZED, FOR SOLUTION INTRAVENOUS at 23:33

## 2024-11-29 ASSESSMENT — PAIN DESCRIPTION - LOCATION
LOCATION: ABDOMEN

## 2024-11-29 ASSESSMENT — PAIN DESCRIPTION - DESCRIPTORS: DESCRIPTORS: SHARP

## 2024-11-29 ASSESSMENT — PAIN SCALES - WONG BAKER
WONGBAKER_NUMERICALRESPONSE: NO HURT
WONGBAKER_NUMERICALRESPONSE: HURTS A LITTLE BIT

## 2024-11-29 ASSESSMENT — PAIN SCALES - GENERAL
PAINLEVEL_OUTOF10: 6
PAINLEVEL_OUTOF10: 5
PAINLEVEL_OUTOF10: 8
PAINLEVEL_OUTOF10: 5

## 2024-11-29 ASSESSMENT — PAIN DESCRIPTION - PAIN TYPE: TYPE: SURGICAL PAIN

## 2024-11-29 ASSESSMENT — PAIN DESCRIPTION - ORIENTATION: ORIENTATION: MID

## 2024-11-30 LAB
ANION GAP SERPL CALCULATED.3IONS-SCNC: 6 MMOL/L (ref 3–16)
BACTERIA BLD CULT ORG #2: NORMAL
BACTERIA BLD CULT: NORMAL
BASOPHILS # BLD: 0.1 K/UL (ref 0–0.2)
BASOPHILS NFR BLD: 1.2 %
BUN SERPL-MCNC: 21 MG/DL (ref 7–20)
CALCIUM SERPL-MCNC: 10.6 MG/DL (ref 8.3–10.6)
CHLORIDE SERPL-SCNC: 111 MMOL/L (ref 99–110)
CO2 SERPL-SCNC: 34 MMOL/L (ref 21–32)
CREAT SERPL-MCNC: 1.1 MG/DL (ref 0.8–1.3)
DEPRECATED RDW RBC AUTO: 13.3 % (ref 12.4–15.4)
EOSINOPHIL # BLD: 0.6 K/UL (ref 0–0.6)
EOSINOPHIL NFR BLD: 6.4 %
GFR SERPLBLD CREATININE-BSD FMLA CKD-EPI: 65 ML/MIN/{1.73_M2}
GLUCOSE BLD-MCNC: 151 MG/DL (ref 70–99)
GLUCOSE BLD-MCNC: 176 MG/DL (ref 70–99)
GLUCOSE BLD-MCNC: 196 MG/DL (ref 70–99)
GLUCOSE BLD-MCNC: 278 MG/DL (ref 70–99)
GLUCOSE SERPL-MCNC: 182 MG/DL (ref 70–99)
HCT VFR BLD AUTO: 24.2 % (ref 40.5–52.5)
HGB BLD-MCNC: 8.2 G/DL (ref 13.5–17.5)
LYMPHOCYTES # BLD: 0.6 K/UL (ref 1–5.1)
LYMPHOCYTES NFR BLD: 7.3 %
MAGNESIUM SERPL-MCNC: 2.57 MG/DL (ref 1.8–2.4)
MCH RBC QN AUTO: 31.9 PG (ref 26–34)
MCHC RBC AUTO-ENTMCNC: 33.9 G/DL (ref 31–36)
MCV RBC AUTO: 94.1 FL (ref 80–100)
MONOCYTES # BLD: 0.5 K/UL (ref 0–1.3)
MONOCYTES NFR BLD: 5.2 %
NEUTROPHILS # BLD: 7 K/UL (ref 1.7–7.7)
NEUTROPHILS NFR BLD: 79.9 %
PERFORMED ON: ABNORMAL
PHOSPHATE SERPL-MCNC: 2.6 MG/DL (ref 2.5–4.9)
PLATELET # BLD AUTO: 293 K/UL (ref 135–450)
PMV BLD AUTO: 7.9 FL (ref 5–10.5)
POTASSIUM SERPL-SCNC: 3.4 MMOL/L (ref 3.5–5.1)
RBC # BLD AUTO: 2.57 M/UL (ref 4.2–5.9)
SODIUM SERPL-SCNC: 151 MMOL/L (ref 136–145)
WBC # BLD AUTO: 8.8 K/UL (ref 4–11)

## 2024-11-30 PROCEDURE — 6370000000 HC RX 637 (ALT 250 FOR IP): Performed by: SURGERY

## 2024-11-30 PROCEDURE — 83735 ASSAY OF MAGNESIUM: CPT

## 2024-11-30 PROCEDURE — 6360000002 HC RX W HCPCS: Performed by: INTERNAL MEDICINE

## 2024-11-30 PROCEDURE — 36415 COLL VENOUS BLD VENIPUNCTURE: CPT

## 2024-11-30 PROCEDURE — 87040 BLOOD CULTURE FOR BACTERIA: CPT

## 2024-11-30 PROCEDURE — 2500000003 HC RX 250 WO HCPCS: Performed by: STUDENT IN AN ORGANIZED HEALTH CARE EDUCATION/TRAINING PROGRAM

## 2024-11-30 PROCEDURE — 6360000002 HC RX W HCPCS: Performed by: SURGERY

## 2024-11-30 PROCEDURE — 6360000002 HC RX W HCPCS: Performed by: STUDENT IN AN ORGANIZED HEALTH CARE EDUCATION/TRAINING PROGRAM

## 2024-11-30 PROCEDURE — 99024 POSTOP FOLLOW-UP VISIT: CPT | Performed by: SURGERY

## 2024-11-30 PROCEDURE — 2580000003 HC RX 258: Performed by: SURGERY

## 2024-11-30 PROCEDURE — 80048 BASIC METABOLIC PNL TOTAL CA: CPT

## 2024-11-30 PROCEDURE — 1200000000 HC SEMI PRIVATE

## 2024-11-30 PROCEDURE — 84100 ASSAY OF PHOSPHORUS: CPT

## 2024-11-30 PROCEDURE — 85025 COMPLETE CBC W/AUTO DIFF WBC: CPT

## 2024-11-30 RX ORDER — PANTOPRAZOLE SODIUM 40 MG/10ML
40 INJECTION, POWDER, LYOPHILIZED, FOR SOLUTION INTRAVENOUS DAILY
Status: DISCONTINUED | OUTPATIENT
Start: 2024-11-30 | End: 2024-12-09

## 2024-11-30 RX ORDER — DEXTROSE MONOHYDRATE 50 MG/ML
INJECTION, SOLUTION INTRAVENOUS CONTINUOUS
Status: DISCONTINUED | OUTPATIENT
Start: 2024-11-30 | End: 2024-11-30

## 2024-11-30 RX ORDER — POTASSIUM CHLORIDE 7.45 MG/ML
10 INJECTION INTRAVENOUS
Status: COMPLETED | OUTPATIENT
Start: 2024-11-30 | End: 2024-11-30

## 2024-11-30 RX ADMIN — SODIUM CHLORIDE, PRESERVATIVE FREE 10 ML: 5 INJECTION INTRAVENOUS at 10:36

## 2024-11-30 RX ADMIN — THIAMINE HYDROCHLORIDE 100 MG: 100 INJECTION, SOLUTION INTRAMUSCULAR; INTRAVENOUS at 10:40

## 2024-11-30 RX ADMIN — ENOXAPARIN SODIUM 40 MG: 100 INJECTION SUBCUTANEOUS at 10:35

## 2024-11-30 RX ADMIN — PIPERACILLIN SODIUM AND TAZOBACTAM SODIUM 3375 MG: 3; .375 INJECTION, SOLUTION INTRAVENOUS at 06:03

## 2024-11-30 RX ADMIN — POTASSIUM CHLORIDE 10 MEQ: 7.46 INJECTION, SOLUTION INTRAVENOUS at 15:15

## 2024-11-30 RX ADMIN — Medication 10 ML: at 10:36

## 2024-11-30 RX ADMIN — PANTOPRAZOLE SODIUM 40 MG: 40 INJECTION, POWDER, FOR SOLUTION INTRAVENOUS at 10:40

## 2024-11-30 RX ADMIN — POTASSIUM CHLORIDE 10 MEQ: 7.46 INJECTION, SOLUTION INTRAVENOUS at 15:13

## 2024-11-30 RX ADMIN — POTASSIUM CHLORIDE 10 MEQ: 7.46 INJECTION, SOLUTION INTRAVENOUS at 12:52

## 2024-11-30 RX ADMIN — VENLAFAXINE HYDROCHLORIDE 150 MG: 150 CAPSULE, EXTENDED RELEASE ORAL at 10:35

## 2024-11-30 RX ADMIN — HYDROMORPHONE HYDROCHLORIDE 0.5 MG: 2 INJECTION, SOLUTION INTRAMUSCULAR; INTRAVENOUS; SUBCUTANEOUS at 12:27

## 2024-11-30 RX ADMIN — POTASSIUM CHLORIDE 10 MEQ: 7.46 INJECTION, SOLUTION INTRAVENOUS at 14:04

## 2024-11-30 RX ADMIN — POTASSIUM CHLORIDE 10 MEQ: 7.46 INJECTION, SOLUTION INTRAVENOUS at 16:16

## 2024-11-30 RX ADMIN — LEUCINE, PHENYLALANINE, LYSINE, METHIONINE, ISOLEUCINE, VALINE, HISTIDINE, THREONINE, TRYPTOPHAN, ALANINE, GLYCINE, ARGININE, PROLINE, SERINE, TYROSINE, SODIUM ACETATE, DIBASIC POTASSIUM PHOSPHATE, MAGNESIUM CHLORIDE, SODIUM CHLORIDE, CALCIUM CHLORIDE, DEXTROSE
1035; 575; 33; 20; 515; 240; 300; 365; 290; 51; 200; 280; 261; 340; 250; 340; 59; 210; 90; 20; 290 INJECTION INTRAVENOUS at 18:07

## 2024-11-30 RX ADMIN — INSULIN LISPRO 2 UNITS: 100 INJECTION, SOLUTION INTRAVENOUS; SUBCUTANEOUS at 17:24

## 2024-11-30 RX ADMIN — POTASSIUM CHLORIDE 10 MEQ: 7.46 INJECTION, SOLUTION INTRAVENOUS at 17:24

## 2024-11-30 RX ADMIN — INSULIN LISPRO 2 UNITS: 100 INJECTION, SOLUTION INTRAVENOUS; SUBCUTANEOUS at 12:27

## 2024-11-30 ASSESSMENT — PAIN - FUNCTIONAL ASSESSMENT: PAIN_FUNCTIONAL_ASSESSMENT: ACTIVITIES ARE NOT PREVENTED

## 2024-11-30 ASSESSMENT — PAIN DESCRIPTION - DESCRIPTORS: DESCRIPTORS: ACHING

## 2024-11-30 ASSESSMENT — PAIN DESCRIPTION - FREQUENCY: FREQUENCY: INTERMITTENT

## 2024-11-30 ASSESSMENT — PAIN DESCRIPTION - PAIN TYPE: TYPE: SURGICAL PAIN

## 2024-11-30 ASSESSMENT — PAIN SCALES - GENERAL
PAINLEVEL_OUTOF10: 2
PAINLEVEL_OUTOF10: 7

## 2024-11-30 ASSESSMENT — PAIN DESCRIPTION - ONSET: ONSET: ON-GOING

## 2024-11-30 ASSESSMENT — PAIN DESCRIPTION - LOCATION: LOCATION: ABDOMEN

## 2024-12-01 PROBLEM — K63.2 ENTEROCUTANEOUS FISTULA: Status: ACTIVE | Noted: 2024-12-01

## 2024-12-01 LAB
ANION GAP SERPL CALCULATED.3IONS-SCNC: 4 MMOL/L (ref 3–16)
BUN SERPL-MCNC: 23 MG/DL (ref 7–20)
C DIFF TOX A+B STL QL IA: NORMAL
CALCIUM SERPL-MCNC: 10.6 MG/DL (ref 8.3–10.6)
CHLORIDE SERPL-SCNC: 111 MMOL/L (ref 99–110)
CO2 SERPL-SCNC: 31 MMOL/L (ref 21–32)
CREAT SERPL-MCNC: 0.9 MG/DL (ref 0.8–1.3)
GFR SERPLBLD CREATININE-BSD FMLA CKD-EPI: 82 ML/MIN/{1.73_M2}
GLUCOSE BLD-MCNC: 150 MG/DL (ref 70–99)
GLUCOSE BLD-MCNC: 163 MG/DL (ref 70–99)
GLUCOSE BLD-MCNC: 197 MG/DL (ref 70–99)
GLUCOSE BLD-MCNC: 203 MG/DL (ref 70–99)
GLUCOSE BLD-MCNC: 206 MG/DL (ref 70–99)
GLUCOSE SERPL-MCNC: 201 MG/DL (ref 70–99)
MAGNESIUM SERPL-MCNC: 2.51 MG/DL (ref 1.8–2.4)
PERFORMED ON: ABNORMAL
PHOSPHATE SERPL-MCNC: 2.3 MG/DL (ref 2.5–4.9)
POTASSIUM SERPL-SCNC: 3.8 MMOL/L (ref 3.5–5.1)
SODIUM SERPL-SCNC: 146 MMOL/L (ref 136–145)

## 2024-12-01 PROCEDURE — 2500000003 HC RX 250 WO HCPCS: Performed by: STUDENT IN AN ORGANIZED HEALTH CARE EDUCATION/TRAINING PROGRAM

## 2024-12-01 PROCEDURE — 6370000000 HC RX 637 (ALT 250 FOR IP): Performed by: STUDENT IN AN ORGANIZED HEALTH CARE EDUCATION/TRAINING PROGRAM

## 2024-12-01 PROCEDURE — 87449 NOS EACH ORGANISM AG IA: CPT

## 2024-12-01 PROCEDURE — 2580000003 HC RX 258: Performed by: STUDENT IN AN ORGANIZED HEALTH CARE EDUCATION/TRAINING PROGRAM

## 2024-12-01 PROCEDURE — 87324 CLOSTRIDIUM AG IA: CPT

## 2024-12-01 PROCEDURE — 6360000002 HC RX W HCPCS: Performed by: INTERNAL MEDICINE

## 2024-12-01 PROCEDURE — 99024 POSTOP FOLLOW-UP VISIT: CPT | Performed by: SURGERY

## 2024-12-01 PROCEDURE — 84100 ASSAY OF PHOSPHORUS: CPT

## 2024-12-01 PROCEDURE — 2580000003 HC RX 258: Performed by: SURGERY

## 2024-12-01 PROCEDURE — 36592 COLLECT BLOOD FROM PICC: CPT

## 2024-12-01 PROCEDURE — 6370000000 HC RX 637 (ALT 250 FOR IP): Performed by: SURGERY

## 2024-12-01 PROCEDURE — 80048 BASIC METABOLIC PNL TOTAL CA: CPT

## 2024-12-01 PROCEDURE — 83735 ASSAY OF MAGNESIUM: CPT

## 2024-12-01 PROCEDURE — 6360000002 HC RX W HCPCS: Performed by: SURGERY

## 2024-12-01 PROCEDURE — 1200000000 HC SEMI PRIVATE

## 2024-12-01 RX ORDER — INSULIN LISPRO 100 [IU]/ML
0-8 INJECTION, SOLUTION INTRAVENOUS; SUBCUTANEOUS
Status: DISCONTINUED | OUTPATIENT
Start: 2024-12-01 | End: 2024-12-10 | Stop reason: HOSPADM

## 2024-12-01 RX ADMIN — SODIUM CHLORIDE, PRESERVATIVE FREE 10 ML: 5 INJECTION INTRAVENOUS at 20:57

## 2024-12-01 RX ADMIN — INSULIN LISPRO 1 UNITS: 100 INJECTION, SOLUTION INTRAVENOUS; SUBCUTANEOUS at 04:28

## 2024-12-01 RX ADMIN — PANTOPRAZOLE SODIUM 40 MG: 40 INJECTION, POWDER, FOR SOLUTION INTRAVENOUS at 09:23

## 2024-12-01 RX ADMIN — INSULIN LISPRO 2 UNITS: 100 INJECTION, SOLUTION INTRAVENOUS; SUBCUTANEOUS at 17:14

## 2024-12-01 RX ADMIN — ENOXAPARIN SODIUM 40 MG: 100 INJECTION SUBCUTANEOUS at 09:23

## 2024-12-01 RX ADMIN — SODIUM CHLORIDE, PRESERVATIVE FREE 10 ML: 5 INJECTION INTRAVENOUS at 09:23

## 2024-12-01 RX ADMIN — Medication 10 ML: at 20:57

## 2024-12-01 RX ADMIN — POTASSIUM PHOSPHATE 15 MMOL: 236; 224 INJECTION, SOLUTION INTRAVENOUS at 11:44

## 2024-12-01 RX ADMIN — THIAMINE HYDROCHLORIDE 100 MG: 100 INJECTION, SOLUTION INTRAMUSCULAR; INTRAVENOUS at 09:25

## 2024-12-01 RX ADMIN — Medication 10 ML: at 09:23

## 2024-12-01 RX ADMIN — LEUCINE, PHENYLALANINE, LYSINE, METHIONINE, ISOLEUCINE, VALINE, HISTIDINE, THREONINE, TRYPTOPHAN, ALANINE, GLYCINE, ARGININE, PROLINE, SERINE, TYROSINE, SODIUM ACETATE, DIBASIC POTASSIUM PHOSPHATE, MAGNESIUM CHLORIDE, SODIUM CHLORIDE, CALCIUM CHLORIDE, DEXTROSE
1035; 575; 33; 20; 515; 240; 300; 365; 290; 51; 200; 280; 261; 340; 250; 340; 59; 210; 90; 20; 290 INJECTION INTRAVENOUS at 18:05

## 2024-12-01 RX ADMIN — INSULIN LISPRO 1 UNITS: 100 INJECTION, SOLUTION INTRAVENOUS; SUBCUTANEOUS at 00:13

## 2024-12-01 RX ADMIN — VENLAFAXINE HYDROCHLORIDE 150 MG: 150 CAPSULE, EXTENDED RELEASE ORAL at 09:23

## 2024-12-02 LAB
ABO + RH BLD: NORMAL
ANION GAP SERPL CALCULATED.3IONS-SCNC: 4 MMOL/L (ref 3–16)
BASOPHILS # BLD: 0 K/UL (ref 0–0.2)
BASOPHILS NFR BLD: 0.8 %
BLD GP AB SCN SERPL QL: NORMAL
BLOOD BANK DISPENSE STATUS: NORMAL
BLOOD BANK PRODUCT CODE: NORMAL
BPU ID: NORMAL
BUN SERPL-MCNC: 20 MG/DL (ref 7–20)
CALCIUM SERPL-MCNC: 10.3 MG/DL (ref 8.3–10.6)
CHLORIDE SERPL-SCNC: 112 MMOL/L (ref 99–110)
CO2 SERPL-SCNC: 30 MMOL/L (ref 21–32)
CREAT SERPL-MCNC: 0.9 MG/DL (ref 0.8–1.3)
DEPRECATED RDW RBC AUTO: 13.4 % (ref 12.4–15.4)
DESCRIPTION BLOOD BANK: NORMAL
EOSINOPHIL # BLD: 0.6 K/UL (ref 0–0.6)
EOSINOPHIL NFR BLD: 10.2 %
GFR SERPLBLD CREATININE-BSD FMLA CKD-EPI: 82 ML/MIN/{1.73_M2}
GLUCOSE BLD-MCNC: 118 MG/DL (ref 70–99)
GLUCOSE BLD-MCNC: 145 MG/DL (ref 70–99)
GLUCOSE BLD-MCNC: 186 MG/DL (ref 70–99)
GLUCOSE BLD-MCNC: 225 MG/DL (ref 70–99)
GLUCOSE SERPL-MCNC: 231 MG/DL (ref 70–99)
HCT VFR BLD AUTO: 20.6 % (ref 40.5–52.5)
HCT VFR BLD AUTO: 25.3 % (ref 40.5–52.5)
HGB BLD-MCNC: 6.9 G/DL (ref 13.5–17.5)
HGB BLD-MCNC: 8.2 G/DL (ref 13.5–17.5)
LYMPHOCYTES # BLD: 0.6 K/UL (ref 1–5.1)
LYMPHOCYTES NFR BLD: 11.1 %
MAGNESIUM SERPL-MCNC: 2.51 MG/DL (ref 1.8–2.4)
MCH RBC QN AUTO: 31.7 PG (ref 26–34)
MCHC RBC AUTO-ENTMCNC: 33.4 G/DL (ref 31–36)
MCV RBC AUTO: 94.9 FL (ref 80–100)
MONOCYTES # BLD: 0.5 K/UL (ref 0–1.3)
MONOCYTES NFR BLD: 8.3 %
NEUTROPHILS # BLD: 4.1 K/UL (ref 1.7–7.7)
NEUTROPHILS NFR BLD: 69.6 %
PERFORMED ON: ABNORMAL
PHOSPHATE SERPL-MCNC: 2.5 MG/DL (ref 2.5–4.9)
PLATELET # BLD AUTO: 208 K/UL (ref 135–450)
PMV BLD AUTO: 8.6 FL (ref 5–10.5)
POTASSIUM SERPL-SCNC: 3.5 MMOL/L (ref 3.5–5.1)
RBC # BLD AUTO: 2.17 M/UL (ref 4.2–5.9)
REASON FOR REJECTION: NORMAL
REJECTED TEST: NORMAL
SODIUM SERPL-SCNC: 146 MMOL/L (ref 136–145)
WBC # BLD AUTO: 5.8 K/UL (ref 4–11)

## 2024-12-02 PROCEDURE — 2500000003 HC RX 250 WO HCPCS: Performed by: STUDENT IN AN ORGANIZED HEALTH CARE EDUCATION/TRAINING PROGRAM

## 2024-12-02 PROCEDURE — 86850 RBC ANTIBODY SCREEN: CPT

## 2024-12-02 PROCEDURE — 83735 ASSAY OF MAGNESIUM: CPT

## 2024-12-02 PROCEDURE — APPNB30 APP NON BILLABLE TIME 0-30 MINS: Performed by: NURSE PRACTITIONER

## 2024-12-02 PROCEDURE — 84100 ASSAY OF PHOSPHORUS: CPT

## 2024-12-02 PROCEDURE — 97535 SELF CARE MNGMENT TRAINING: CPT

## 2024-12-02 PROCEDURE — 85014 HEMATOCRIT: CPT

## 2024-12-02 PROCEDURE — 2580000003 HC RX 258: Performed by: SURGERY

## 2024-12-02 PROCEDURE — 6370000000 HC RX 637 (ALT 250 FOR IP): Performed by: STUDENT IN AN ORGANIZED HEALTH CARE EDUCATION/TRAINING PROGRAM

## 2024-12-02 PROCEDURE — 86900 BLOOD TYPING SEROLOGIC ABO: CPT

## 2024-12-02 PROCEDURE — APPSS15 APP SPLIT SHARED TIME 0-15 MINUTES: Performed by: NURSE PRACTITIONER

## 2024-12-02 PROCEDURE — 36430 TRANSFUSION BLD/BLD COMPNT: CPT

## 2024-12-02 PROCEDURE — 86923 COMPATIBILITY TEST ELECTRIC: CPT

## 2024-12-02 PROCEDURE — 85018 HEMOGLOBIN: CPT

## 2024-12-02 PROCEDURE — P9040 RBC LEUKOREDUCED IRRADIATED: HCPCS

## 2024-12-02 PROCEDURE — 99024 POSTOP FOLLOW-UP VISIT: CPT | Performed by: SURGERY

## 2024-12-02 PROCEDURE — 80048 BASIC METABOLIC PNL TOTAL CA: CPT

## 2024-12-02 PROCEDURE — 2500000003 HC RX 250 WO HCPCS: Performed by: INTERNAL MEDICINE

## 2024-12-02 PROCEDURE — 6370000000 HC RX 637 (ALT 250 FOR IP): Performed by: SURGERY

## 2024-12-02 PROCEDURE — 6360000002 HC RX W HCPCS: Performed by: INTERNAL MEDICINE

## 2024-12-02 PROCEDURE — 1200000000 HC SEMI PRIVATE

## 2024-12-02 PROCEDURE — 30233N1 TRANSFUSION OF NONAUTOLOGOUS RED BLOOD CELLS INTO PERIPHERAL VEIN, PERCUTANEOUS APPROACH: ICD-10-PCS | Performed by: STUDENT IN AN ORGANIZED HEALTH CARE EDUCATION/TRAINING PROGRAM

## 2024-12-02 PROCEDURE — 36415 COLL VENOUS BLD VENIPUNCTURE: CPT

## 2024-12-02 PROCEDURE — 86901 BLOOD TYPING SEROLOGIC RH(D): CPT

## 2024-12-02 PROCEDURE — 85025 COMPLETE CBC W/AUTO DIFF WBC: CPT

## 2024-12-02 PROCEDURE — 6360000002 HC RX W HCPCS: Performed by: SURGERY

## 2024-12-02 RX ORDER — SODIUM CHLORIDE 9 MG/ML
INJECTION, SOLUTION INTRAVENOUS PRN
Status: DISCONTINUED | OUTPATIENT
Start: 2024-12-02 | End: 2024-12-10 | Stop reason: HOSPADM

## 2024-12-02 RX ADMIN — ASCORBIC ACID, VITAMIN A PALMITATE, CHOLECALCIFEROL, THIAMINE HYDROCHLORIDE, RIBOFLAVIN-5 PHOSPHATE SODIUM, PYRIDOXINE HYDROCHLORIDE, NIACINAMIDE, DEXPANTHENOL, ALPHA-TOCOPHEROL ACETATE, VITAMIN K1, FOLIC ACID, BIOTIN, CYANOCOBALAMIN: 200; 3300; 200; 6; 3.6; 6; 40; 15; 10; 150; 600; 60; 5 INJECTION, SOLUTION INTRAVENOUS at 18:17

## 2024-12-02 RX ADMIN — I.V. FAT EMULSION 250 ML: 20 EMULSION INTRAVENOUS at 18:16

## 2024-12-02 RX ADMIN — PANTOPRAZOLE SODIUM 40 MG: 40 INJECTION, POWDER, FOR SOLUTION INTRAVENOUS at 08:00

## 2024-12-02 RX ADMIN — INSULIN LISPRO 2 UNITS: 100 INJECTION, SOLUTION INTRAVENOUS; SUBCUTANEOUS at 20:52

## 2024-12-02 RX ADMIN — VENLAFAXINE HYDROCHLORIDE 150 MG: 150 CAPSULE, EXTENDED RELEASE ORAL at 08:00

## 2024-12-02 RX ADMIN — ENOXAPARIN SODIUM 40 MG: 100 INJECTION SUBCUTANEOUS at 08:00

## 2024-12-02 RX ADMIN — THIAMINE HYDROCHLORIDE 100 MG: 100 INJECTION, SOLUTION INTRAMUSCULAR; INTRAVENOUS at 08:16

## 2024-12-02 RX ADMIN — Medication 10 ML: at 08:01

## 2024-12-02 RX ADMIN — SODIUM CHLORIDE, PRESERVATIVE FREE 10 ML: 5 INJECTION INTRAVENOUS at 08:01

## 2024-12-02 RX ADMIN — SODIUM CHLORIDE, PRESERVATIVE FREE 10 ML: 5 INJECTION INTRAVENOUS at 08:00

## 2024-12-02 RX ADMIN — INSULIN LISPRO 2 UNITS: 100 INJECTION, SOLUTION INTRAVENOUS; SUBCUTANEOUS at 08:00

## 2024-12-02 NOTE — OP NOTE
[address] [address]                            OPERATIVE REPORT      PATIENT NAME: CARLEY LAINEZ            : 1935  MED REC NO: [id number]                      ROOM: [address] [place] [address]  ACCOUNT NO: [id number]                       ADMIT DATE: 2024  PROVIDER: Nghia Sheridan MD      DATE OF PROCEDURE:  2024    SURGEON:  Nghia Sheridan MD    PREOPERATIVE DIAGNOSIS:  Enterocutaneous fistula.    POSTOPERATIVE DIAGNOSIS:  Enterocutaneous fistula.    PROCEDURE:  Exploratory laparotomy with extensive lysis of adhesions (1 hour), small-bowel resection with anastomosis and fascial closure with retention sutures.    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  The patient progressed well postoperatively, but was seen in the office 3 days ago with complaints of failure of progression of the open abdominal wound.  The patient was noted to have a fascial dehiscence.  The bowel was not eviscerated.  The wound was generally clean.  A wound VAC was ordered as an outpatient.  He presented shortly thereafter with copious drainage from the wound.  On current physical examination, he has a widely patent enterocutaneous fistula.  We discussed conservative management versus surgical exploration.  We eventually elected to proceed with surgical exploration.    DETAILS OF PROCEDURE:  The patient was brought to operative suite, placed supine position on the operative table.  After general endotracheal anesthesia, he was prepped and draped in the usual sterile fashion.  He had a midline open abdominal wound with exposed bowel in the enterocutaneous fistula.  Made largest incision both superiorly and inferiorly.  Superiorly and inferiorly, we were able to open the midline and get into the abdominal cavity where there were no adhesions.

## 2024-12-03 ENCOUNTER — APPOINTMENT (OUTPATIENT)
Dept: GENERAL RADIOLOGY | Age: 89
DRG: 329 | End: 2024-12-03
Payer: MEDICARE

## 2024-12-03 PROBLEM — E43 SEVERE MALNUTRITION (HCC): Chronic | Status: ACTIVE | Noted: 2024-11-14

## 2024-12-03 LAB
ANION GAP SERPL CALCULATED.3IONS-SCNC: 5 MMOL/L (ref 3–16)
BUN SERPL-MCNC: 19 MG/DL (ref 7–20)
CALCIUM SERPL-MCNC: 10.3 MG/DL (ref 8.3–10.6)
CHLORIDE SERPL-SCNC: 111 MMOL/L (ref 99–110)
CO2 SERPL-SCNC: 31 MMOL/L (ref 21–32)
CREAT SERPL-MCNC: 0.9 MG/DL (ref 0.8–1.3)
DEPRECATED RDW RBC AUTO: 15.7 % (ref 12.4–15.4)
GFR SERPLBLD CREATININE-BSD FMLA CKD-EPI: 82 ML/MIN/{1.73_M2}
GLUCOSE BLD-MCNC: 140 MG/DL (ref 70–99)
GLUCOSE BLD-MCNC: 181 MG/DL (ref 70–99)
GLUCOSE BLD-MCNC: 202 MG/DL (ref 70–99)
GLUCOSE BLD-MCNC: 216 MG/DL (ref 70–99)
GLUCOSE SERPL-MCNC: 219 MG/DL (ref 70–99)
HCT VFR BLD AUTO: 25.6 % (ref 40.5–52.5)
HGB BLD-MCNC: 8.3 G/DL (ref 13.5–17.5)
MAGNESIUM SERPL-MCNC: 2.52 MG/DL (ref 1.8–2.4)
MCH RBC QN AUTO: 30.9 PG (ref 26–34)
MCHC RBC AUTO-ENTMCNC: 32.4 G/DL (ref 31–36)
MCV RBC AUTO: 95.4 FL (ref 80–100)
PERFORMED ON: ABNORMAL
PHOSPHATE SERPL-MCNC: 2.5 MG/DL (ref 2.5–4.9)
PLATELET # BLD AUTO: 218 K/UL (ref 135–450)
PMV BLD AUTO: 8.9 FL (ref 5–10.5)
POTASSIUM SERPL-SCNC: 3.4 MMOL/L (ref 3.5–5.1)
RBC # BLD AUTO: 2.68 M/UL (ref 4.2–5.9)
SODIUM SERPL-SCNC: 147 MMOL/L (ref 136–145)
WBC # BLD AUTO: 8 K/UL (ref 4–11)

## 2024-12-03 PROCEDURE — 80048 BASIC METABOLIC PNL TOTAL CA: CPT

## 2024-12-03 PROCEDURE — 2580000003 HC RX 258: Performed by: SURGERY

## 2024-12-03 PROCEDURE — 84100 ASSAY OF PHOSPHORUS: CPT

## 2024-12-03 PROCEDURE — 6360000002 HC RX W HCPCS: Performed by: SURGERY

## 2024-12-03 PROCEDURE — 2500000003 HC RX 250 WO HCPCS: Performed by: SURGERY

## 2024-12-03 PROCEDURE — 97535 SELF CARE MNGMENT TRAINING: CPT

## 2024-12-03 PROCEDURE — APPNB30 APP NON BILLABLE TIME 0-30 MINS: Performed by: NURSE PRACTITIONER

## 2024-12-03 PROCEDURE — 74019 RADEX ABDOMEN 2 VIEWS: CPT

## 2024-12-03 PROCEDURE — 97530 THERAPEUTIC ACTIVITIES: CPT

## 2024-12-03 PROCEDURE — 6360000002 HC RX W HCPCS: Performed by: INTERNAL MEDICINE

## 2024-12-03 PROCEDURE — 99024 POSTOP FOLLOW-UP VISIT: CPT | Performed by: SURGERY

## 2024-12-03 PROCEDURE — 1200000000 HC SEMI PRIVATE

## 2024-12-03 PROCEDURE — 97116 GAIT TRAINING THERAPY: CPT

## 2024-12-03 PROCEDURE — 83735 ASSAY OF MAGNESIUM: CPT

## 2024-12-03 PROCEDURE — 6370000000 HC RX 637 (ALT 250 FOR IP): Performed by: STUDENT IN AN ORGANIZED HEALTH CARE EDUCATION/TRAINING PROGRAM

## 2024-12-03 PROCEDURE — 85027 COMPLETE CBC AUTOMATED: CPT

## 2024-12-03 PROCEDURE — APPSS15 APP SPLIT SHARED TIME 0-15 MINUTES: Performed by: NURSE PRACTITIONER

## 2024-12-03 PROCEDURE — 6370000000 HC RX 637 (ALT 250 FOR IP): Performed by: SURGERY

## 2024-12-03 RX ADMIN — ENOXAPARIN SODIUM 40 MG: 100 INJECTION SUBCUTANEOUS at 08:28

## 2024-12-03 RX ADMIN — LEUCINE, PHENYLALANINE, LYSINE, METHIONINE, ISOLEUCINE, VALINE, HISTIDINE, THREONINE, TRYPTOPHAN, ALANINE, GLYCINE, ARGININE, PROLINE, SERINE, TYROSINE, SODIUM ACETATE, DIBASIC POTASSIUM PHOSPHATE, MAGNESIUM CHLORIDE, SODIUM CHLORIDE, CALCIUM CHLORIDE, DEXTROSE
365; 280; 290; 200; 300; 290; 240; 210; 90; 1035; 515; 575; 340; 250; 20; 340; 261; 51; 59; 33; 20 INJECTION INTRAVENOUS at 17:57

## 2024-12-03 RX ADMIN — SODIUM CHLORIDE, PRESERVATIVE FREE 10 ML: 5 INJECTION INTRAVENOUS at 08:28

## 2024-12-03 RX ADMIN — VENLAFAXINE HYDROCHLORIDE 150 MG: 150 CAPSULE, EXTENDED RELEASE ORAL at 08:27

## 2024-12-03 RX ADMIN — PANTOPRAZOLE SODIUM 40 MG: 40 INJECTION, POWDER, FOR SOLUTION INTRAVENOUS at 08:27

## 2024-12-03 RX ADMIN — THIAMINE HYDROCHLORIDE 100 MG: 100 INJECTION, SOLUTION INTRAMUSCULAR; INTRAVENOUS at 08:27

## 2024-12-03 RX ADMIN — INSULIN LISPRO 2 UNITS: 100 INJECTION, SOLUTION INTRAVENOUS; SUBCUTANEOUS at 11:58

## 2024-12-03 RX ADMIN — Medication 10 ML: at 08:28

## 2024-12-03 RX ADMIN — INSULIN LISPRO 2 UNITS: 100 INJECTION, SOLUTION INTRAVENOUS; SUBCUTANEOUS at 08:27

## 2024-12-03 RX ADMIN — INSULIN LISPRO 2 UNITS: 100 INJECTION, SOLUTION INTRAVENOUS; SUBCUTANEOUS at 17:52

## 2024-12-04 LAB
ALBUMIN SERPL-MCNC: 2.7 G/DL (ref 3.4–5)
ANION GAP SERPL CALCULATED.3IONS-SCNC: 5 MMOL/L (ref 3–16)
BACTERIA BLD CULT ORG #2: NORMAL
BACTERIA BLD CULT: NORMAL
BASOPHILS # BLD: 0 K/UL (ref 0–0.2)
BASOPHILS NFR BLD: 0.4 %
BUN SERPL-MCNC: 24 MG/DL (ref 7–20)
CALCIUM SERPL-MCNC: 10.4 MG/DL (ref 8.3–10.6)
CHLORIDE SERPL-SCNC: 114 MMOL/L (ref 99–110)
CO2 SERPL-SCNC: 32 MMOL/L (ref 21–32)
CREAT SERPL-MCNC: 0.9 MG/DL (ref 0.8–1.3)
DEPRECATED RDW RBC AUTO: 15 % (ref 12.4–15.4)
EOSINOPHIL # BLD: 0.3 K/UL (ref 0–0.6)
EOSINOPHIL NFR BLD: 3.9 %
GFR SERPLBLD CREATININE-BSD FMLA CKD-EPI: 82 ML/MIN/{1.73_M2}
GLUCOSE BLD-MCNC: 185 MG/DL (ref 70–99)
GLUCOSE BLD-MCNC: 185 MG/DL (ref 70–99)
GLUCOSE BLD-MCNC: 198 MG/DL (ref 70–99)
GLUCOSE BLD-MCNC: 200 MG/DL (ref 70–99)
GLUCOSE BLD-MCNC: 239 MG/DL (ref 70–99)
GLUCOSE BLD-MCNC: 94 MG/DL (ref 70–99)
GLUCOSE SERPL-MCNC: 225 MG/DL (ref 70–99)
HCT VFR BLD AUTO: 24 % (ref 40.5–52.5)
HGB BLD-MCNC: 7.9 G/DL (ref 13.5–17.5)
LYMPHOCYTES # BLD: 0.5 K/UL (ref 1–5.1)
LYMPHOCYTES NFR BLD: 7.4 %
MAGNESIUM SERPL-MCNC: 2.46 MG/DL (ref 1.8–2.4)
MCH RBC QN AUTO: 30.7 PG (ref 26–34)
MCHC RBC AUTO-ENTMCNC: 33 G/DL (ref 31–36)
MCV RBC AUTO: 93.2 FL (ref 80–100)
MONOCYTES # BLD: 0.5 K/UL (ref 0–1.3)
MONOCYTES NFR BLD: 6.7 %
NEUTROPHILS # BLD: 5.7 K/UL (ref 1.7–7.7)
NEUTROPHILS NFR BLD: 81.6 %
PERFORMED ON: ABNORMAL
PERFORMED ON: NORMAL
PHOSPHATE SERPL-MCNC: 2 MG/DL (ref 2.5–4.9)
PHOSPHATE SERPL-MCNC: 2.1 MG/DL (ref 2.5–4.9)
PLATELET # BLD AUTO: 205 K/UL (ref 135–450)
PMV BLD AUTO: 9.1 FL (ref 5–10.5)
POTASSIUM SERPL-SCNC: 3.4 MMOL/L (ref 3.5–5.1)
RBC # BLD AUTO: 2.57 M/UL (ref 4.2–5.9)
SODIUM SERPL-SCNC: 151 MMOL/L (ref 136–145)
WBC # BLD AUTO: 7 K/UL (ref 4–11)

## 2024-12-04 PROCEDURE — APPNB30 APP NON BILLABLE TIME 0-30 MINS: Performed by: NURSE PRACTITIONER

## 2024-12-04 PROCEDURE — 97535 SELF CARE MNGMENT TRAINING: CPT

## 2024-12-04 PROCEDURE — 2580000003 HC RX 258: Performed by: SURGERY

## 2024-12-04 PROCEDURE — 6360000002 HC RX W HCPCS: Performed by: SURGERY

## 2024-12-04 PROCEDURE — 36592 COLLECT BLOOD FROM PICC: CPT

## 2024-12-04 PROCEDURE — 6370000000 HC RX 637 (ALT 250 FOR IP): Performed by: STUDENT IN AN ORGANIZED HEALTH CARE EDUCATION/TRAINING PROGRAM

## 2024-12-04 PROCEDURE — 92526 ORAL FUNCTION THERAPY: CPT

## 2024-12-04 PROCEDURE — 1200000000 HC SEMI PRIVATE

## 2024-12-04 PROCEDURE — 84100 ASSAY OF PHOSPHORUS: CPT

## 2024-12-04 PROCEDURE — 6370000000 HC RX 637 (ALT 250 FOR IP): Performed by: SURGERY

## 2024-12-04 PROCEDURE — APPSS15 APP SPLIT SHARED TIME 0-15 MINUTES: Performed by: NURSE PRACTITIONER

## 2024-12-04 PROCEDURE — 6370000000 HC RX 637 (ALT 250 FOR IP): Performed by: NURSE PRACTITIONER

## 2024-12-04 PROCEDURE — 2500000003 HC RX 250 WO HCPCS: Performed by: SURGERY

## 2024-12-04 PROCEDURE — 80069 RENAL FUNCTION PANEL: CPT

## 2024-12-04 PROCEDURE — 83735 ASSAY OF MAGNESIUM: CPT

## 2024-12-04 PROCEDURE — 6360000002 HC RX W HCPCS: Performed by: INTERNAL MEDICINE

## 2024-12-04 PROCEDURE — 99024 POSTOP FOLLOW-UP VISIT: CPT | Performed by: SURGERY

## 2024-12-04 PROCEDURE — 6360000002 HC RX W HCPCS: Performed by: NURSE PRACTITIONER

## 2024-12-04 PROCEDURE — 85025 COMPLETE CBC W/AUTO DIFF WBC: CPT

## 2024-12-04 RX ORDER — METOCLOPRAMIDE HYDROCHLORIDE 5 MG/ML
5 INJECTION INTRAMUSCULAR; INTRAVENOUS EVERY 6 HOURS
Status: COMPLETED | OUTPATIENT
Start: 2024-12-04 | End: 2024-12-07

## 2024-12-04 RX ADMIN — INSULIN LISPRO 2 UNITS: 100 INJECTION, SOLUTION INTRAVENOUS; SUBCUTANEOUS at 13:00

## 2024-12-04 RX ADMIN — THIAMINE HYDROCHLORIDE 100 MG: 100 INJECTION, SOLUTION INTRAMUSCULAR; INTRAVENOUS at 08:55

## 2024-12-04 RX ADMIN — SODIUM CHLORIDE, PRESERVATIVE FREE 10 ML: 5 INJECTION INTRAVENOUS at 20:19

## 2024-12-04 RX ADMIN — SODIUM CHLORIDE, PRESERVATIVE FREE 10 ML: 5 INJECTION INTRAVENOUS at 08:56

## 2024-12-04 RX ADMIN — INSULIN LISPRO 2 UNITS: 100 INJECTION, SOLUTION INTRAVENOUS; SUBCUTANEOUS at 17:56

## 2024-12-04 RX ADMIN — Medication 10 ML: at 08:56

## 2024-12-04 RX ADMIN — ASCORBIC ACID, VITAMIN A PALMITATE, CHOLECALCIFEROL, THIAMINE HYDROCHLORIDE, RIBOFLAVIN-5 PHOSPHATE SODIUM, PYRIDOXINE HYDROCHLORIDE, NIACINAMIDE, DEXPANTHENOL, ALPHA-TOCOPHEROL ACETATE, VITAMIN K1, FOLIC ACID, BIOTIN, CYANOCOBALAMIN: 200; 3300; 200; 6; 3.6; 6; 40; 15; 10; 150; 600; 60; 5 INJECTION, SOLUTION INTRAVENOUS at 18:11

## 2024-12-04 RX ADMIN — INSULIN LISPRO 2 UNITS: 100 INJECTION, SOLUTION INTRAVENOUS; SUBCUTANEOUS at 06:19

## 2024-12-04 RX ADMIN — LISINOPRIL 20 MG: 20 TABLET ORAL at 08:56

## 2024-12-04 RX ADMIN — METOCLOPRAMIDE HYDROCHLORIDE 5 MG: 5 INJECTION, SOLUTION INTRAMUSCULAR; INTRAVENOUS at 14:00

## 2024-12-04 RX ADMIN — VENLAFAXINE HYDROCHLORIDE 150 MG: 150 CAPSULE, EXTENDED RELEASE ORAL at 08:56

## 2024-12-04 RX ADMIN — ENOXAPARIN SODIUM 40 MG: 100 INJECTION SUBCUTANEOUS at 08:56

## 2024-12-04 RX ADMIN — PANTOPRAZOLE SODIUM 40 MG: 40 INJECTION, POWDER, FOR SOLUTION INTRAVENOUS at 08:56

## 2024-12-04 RX ADMIN — POTASSIUM PHOSPHATE, MONOBASIC POTASSIUM PHOSPHATE, DIBASIC 20 MMOL: 224; 236 INJECTION, SOLUTION, CONCENTRATE INTRAVENOUS at 13:53

## 2024-12-04 RX ADMIN — METOCLOPRAMIDE HYDROCHLORIDE 5 MG: 5 INJECTION, SOLUTION INTRAMUSCULAR; INTRAVENOUS at 17:56

## 2024-12-04 ASSESSMENT — PAIN SCALES - WONG BAKER
WONGBAKER_NUMERICALRESPONSE: NO HURT

## 2024-12-04 ASSESSMENT — PAIN SCALES - GENERAL: PAINLEVEL_OUTOF10: 0

## 2024-12-05 ENCOUNTER — APPOINTMENT (OUTPATIENT)
Dept: CT IMAGING | Age: 89
DRG: 329 | End: 2024-12-05
Payer: MEDICARE

## 2024-12-05 ENCOUNTER — APPOINTMENT (OUTPATIENT)
Dept: GENERAL RADIOLOGY | Age: 89
DRG: 329 | End: 2024-12-05
Payer: MEDICARE

## 2024-12-05 LAB
ANION GAP SERPL CALCULATED.3IONS-SCNC: 4 MMOL/L (ref 3–16)
BUN SERPL-MCNC: 25 MG/DL (ref 7–20)
CALCIUM SERPL-MCNC: 10 MG/DL (ref 8.3–10.6)
CHLORIDE SERPL-SCNC: 111 MMOL/L (ref 99–110)
CO2 SERPL-SCNC: 31 MMOL/L (ref 21–32)
CREAT SERPL-MCNC: 0.9 MG/DL (ref 0.8–1.3)
GFR SERPLBLD CREATININE-BSD FMLA CKD-EPI: 82 ML/MIN/{1.73_M2}
GLUCOSE BLD-MCNC: 140 MG/DL (ref 70–99)
GLUCOSE BLD-MCNC: 189 MG/DL (ref 70–99)
GLUCOSE BLD-MCNC: 207 MG/DL (ref 70–99)
GLUCOSE BLD-MCNC: 234 MG/DL (ref 70–99)
GLUCOSE BLD-MCNC: 244 MG/DL (ref 70–99)
GLUCOSE SERPL-MCNC: 220 MG/DL (ref 70–99)
MAGNESIUM SERPL-MCNC: 2.49 MG/DL (ref 1.8–2.4)
PERFORMED ON: ABNORMAL
PHOSPHATE SERPL-MCNC: 2.8 MG/DL (ref 2.5–4.9)
POTASSIUM SERPL-SCNC: 3.4 MMOL/L (ref 3.5–5.1)
SODIUM SERPL-SCNC: 146 MMOL/L (ref 136–145)

## 2024-12-05 PROCEDURE — 84100 ASSAY OF PHOSPHORUS: CPT

## 2024-12-05 PROCEDURE — 6360000004 HC RX CONTRAST MEDICATION: Performed by: SURGERY

## 2024-12-05 PROCEDURE — 80048 BASIC METABOLIC PNL TOTAL CA: CPT

## 2024-12-05 PROCEDURE — 83735 ASSAY OF MAGNESIUM: CPT

## 2024-12-05 PROCEDURE — 1200000000 HC SEMI PRIVATE

## 2024-12-05 PROCEDURE — 2500000003 HC RX 250 WO HCPCS: Performed by: INTERNAL MEDICINE

## 2024-12-05 PROCEDURE — 99024 POSTOP FOLLOW-UP VISIT: CPT | Performed by: SURGERY

## 2024-12-05 PROCEDURE — 97110 THERAPEUTIC EXERCISES: CPT

## 2024-12-05 PROCEDURE — 6370000000 HC RX 637 (ALT 250 FOR IP): Performed by: NURSE PRACTITIONER

## 2024-12-05 PROCEDURE — 6360000002 HC RX W HCPCS: Performed by: NURSE PRACTITIONER

## 2024-12-05 PROCEDURE — 6370000000 HC RX 637 (ALT 250 FOR IP): Performed by: SURGERY

## 2024-12-05 PROCEDURE — 74177 CT ABD & PELVIS W/CONTRAST: CPT

## 2024-12-05 PROCEDURE — 2580000003 HC RX 258: Performed by: SURGERY

## 2024-12-05 PROCEDURE — 6360000002 HC RX W HCPCS: Performed by: INTERNAL MEDICINE

## 2024-12-05 PROCEDURE — 74230 X-RAY XM SWLNG FUNCJ C+: CPT

## 2024-12-05 PROCEDURE — 6370000000 HC RX 637 (ALT 250 FOR IP): Performed by: STUDENT IN AN ORGANIZED HEALTH CARE EDUCATION/TRAINING PROGRAM

## 2024-12-05 PROCEDURE — 92526 ORAL FUNCTION THERAPY: CPT

## 2024-12-05 PROCEDURE — 6360000002 HC RX W HCPCS: Performed by: SURGERY

## 2024-12-05 PROCEDURE — 92611 MOTION FLUOROSCOPY/SWALLOW: CPT

## 2024-12-05 RX ORDER — DIATRIZOATE MEGLUMINE AND DIATRIZOATE SODIUM 660; 100 MG/ML; MG/ML
20 SOLUTION ORAL; RECTAL
Status: DISCONTINUED | OUTPATIENT
Start: 2024-12-05 | End: 2024-12-10 | Stop reason: HOSPADM

## 2024-12-05 RX ORDER — IOPAMIDOL 755 MG/ML
75 INJECTION, SOLUTION INTRAVASCULAR
Status: COMPLETED | OUTPATIENT
Start: 2024-12-05 | End: 2024-12-05

## 2024-12-05 RX ADMIN — PANTOPRAZOLE SODIUM 40 MG: 40 INJECTION, POWDER, FOR SOLUTION INTRAVENOUS at 09:30

## 2024-12-05 RX ADMIN — INSULIN LISPRO 2 UNITS: 100 INJECTION, SOLUTION INTRAVENOUS; SUBCUTANEOUS at 06:51

## 2024-12-05 RX ADMIN — IOPAMIDOL 75 ML: 755 INJECTION, SOLUTION INTRAVENOUS at 16:18

## 2024-12-05 RX ADMIN — THIAMINE HYDROCHLORIDE 100 MG: 100 INJECTION, SOLUTION INTRAMUSCULAR; INTRAVENOUS at 11:38

## 2024-12-05 RX ADMIN — DIATRIZOATE MEGLUMINE AND DIATRIZOATE SODIUM 20 ML: 660; 100 LIQUID ORAL; RECTAL at 15:11

## 2024-12-05 RX ADMIN — POTASSIUM BICARBONATE 50 MEQ: 978 TABLET, EFFERVESCENT ORAL at 09:26

## 2024-12-05 RX ADMIN — I.V. FAT EMULSION 250 ML: 20 EMULSION INTRAVENOUS at 19:07

## 2024-12-05 RX ADMIN — METOCLOPRAMIDE HYDROCHLORIDE 5 MG: 5 INJECTION, SOLUTION INTRAMUSCULAR; INTRAVENOUS at 01:41

## 2024-12-05 RX ADMIN — METOCLOPRAMIDE HYDROCHLORIDE 5 MG: 5 INJECTION, SOLUTION INTRAMUSCULAR; INTRAVENOUS at 19:37

## 2024-12-05 RX ADMIN — SODIUM CHLORIDE, PRESERVATIVE FREE 10 ML: 5 INJECTION INTRAVENOUS at 09:31

## 2024-12-05 RX ADMIN — METOCLOPRAMIDE HYDROCHLORIDE 5 MG: 5 INJECTION, SOLUTION INTRAMUSCULAR; INTRAVENOUS at 11:36

## 2024-12-05 RX ADMIN — INSULIN LISPRO 2 UNITS: 100 INJECTION, SOLUTION INTRAVENOUS; SUBCUTANEOUS at 19:44

## 2024-12-05 RX ADMIN — SODIUM CHLORIDE, PRESERVATIVE FREE 10 ML: 5 INJECTION INTRAVENOUS at 19:37

## 2024-12-05 RX ADMIN — METOCLOPRAMIDE HYDROCHLORIDE 5 MG: 5 INJECTION, SOLUTION INTRAMUSCULAR; INTRAVENOUS at 05:20

## 2024-12-05 RX ADMIN — VENLAFAXINE HYDROCHLORIDE 150 MG: 150 CAPSULE, EXTENDED RELEASE ORAL at 09:27

## 2024-12-05 RX ADMIN — LEUCINE, PHENYLALANINE, LYSINE, METHIONINE, ISOLEUCINE, VALINE, HISTIDINE, THREONINE, TRYPTOPHAN, ALANINE, GLYCINE, ARGININE, PROLINE, SERINE, TYROSINE, SODIUM ACETATE, DIBASIC POTASSIUM PHOSPHATE, MAGNESIUM CHLORIDE, SODIUM CHLORIDE, CALCIUM CHLORIDE, DEXTROSE
1035; 575; 33; 20; 515; 240; 300; 365; 290; 51; 200; 280; 261; 340; 250; 340; 59; 210; 90; 20; 290 INJECTION INTRAVENOUS at 19:04

## 2024-12-05 RX ADMIN — ENOXAPARIN SODIUM 40 MG: 100 INJECTION SUBCUTANEOUS at 09:30

## 2024-12-05 RX ADMIN — LISINOPRIL 20 MG: 20 TABLET ORAL at 09:27

## 2024-12-05 RX ADMIN — INSULIN LISPRO 2 UNITS: 100 INJECTION, SOLUTION INTRAVENOUS; SUBCUTANEOUS at 11:35

## 2024-12-05 ASSESSMENT — PAIN SCALES - WONG BAKER
WONGBAKER_NUMERICALRESPONSE: NO HURT

## 2024-12-05 NOTE — PROCEDURES
[facility] Procedure Note  [facility]    Provider: MARIELENA Tijerina CNP  Patient ID:  Admission Date: 2024 Name: Kevin Blair  OR Date: n/a MRN: [id number]   Patient Location: [id number] : 1935  Attending: Татьяна Schmitz MD Date of Service: 2024  PCP: Blane Callahan MD     Diagnoses:  1. Postoperative surgical complication involving skin associated with non-dermatologic procedure, unspecified complication    2. Inadequate oral intake    3. Goals of care, counseling/discussion    4. Small bowel obstruction (HCC)         Procedure:   1. Insertion/exchange of chandler catheter - complicated    Findings:   Likely false passage  CYU output    Indication for Procedure:  Nursing had attempted to pass a catheter and was unsuccessful.  The patient was informed of the need for bladder drainage based on currently having open ex lap and chandler requested by general surgery.   We had a discussion of the procedure. He had a chance to ask questions which were answered prior to the catheterization.     Procedure Details:  The urethral meatus was prepped and draped in the usual fashion. Viscous lidocaine jelly (2%) was instilled retrograde via the urethra. An 16 Fr coude tip Chandler catheter was then passed into the bladder. He had return of 10mL of clear yellow urine. 10mL of sterile water was instilled into the Chandler balloon. A stat lock was applied to the thigh. He tolerated the procedure well.    Plan:  See Progress Notes for urology plan regarding Chandler    MARIELENA Tiejrina CNP   2024   
Upright as possible with all PO intake , Small bites/sips , Swallow 2 times per bite , Eat/feed slowly, Remain upright 30-45 min , Cough/re-swallow , Aspiration Precautions     Treatments: Ongoing dysphagia therapy with SLP   Goals:  Recommend ongoing SLP to address dysphagia with goals per primary treating SLP   Pt will functionally tolerate recommended diet level with use of recommended compensatory strategies with no s/s of aspiration/penetration    Pt/family will demonstrate understanding of results Modified Barium Swallow Study, risk for aspiration, rationale for diet level and compensatory strategies  Pt will tolerate advance to least restrictive diet as evidenced by repeat instrumental swallow study     Consistencies given: thin liquids, mildly (nectar) thick liquids , puree , soft and bite sized solids , and regular solids     Oral Phase  Decreased bolus control   Prolonged/impaired mastication   Premature bolus loss   Impaired A-P bolus transport -reduced bolus manipulation, lingual pumping     Pharyngeal Phase  Pooling to the pyriforms   Delayed swallow onset   Decreased anterior hyoid movement   Reduced epiglottic inversion   Reduced tongue base retraction   decreased laryngeal elevation   Decreased laryngeal vestibule closure   Diminished pharyngeal stripping   Decreased Pharyngoesophageal segment opening   Moderate pharyngeal residue     Penetration/Aspiration Scores across consistencies (Luis Fernando et. al. 1996)   CONSISTENCY  Pen/Asp rating Description    Thin   3) Material enters the airway, remains above the vocal folds, and is not ejected from the airway  Material enters airway following swallow d/t moderate residue in pharynx   Mildly (nectar) thick   1) Material does not enter the airway     Moderately (honey) thick   N/A - consistency not provided     Puree   1) Material does not enter the airway     Soft Solid   1) Material does not enter the airway     Regular Solid   1) Material does not enter

## 2024-12-06 LAB
ANION GAP SERPL CALCULATED.3IONS-SCNC: 3 MMOL/L (ref 3–16)
BUN SERPL-MCNC: 21 MG/DL (ref 7–20)
CALCIUM SERPL-MCNC: 9.9 MG/DL (ref 8.3–10.6)
CHLORIDE SERPL-SCNC: 106 MMOL/L (ref 99–110)
CO2 SERPL-SCNC: 31 MMOL/L (ref 21–32)
CREAT SERPL-MCNC: 0.9 MG/DL (ref 0.8–1.3)
GFR SERPLBLD CREATININE-BSD FMLA CKD-EPI: 82 ML/MIN/{1.73_M2}
GLUCOSE BLD-MCNC: 175 MG/DL (ref 70–99)
GLUCOSE BLD-MCNC: 191 MG/DL (ref 70–99)
GLUCOSE BLD-MCNC: 194 MG/DL (ref 70–99)
GLUCOSE BLD-MCNC: 203 MG/DL (ref 70–99)
GLUCOSE BLD-MCNC: 230 MG/DL (ref 70–99)
GLUCOSE SERPL-MCNC: 200 MG/DL (ref 70–99)
MAGNESIUM SERPL-MCNC: 2.36 MG/DL (ref 1.8–2.4)
PERFORMED ON: ABNORMAL
PHOSPHATE SERPL-MCNC: 2.5 MG/DL (ref 2.5–4.9)
POTASSIUM SERPL-SCNC: 3.4 MMOL/L (ref 3.5–5.1)
SODIUM SERPL-SCNC: 140 MMOL/L (ref 136–145)
SODIUM SERPL-SCNC: 142 MMOL/L (ref 136–145)

## 2024-12-06 PROCEDURE — 84295 ASSAY OF SERUM SODIUM: CPT

## 2024-12-06 PROCEDURE — 92526 ORAL FUNCTION THERAPY: CPT

## 2024-12-06 PROCEDURE — 2580000003 HC RX 258: Performed by: SURGERY

## 2024-12-06 PROCEDURE — 6370000000 HC RX 637 (ALT 250 FOR IP): Performed by: STUDENT IN AN ORGANIZED HEALTH CARE EDUCATION/TRAINING PROGRAM

## 2024-12-06 PROCEDURE — 6370000000 HC RX 637 (ALT 250 FOR IP): Performed by: NURSE PRACTITIONER

## 2024-12-06 PROCEDURE — 2500000003 HC RX 250 WO HCPCS: Performed by: INTERNAL MEDICINE

## 2024-12-06 PROCEDURE — 80048 BASIC METABOLIC PNL TOTAL CA: CPT

## 2024-12-06 PROCEDURE — 83735 ASSAY OF MAGNESIUM: CPT

## 2024-12-06 PROCEDURE — 97535 SELF CARE MNGMENT TRAINING: CPT

## 2024-12-06 PROCEDURE — 1200000000 HC SEMI PRIVATE

## 2024-12-06 PROCEDURE — 6360000002 HC RX W HCPCS: Performed by: INTERNAL MEDICINE

## 2024-12-06 PROCEDURE — 6360000002 HC RX W HCPCS: Performed by: SURGERY

## 2024-12-06 PROCEDURE — 6370000000 HC RX 637 (ALT 250 FOR IP): Performed by: SURGERY

## 2024-12-06 PROCEDURE — 6360000002 HC RX W HCPCS: Performed by: NURSE PRACTITIONER

## 2024-12-06 PROCEDURE — 99024 POSTOP FOLLOW-UP VISIT: CPT | Performed by: SURGERY

## 2024-12-06 PROCEDURE — 84100 ASSAY OF PHOSPHORUS: CPT

## 2024-12-06 RX ADMIN — ENOXAPARIN SODIUM 40 MG: 100 INJECTION SUBCUTANEOUS at 10:18

## 2024-12-06 RX ADMIN — THIAMINE HYDROCHLORIDE 100 MG: 100 INJECTION, SOLUTION INTRAMUSCULAR; INTRAVENOUS at 10:25

## 2024-12-06 RX ADMIN — VENLAFAXINE HYDROCHLORIDE 150 MG: 150 CAPSULE, EXTENDED RELEASE ORAL at 10:17

## 2024-12-06 RX ADMIN — METOCLOPRAMIDE HYDROCHLORIDE 5 MG: 5 INJECTION, SOLUTION INTRAMUSCULAR; INTRAVENOUS at 01:16

## 2024-12-06 RX ADMIN — LISINOPRIL 20 MG: 20 TABLET ORAL at 10:24

## 2024-12-06 RX ADMIN — Medication 10 ML: at 10:36

## 2024-12-06 RX ADMIN — SODIUM CHLORIDE, PRESERVATIVE FREE 10 ML: 5 INJECTION INTRAVENOUS at 10:19

## 2024-12-06 RX ADMIN — METOCLOPRAMIDE HYDROCHLORIDE 5 MG: 5 INJECTION, SOLUTION INTRAMUSCULAR; INTRAVENOUS at 05:02

## 2024-12-06 RX ADMIN — METOCLOPRAMIDE HYDROCHLORIDE 5 MG: 5 INJECTION, SOLUTION INTRAMUSCULAR; INTRAVENOUS at 13:37

## 2024-12-06 RX ADMIN — INSULIN LISPRO 2 UNITS: 100 INJECTION, SOLUTION INTRAVENOUS; SUBCUTANEOUS at 10:18

## 2024-12-06 RX ADMIN — ASCORBIC ACID, VITAMIN A PALMITATE, CHOLECALCIFEROL, THIAMINE HYDROCHLORIDE, RIBOFLAVIN-5 PHOSPHATE SODIUM, PYRIDOXINE HYDROCHLORIDE, NIACINAMIDE, DEXPANTHENOL, ALPHA-TOCOPHEROL ACETATE, VITAMIN K1, FOLIC ACID, BIOTIN, CYANOCOBALAMIN: 200; 3300; 200; 6; 3.6; 6; 40; 15; 10; 150; 600; 60; 5 INJECTION, SOLUTION INTRAVENOUS at 18:02

## 2024-12-06 RX ADMIN — INSULIN LISPRO 2 UNITS: 100 INJECTION, SOLUTION INTRAVENOUS; SUBCUTANEOUS at 21:23

## 2024-12-06 RX ADMIN — METOCLOPRAMIDE HYDROCHLORIDE 5 MG: 5 INJECTION, SOLUTION INTRAMUSCULAR; INTRAVENOUS at 18:02

## 2024-12-06 RX ADMIN — Medication 10 ML: at 21:23

## 2024-12-06 RX ADMIN — SODIUM CHLORIDE, PRESERVATIVE FREE 10 ML: 5 INJECTION INTRAVENOUS at 10:37

## 2024-12-06 RX ADMIN — INSULIN LISPRO 2 UNITS: 100 INJECTION, SOLUTION INTRAVENOUS; SUBCUTANEOUS at 11:23

## 2024-12-06 RX ADMIN — PANTOPRAZOLE SODIUM 40 MG: 40 INJECTION, POWDER, FOR SOLUTION INTRAVENOUS at 10:18

## 2024-12-06 RX ADMIN — SODIUM CHLORIDE, PRESERVATIVE FREE 10 ML: 5 INJECTION INTRAVENOUS at 21:23

## 2024-12-06 NOTE — CONSULTS
GI Consult Note      Admission Date: 11/22/2024  Hospital Day: Hospital Day: 15  Attending: Татьяна Schmitz MD  Date of service: 12/6/24    Subjective:     Chief complaint/ Reason for consult:   Peg tube evaluation    HPI: Kevin Blair is a 89 y.o.  male patient, who was seen at the request of Татьяна Greer MD.    History was obtained from chart review and the patient.       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 Patient is on TPN.  Clinically progressing well except patient has very poor oral intake.  Repeat CT scan 12/5 revealed pneumoperitoneum and possible dynamic ileus.  According to Dr. Sheridan these are just residual free intraperitoneal air from surgery.  There was no contrast leakage and no evidence of obstruction.  We are being requested to place a PEG tube for enteral nutrition.    Patient admits to not eating because \"he does not feel hungry.\"  Denies abdominal pain, nausea, vomiting, bloating.  A few weeks ago, lower concern of aspiration but most recent modified barium swallow was normal.  Speech therapist recommended oropharyngeal dysphagia diet.                   Past Medical History:     Past Medical History:   Diagnosis Date    Arthritis     Diverticulitis     Hyperlipidemia     Hypertension     S/P colon resection        Past Surgical History:    Past Surgical History:   Procedure Laterality Date    CARPAL TUNNEL RELEASE      bilat    CATARACT REMOVAL      right eye    COLECTOMY      KNEE ARTHROSCOPY      right    LAPAROTOMY N/A 11/25/2024    EXPLORATORY LAPAROTOMY performed by Nghia Sheridan MD at [facility] OR    SHOULDER SURGERY      left 
  Clinical Pharmacy Note    Pharmacy consulted by Dr. Schmitz to manage TPN    Current TPN rate: new start  Goal TPN rate: 41ml/hr    Access: PICC  Indication: malnutrition    Labs:  General Labs:  BMP:    Lab Results   Component Value Date/Time     11/23/2024 04:59 AM    K 4.0 11/23/2024 04:59 AM     11/23/2024 04:59 AM    CO2 30 11/23/2024 04:59 AM    BUN 13 11/23/2024 04:59 AM    CREATININE 1.0 11/23/2024 04:59 AM    CALCIUM 10.4 11/23/2024 04:59 AM    GFRAA >60 04/22/2022 11:25 AM    GFRAA 47 03/16/2013 03:15 AM    LABGLOM 72 11/23/2024 04:59 AM    LABGLOM >60 05/16/2023 11:19 AM    GLUCOSE 106 11/23/2024 04:59 AM     Hepatic Function Panel:    Lab Results   Component Value Date/Time    ALKPHOS 74 11/23/2024 04:59 AM    ALT 13 11/23/2024 04:59 AM    AST 18 11/23/2024 04:59 AM    BILITOT 0.4 11/23/2024 04:59 AM     Ionized Calcium:  No components found for: \"IONCA\"  Magnesium:    Lab Results   Component Value Date/Time    MG 2.48 11/22/2024 06:21 PM     Phosphorus:    Lab Results   Component Value Date/Time    PHOS 2.2 11/13/2024 05:58 AM       Electrolyte replacement as follows:   Pending ionized Ca, Mg and phosphorous labs  Will monitor and replace as needed outside TPN    Blood sugars over past 24 hours: 106-111    Blood sugar management:  Initiate Humalog q4 hour sliding scale at low dose.    Plan to initiate TPN at 41 ml/hr.    Thank you for allowing pharmacy to participate in the care of this patient.    Parish Finn RP, PharmD 11/24/2024   
  Clinical Pharmacy Note    Pharmacy consulted by Dr. Sheridan to manage TPN    Current TPN rate: 40ml/hr  Goal TPN rate: 60ml/hr    Access: PICC  Indication: malnutrition    Labs:  General Labs:  BMP:    Lab Results   Component Value Date/Time     11/25/2024 04:55 AM    K 3.2 11/25/2024 04:55 AM     11/25/2024 04:55 AM    CO2 32 11/25/2024 04:55 AM    BUN 13 11/25/2024 04:55 AM    CREATININE 1.0 11/25/2024 04:55 AM    CALCIUM 10.4 11/25/2024 04:55 AM    GFRAA >60 04/22/2022 11:25 AM    GFRAA 47 03/16/2013 03:15 AM    LABGLOM 72 11/25/2024 04:55 AM    LABGLOM >60 05/16/2023 11:19 AM    GLUCOSE 126 11/25/2024 04:55 AM       Electrolyte replacement as follows:   Replace potassium with 40 mEq of potassium chloride administered IV over 4hours    Blood sugars over past 24 hours:     Blood sugar management:  Plan to Continue Humalog q4 hour sliding scale at low dose.    Plan to continue TPN at 40 ml/hr.    Thank you for allowing pharmacy to participate in the care of this patient.    Alex Alvarez RP, PharmD 11/25/2024    
  Clinical Pharmacy Note    Pharmacy consulted by Dr. Sheridan to manage TPN    Current TPN rate: 75ml/hr  Goal TPN rate: 75ml/hr    Access: central  Indication: malnutrition    Labs:  General Labs:  BMP:    Lab Results   Component Value Date/Time     12/03/2024 06:25 AM    K 3.4 12/03/2024 06:25 AM    K 3.4 11/26/2024 04:09 AM     12/03/2024 06:25 AM    CO2 31 12/03/2024 06:25 AM    BUN 19 12/03/2024 06:25 AM    CREATININE 0.9 12/03/2024 06:25 AM    CALCIUM 10.3 12/03/2024 06:25 AM    GFRAA >60 04/22/2022 11:25 AM    GFRAA 47 03/16/2013 03:15 AM    LABGLOM 82 12/03/2024 06:25 AM    LABGLOM >60 05/16/2023 11:19 AM    GLUCOSE 219 12/03/2024 06:25 AM     Magnesium:    Lab Results   Component Value Date/Time    MG 2.46 12/04/2024 05:15 AM     Phosphorus:    Lab Results   Component Value Date/Time    PHOS 2.0 12/04/2024 05:15 AM       Electrolyte replacement as follows:   Replace phosphorous with 20 mmol of potassium phosphate administered IV over 2hours    Blood sugars over past 24 hours: 150-200    Blood sugar management:  Plan to Continue Humalog q4 hour sliding scale at low dose.    Plan to continue TPN at 75 ml/hr.    Thank you for allowing pharmacy to participate in the care of this patient.    Alex Alvarez [facility], PharmD 12/4/2024    
  Clinical Pharmacy Note    Pharmacy consulted by Dr. Sheridan to manage TPN    Current TPN rate: 75ml/hr  Goal TPN rate: 75ml/hr    Access: central  Indication: malnutrition    Labs:  General Labs:  BMP:    Lab Results   Component Value Date/Time     12/05/2024 05:00 AM    K 3.4 12/05/2024 05:00 AM    K 3.4 11/26/2024 04:09 AM     12/05/2024 05:00 AM    CO2 31 12/05/2024 05:00 AM    BUN 25 12/05/2024 05:00 AM    CREATININE 0.9 12/05/2024 05:00 AM    CALCIUM 10.0 12/05/2024 05:00 AM    GFRAA >60 04/22/2022 11:25 AM    GFRAA 47 03/16/2013 03:15 AM    LABGLOM 82 12/05/2024 05:00 AM    LABGLOM >60 05/16/2023 11:19 AM    GLUCOSE 220 12/05/2024 05:00 AM     Magnesium:    Lab Results   Component Value Date/Time    MG 2.49 12/05/2024 05:00 AM     Phosphorus:    Lab Results   Component Value Date/Time    PHOS 2.8 12/05/2024 05:00 AM       Electrolyte replacement as follows:   No replacement    Blood sugars over past 24 hours:     Blood sugar management:  Plan to Continue Humalog q4 hour sliding scale at medium dose.    Plan to continue TPN at 75 ml/hr.    Thank you for allowing pharmacy to participate in the care of this patient.    Alex Alvarez [facility], PharmD 12/5/2024    
  Clinical Pharmacy Note    Pharmacy consulted by Dr. Sheridan to manage TPN    Current TPN rate: 75ml/hr  Goal TPN rate: 75ml/hr    Access: central  Indication: malnutrition    Labs:  General Labs:  BMP:    Lab Results   Component Value Date/Time     12/06/2024 05:00 AM    K 3.4 12/06/2024 05:00 AM    K 3.4 11/26/2024 04:09 AM     12/06/2024 05:00 AM    CO2 31 12/06/2024 05:00 AM    BUN 21 12/06/2024 05:00 AM    CREATININE 0.9 12/06/2024 05:00 AM    CALCIUM 9.9 12/06/2024 05:00 AM    GFRAA >60 04/22/2022 11:25 AM    GFRAA 47 03/16/2013 03:15 AM    LABGLOM 82 12/06/2024 05:00 AM    LABGLOM >60 05/16/2023 11:19 AM    GLUCOSE 200 12/06/2024 05:00 AM     Magnesium:    Lab Results   Component Value Date/Time    MG 2.36 12/06/2024 05:00 AM     Phosphorus:    Lab Results   Component Value Date/Time    PHOS 2.5 12/06/2024 05:00 AM       Electrolyte replacement as follows:   Lytes wnl    Blood sugars over past 24 hours: 190-220    Blood sugar management:  Plan to Continue Humalog q4 hour sliding scale at medium dose.    Plan to continue TPN at 75 ml/hr.    Thank you for allowing pharmacy to participate in the care of this patient.    Alex Alvarez RP, PharmD 12/6/2024    
Kevin Blair  12/5/2024  [id number]    Chief Complaint: Open abdominal wall wound, initial encounter    Subjective   HPI: Kevin Blair is a 89 y.o. male with PMHx notable for HTN, HLD who presented on 11/7 with abdominal pain, was found to have SBO secondary to midgut volvulus and underwent ex-lap w/ bowel resection on 11/7. He returned to hospital on 11/22 with open abdominal wound w/ drainage. He underwent ex-lap with small bowel resection on 11/25. Speech therapy following for dysphagia, currently recommending full liquid diet. He is currently requiring TPN, has declined PEG. Nephrology following for electrolyte derangements. CT Abdomen/Pelvis on 12/5 revealed increased pneumoperitoneum, presumed due to bowel perforation, and possible ileus.     Patient reports that he is not doing well. He is unable to eat or drink. He feels very weak and tired. He denies any fevers/chills, chest pain, dyspnea, focal numbness/weakness. He is understanding of plan to discharge to SNF for additional rehab, and is in agreement with this.          Past Medical History:   Diagnosis Date    Arthritis     Diverticulitis     Hyperlipidemia     Hypertension     S/P colon resection        Past Surgical History:   Procedure Laterality Date    CARPAL TUNNEL RELEASE      bilat    CATARACT REMOVAL      right eye    COLECTOMY      KNEE ARTHROSCOPY      right    LAPAROTOMY N/A 11/25/2024    EXPLORATORY LAPAROTOMY performed by Nghia Sheridan MD at [facility] OR    SHOULDER SURGERY      left    SMALL INTESTINE SURGERY N/A 11/7/2024    LAPAROTOMY EXPLORATORY, LYSIS OF ADHESIONS, SMALL BOWEL RESECTION performed by Nghia Sheridan MD at [facility] OR    SMALL INTESTINE SURGERY N/A 11/25/2024    SMALL BOWEL RESECTION performed by Nghia Sheridan MD at [facility] OR    TONSILLECTOMY      TOTAL HIP ARTHROPLASTY  07/11/2012    LEFT TOTAL HIP ARTHROPLASTY ANTERIOR APPROACH    TURP         Social History     Tobacco Use    Smoking status: 
Renal consult received  Full note to follow    Patient currently not in room    Thank you  
11/26/2024 04:09 AM    HCT 29.7 11/26/2024 04:09 AM    MCV 94.1 11/26/2024 04:09 AM    MCH 31.6 11/26/2024 04:09 AM    MCHC 33.6 11/26/2024 04:09 AM    RDW 13.3 11/26/2024 04:09 AM     11/26/2024 04:09 AM    MPV 7.5 11/26/2024 04:09 AM     BMP:   Lab Results   Component Value Date/Time     11/26/2024 04:09 AM    K 3.4 11/26/2024 04:09 AM     11/26/2024 04:09 AM    CO2 29 11/26/2024 04:09 AM    BUN 17 11/26/2024 04:09 AM    CREATININE 1.0 11/26/2024 04:09 AM    CALCIUM 9.7 11/26/2024 04:09 AM    GFRAA >60 04/22/2022 11:25 AM    GFRAA 47 03/16/2013 03:15 AM    LABGLOM 72 11/26/2024 04:09 AM    LABGLOM >60 05/16/2023 11:19 AM    GLUCOSE 177 11/26/2024 04:09 AM   Magnesium:    Lab Results   Component Value Date/Time    MG 2.37 11/26/2024 04:09 AM   Phosphorus:    Lab Results   Component Value Date/Time    PHOS 2.4 11/26/2024 04:09 AM     Urinalysis showed specific gravity greater than 1.030, glucose negative, bilirubin negative, protein trace, leukocyte esterase negative    CT abdomen and pelvis with IV contrast  IMPRESSION:  1.  Postoperative changes of laparotomy and partial small-bowel resection.  No bowel dilatation or drainable fluid collection identified.     2.  Extensive colonic diverticulosis.     3.  Possible right inguinal testicle.     4.  Additional findings, as above.    Calcium 9.7  Albumin 3    IMPRESSION/RECOMMENDATIONS:    1.  Hypernatremia-receiving TPN.  Better.  Follow serum sodium tomorrow.  Probably due to decreased free water intake.  Not polyuric.  Goal serum sodium in the next 24 hours around 140.  2.  Hypokalemia-in the setting of NG tube to suction plus poor p.o. intake.  Follow with IV replacement today.  On TPN.  3.  Hypophosphatemia-probably from decreased p.o. intake.  Replacing IV.  Follow Phos tomorrow.  4.  Hypermagnesemia-better  5.  Anemia-follow hemoglobin  6.  Status post colon resection.  Currently has NG tube and on TPN.    Thank you for the consult.  We will 
Contracted gallbladder.  Few small hepatic parenchymal hypodensities likely represent cysts or hemangiomas.  Otherwise unremarkable as visualized. GI/Bowel: No bowel dilatation identified.  No drainable fluid collection.  No appreciable bowel wall thickening.  Extensive colonic diverticulosis without acute diverticulitis.  Probable rectal fecal impaction.  Status post partial small-bowel resection with right paracentral abdominal anastomosis. Pelvis:   Limited evaluation due to streak artifact from left hip hardware. Enlarged prostate gland.  Nondistended urinary bladder. Possible right inguinal testicle. Peritoneum/Retroperitoneum: Large open ventral abdominal wall incision with multiple foci of extraluminal air.  No drainable fluid collection.  Mild central mesenteric edema. Bones/Soft Tissues:   Left hip arthroplasty.   No acute fracture or aggressive osseous lesion.     1.  Postoperative changes of laparotomy and partial small-bowel resection. No bowel dilatation or drainable fluid collection identified. 2.  Extensive colonic diverticulosis. 3.  Possible right inguinal testicle. 4.  Additional findings, as above.       IMPRESSION/RECOMMENDATIONS:    Enterocutaneous fistula  Open wound  S/P ex lap, small bowel resection  Dressing changed, wound cleaned, open fistula visible in wound  Will place PIC, begin TPN, continue antibiotics short term  Hold on diet, monitor drainage  May be able to cover with VAC at some point but too thick drainage to evacuate at this time  May ultimately need exploration and closure     Chapo Aquino MD

## 2024-12-07 LAB
ALBUMIN SERPL-MCNC: 2.6 G/DL (ref 3.4–5)
ANION GAP SERPL CALCULATED.3IONS-SCNC: 4 MMOL/L (ref 3–16)
ANISOCYTOSIS BLD QL SMEAR: ABNORMAL
BASOPHILS # BLD: 0.1 K/UL (ref 0–0.2)
BASOPHILS NFR BLD: 1 %
BUN SERPL-MCNC: 23 MG/DL (ref 7–20)
CALCIUM SERPL-MCNC: 10.1 MG/DL (ref 8.3–10.6)
CHLORIDE SERPL-SCNC: 107 MMOL/L (ref 99–110)
CO2 SERPL-SCNC: 29 MMOL/L (ref 21–32)
CREAT SERPL-MCNC: 0.9 MG/DL (ref 0.8–1.3)
DEPRECATED RDW RBC AUTO: 15.7 % (ref 12.4–15.4)
EOSINOPHIL # BLD: 0 K/UL (ref 0–0.6)
EOSINOPHIL NFR BLD: 0 %
GFR SERPLBLD CREATININE-BSD FMLA CKD-EPI: 82 ML/MIN/{1.73_M2}
GLUCOSE BLD-MCNC: 172 MG/DL (ref 70–99)
GLUCOSE BLD-MCNC: 206 MG/DL (ref 70–99)
GLUCOSE BLD-MCNC: 213 MG/DL (ref 70–99)
GLUCOSE BLD-MCNC: 236 MG/DL (ref 70–99)
GLUCOSE SERPL-MCNC: 221 MG/DL (ref 70–99)
HCT VFR BLD AUTO: 24.3 % (ref 40.5–52.5)
HGB BLD-MCNC: 8 G/DL (ref 13.5–17.5)
LYMPHOCYTES # BLD: 0.2 K/UL (ref 1–5.1)
LYMPHOCYTES NFR BLD: 3 %
MAGNESIUM SERPL-MCNC: 2.48 MG/DL (ref 1.8–2.4)
MCH RBC QN AUTO: 31.6 PG (ref 26–34)
MCHC RBC AUTO-ENTMCNC: 33.1 G/DL (ref 31–36)
MCV RBC AUTO: 95.7 FL (ref 80–100)
MONOCYTES # BLD: 0.1 K/UL (ref 0–1.3)
MONOCYTES NFR BLD: 1 %
NEUTROPHILS # BLD: 5.8 K/UL (ref 1.7–7.7)
NEUTROPHILS NFR BLD: 95 %
PERFORMED ON: ABNORMAL
PHOSPHATE SERPL-MCNC: 2.4 MG/DL (ref 2.5–4.9)
PLATELET # BLD AUTO: 204 K/UL (ref 135–450)
PLATELET BLD QL SMEAR: ADEQUATE
PMV BLD AUTO: 9.1 FL (ref 5–10.5)
POTASSIUM SERPL-SCNC: 4.1 MMOL/L (ref 3.5–5.1)
RBC # BLD AUTO: 2.54 M/UL (ref 4.2–5.9)
SLIDE REVIEW: ABNORMAL
SODIUM SERPL-SCNC: 140 MMOL/L (ref 136–145)
WBC # BLD AUTO: 6.1 K/UL (ref 4–11)

## 2024-12-07 PROCEDURE — 6370000000 HC RX 637 (ALT 250 FOR IP): Performed by: STUDENT IN AN ORGANIZED HEALTH CARE EDUCATION/TRAINING PROGRAM

## 2024-12-07 PROCEDURE — 6360000002 HC RX W HCPCS: Performed by: INTERNAL MEDICINE

## 2024-12-07 PROCEDURE — 99024 POSTOP FOLLOW-UP VISIT: CPT | Performed by: SURGERY

## 2024-12-07 PROCEDURE — 2500000003 HC RX 250 WO HCPCS: Performed by: INTERNAL MEDICINE

## 2024-12-07 PROCEDURE — 2580000003 HC RX 258: Performed by: SURGERY

## 2024-12-07 PROCEDURE — 2580000003 HC RX 258: Performed by: INTERNAL MEDICINE

## 2024-12-07 PROCEDURE — 6370000000 HC RX 637 (ALT 250 FOR IP): Performed by: NURSE PRACTITIONER

## 2024-12-07 PROCEDURE — 80069 RENAL FUNCTION PANEL: CPT

## 2024-12-07 PROCEDURE — 36415 COLL VENOUS BLD VENIPUNCTURE: CPT

## 2024-12-07 PROCEDURE — 6360000002 HC RX W HCPCS: Performed by: SURGERY

## 2024-12-07 PROCEDURE — 1200000000 HC SEMI PRIVATE

## 2024-12-07 PROCEDURE — 83735 ASSAY OF MAGNESIUM: CPT

## 2024-12-07 PROCEDURE — 6360000002 HC RX W HCPCS: Performed by: NURSE PRACTITIONER

## 2024-12-07 PROCEDURE — 85025 COMPLETE CBC W/AUTO DIFF WBC: CPT

## 2024-12-07 PROCEDURE — 6370000000 HC RX 637 (ALT 250 FOR IP): Performed by: SURGERY

## 2024-12-07 RX ADMIN — VENLAFAXINE HYDROCHLORIDE 150 MG: 150 CAPSULE, EXTENDED RELEASE ORAL at 10:09

## 2024-12-07 RX ADMIN — SODIUM CHLORIDE, PRESERVATIVE FREE 10 ML: 5 INJECTION INTRAVENOUS at 21:14

## 2024-12-07 RX ADMIN — ENOXAPARIN SODIUM 40 MG: 100 INJECTION SUBCUTANEOUS at 10:09

## 2024-12-07 RX ADMIN — METOCLOPRAMIDE HYDROCHLORIDE 5 MG: 5 INJECTION, SOLUTION INTRAMUSCULAR; INTRAVENOUS at 00:37

## 2024-12-07 RX ADMIN — THIAMINE HYDROCHLORIDE 100 MG: 100 INJECTION, SOLUTION INTRAMUSCULAR; INTRAVENOUS at 10:44

## 2024-12-07 RX ADMIN — METOCLOPRAMIDE HYDROCHLORIDE 5 MG: 5 INJECTION, SOLUTION INTRAMUSCULAR; INTRAVENOUS at 06:17

## 2024-12-07 RX ADMIN — LEUCINE, PHENYLALANINE, LYSINE, METHIONINE, ISOLEUCINE, VALINE, HISTIDINE, THREONINE, TRYPTOPHAN, ALANINE, GLYCINE, ARGININE, PROLINE, SERINE, TYROSINE, SODIUM ACETATE, DIBASIC POTASSIUM PHOSPHATE, MAGNESIUM CHLORIDE, SODIUM CHLORIDE, CALCIUM CHLORIDE, DEXTROSE
1035; 575; 33; 20; 515; 240; 300; 365; 290; 51; 200; 280; 261; 340; 250; 340; 59; 210; 90; 20; 290 INJECTION INTRAVENOUS at 18:29

## 2024-12-07 RX ADMIN — INSULIN LISPRO 2 UNITS: 100 INJECTION, SOLUTION INTRAVENOUS; SUBCUTANEOUS at 10:46

## 2024-12-07 RX ADMIN — POTASSIUM PHOSPHATE, MONOBASIC POTASSIUM PHOSPHATE, DIBASIC 10 MMOL: 224; 236 INJECTION, SOLUTION, CONCENTRATE INTRAVENOUS at 10:52

## 2024-12-07 RX ADMIN — Medication 10 ML: at 21:17

## 2024-12-07 RX ADMIN — INSULIN LISPRO 2 UNITS: 100 INJECTION, SOLUTION INTRAVENOUS; SUBCUTANEOUS at 21:17

## 2024-12-07 RX ADMIN — INSULIN LISPRO 2 UNITS: 100 INJECTION, SOLUTION INTRAVENOUS; SUBCUTANEOUS at 11:02

## 2024-12-07 RX ADMIN — LISINOPRIL 20 MG: 20 TABLET ORAL at 10:09

## 2024-12-07 RX ADMIN — PANTOPRAZOLE SODIUM 40 MG: 40 INJECTION, POWDER, FOR SOLUTION INTRAVENOUS at 10:07

## 2024-12-08 LAB
ANION GAP SERPL CALCULATED.3IONS-SCNC: 5 MMOL/L (ref 3–16)
BASOPHILS # BLD: 0.1 K/UL (ref 0–0.2)
BASOPHILS NFR BLD: 0.9 %
BUN SERPL-MCNC: 26 MG/DL (ref 7–20)
CALCIUM SERPL-MCNC: 9.8 MG/DL (ref 8.3–10.6)
CHLORIDE SERPL-SCNC: 107 MMOL/L (ref 99–110)
CO2 SERPL-SCNC: 30 MMOL/L (ref 21–32)
CREAT SERPL-MCNC: 0.8 MG/DL (ref 0.8–1.3)
DEPRECATED RDW RBC AUTO: 15.5 % (ref 12.4–15.4)
EOSINOPHIL # BLD: 0.4 K/UL (ref 0–0.6)
EOSINOPHIL NFR BLD: 5.7 %
GFR SERPLBLD CREATININE-BSD FMLA CKD-EPI: 85 ML/MIN/{1.73_M2}
GLUCOSE BLD-MCNC: 140 MG/DL (ref 70–99)
GLUCOSE BLD-MCNC: 158 MG/DL (ref 70–99)
GLUCOSE BLD-MCNC: 222 MG/DL (ref 70–99)
GLUCOSE BLD-MCNC: 225 MG/DL (ref 70–99)
GLUCOSE SERPL-MCNC: 172 MG/DL (ref 70–99)
HCT VFR BLD AUTO: 21 % (ref 40.5–52.5)
HGB BLD-MCNC: 7.2 G/DL (ref 13.5–17.5)
LYMPHOCYTES # BLD: 0.8 K/UL (ref 1–5.1)
LYMPHOCYTES NFR BLD: 11.9 %
MAGNESIUM SERPL-MCNC: 2.23 MG/DL (ref 1.8–2.4)
MCH RBC QN AUTO: 32.7 PG (ref 26–34)
MCHC RBC AUTO-ENTMCNC: 34.1 G/DL (ref 31–36)
MCV RBC AUTO: 95.8 FL (ref 80–100)
MONOCYTES # BLD: 0.5 K/UL (ref 0–1.3)
MONOCYTES NFR BLD: 7.9 %
NEUTROPHILS # BLD: 4.7 K/UL (ref 1.7–7.7)
NEUTROPHILS NFR BLD: 73.6 %
PERFORMED ON: ABNORMAL
PHOSPHATE SERPL-MCNC: 2.2 MG/DL (ref 2.5–4.9)
PLATELET # BLD AUTO: 187 K/UL (ref 135–450)
PMV BLD AUTO: 9 FL (ref 5–10.5)
POTASSIUM SERPL-SCNC: 3.5 MMOL/L (ref 3.5–5.1)
RBC # BLD AUTO: 2.19 M/UL (ref 4.2–5.9)
SODIUM SERPL-SCNC: 142 MMOL/L (ref 136–145)
WBC # BLD AUTO: 6.4 K/UL (ref 4–11)

## 2024-12-08 PROCEDURE — 2580000003 HC RX 258: Performed by: NURSE PRACTITIONER

## 2024-12-08 PROCEDURE — 6360000002 HC RX W HCPCS: Performed by: NURSE PRACTITIONER

## 2024-12-08 PROCEDURE — 6370000000 HC RX 637 (ALT 250 FOR IP): Performed by: STUDENT IN AN ORGANIZED HEALTH CARE EDUCATION/TRAINING PROGRAM

## 2024-12-08 PROCEDURE — 36415 COLL VENOUS BLD VENIPUNCTURE: CPT

## 2024-12-08 PROCEDURE — 99024 POSTOP FOLLOW-UP VISIT: CPT | Performed by: SURGERY

## 2024-12-08 PROCEDURE — 1200000000 HC SEMI PRIVATE

## 2024-12-08 PROCEDURE — 2580000003 HC RX 258: Performed by: INTERNAL MEDICINE

## 2024-12-08 PROCEDURE — 83735 ASSAY OF MAGNESIUM: CPT

## 2024-12-08 PROCEDURE — 6370000000 HC RX 637 (ALT 250 FOR IP): Performed by: SURGERY

## 2024-12-08 PROCEDURE — 80048 BASIC METABOLIC PNL TOTAL CA: CPT

## 2024-12-08 PROCEDURE — 6360000002 HC RX W HCPCS: Performed by: INTERNAL MEDICINE

## 2024-12-08 PROCEDURE — 97116 GAIT TRAINING THERAPY: CPT

## 2024-12-08 PROCEDURE — 2500000003 HC RX 250 WO HCPCS: Performed by: INTERNAL MEDICINE

## 2024-12-08 PROCEDURE — 85025 COMPLETE CBC W/AUTO DIFF WBC: CPT

## 2024-12-08 PROCEDURE — 6360000002 HC RX W HCPCS: Performed by: SURGERY

## 2024-12-08 PROCEDURE — 97530 THERAPEUTIC ACTIVITIES: CPT

## 2024-12-08 PROCEDURE — 6370000000 HC RX 637 (ALT 250 FOR IP): Performed by: NURSE PRACTITIONER

## 2024-12-08 PROCEDURE — 84100 ASSAY OF PHOSPHORUS: CPT

## 2024-12-08 RX ORDER — SODIUM FERRIC GLUCONATE COMPLEX IN SUCROSE 12.5 MG/ML
62.5 INJECTION INTRAVENOUS ONCE
Status: DISCONTINUED | OUTPATIENT
Start: 2024-12-08 | End: 2024-12-08

## 2024-12-08 RX ADMIN — INSULIN LISPRO 2 UNITS: 100 INJECTION, SOLUTION INTRAVENOUS; SUBCUTANEOUS at 09:44

## 2024-12-08 RX ADMIN — LISINOPRIL 20 MG: 20 TABLET ORAL at 09:44

## 2024-12-08 RX ADMIN — PANTOPRAZOLE SODIUM 40 MG: 40 INJECTION, POWDER, FOR SOLUTION INTRAVENOUS at 09:44

## 2024-12-08 RX ADMIN — ALTEPLASE 2 MG: 2.2 INJECTION, POWDER, LYOPHILIZED, FOR SOLUTION INTRAVENOUS at 04:19

## 2024-12-08 RX ADMIN — THIAMINE HYDROCHLORIDE 100 MG: 100 INJECTION, SOLUTION INTRAMUSCULAR; INTRAVENOUS at 09:45

## 2024-12-08 RX ADMIN — SODIUM CHLORIDE 62.5 MG: 9 INJECTION, SOLUTION INTRAVENOUS at 10:02

## 2024-12-08 RX ADMIN — INSULIN LISPRO 2 UNITS: 100 INJECTION, SOLUTION INTRAVENOUS; SUBCUTANEOUS at 21:03

## 2024-12-08 RX ADMIN — VENLAFAXINE HYDROCHLORIDE 150 MG: 150 CAPSULE, EXTENDED RELEASE ORAL at 09:45

## 2024-12-08 RX ADMIN — POTASSIUM PHOSPHATE, MONOBASIC POTASSIUM PHOSPHATE, DIBASIC 25 MMOL: 224; 236 INJECTION, SOLUTION, CONCENTRATE INTRAVENOUS at 11:08

## 2024-12-08 RX ADMIN — ENOXAPARIN SODIUM 40 MG: 100 INJECTION SUBCUTANEOUS at 09:44

## 2024-12-08 RX ADMIN — LEUCINE, PHENYLALANINE, LYSINE, METHIONINE, ISOLEUCINE, VALINE, HISTIDINE, THREONINE, TRYPTOPHAN, ALANINE, GLYCINE, ARGININE, PROLINE, SERINE, TYROSINE, SODIUM ACETATE, DIBASIC POTASSIUM PHOSPHATE, MAGNESIUM CHLORIDE, SODIUM CHLORIDE, CALCIUM CHLORIDE, DEXTROSE
1035; 575; 33; 20; 515; 240; 300; 365; 290; 51; 200; 280; 261; 340; 250; 340; 59; 210; 90; 20; 290 INJECTION INTRAVENOUS at 18:16

## 2024-12-09 LAB
ABO + RH BLD: NORMAL
ANION GAP SERPL CALCULATED.3IONS-SCNC: 3 MMOL/L (ref 3–16)
BASOPHILS # BLD: 0.1 K/UL (ref 0–0.2)
BASOPHILS NFR BLD: 1 %
BLD GP AB SCN SERPL QL: NORMAL
BLOOD BANK DISPENSE STATUS: NORMAL
BLOOD BANK PRODUCT CODE: NORMAL
BPU ID: NORMAL
BUN SERPL-MCNC: 19 MG/DL (ref 7–20)
CALCIUM SERPL-MCNC: 9.6 MG/DL (ref 8.3–10.6)
CHLORIDE SERPL-SCNC: 109 MMOL/L (ref 99–110)
CO2 SERPL-SCNC: 30 MMOL/L (ref 21–32)
CREAT SERPL-MCNC: 0.8 MG/DL (ref 0.8–1.3)
DEPRECATED RDW RBC AUTO: 16.9 % (ref 12.4–15.4)
DESCRIPTION BLOOD BANK: NORMAL
EOSINOPHIL # BLD: 0.4 K/UL (ref 0–0.6)
EOSINOPHIL NFR BLD: 6.5 %
GFR SERPLBLD CREATININE-BSD FMLA CKD-EPI: 85 ML/MIN/{1.73_M2}
GLUCOSE BLD-MCNC: 142 MG/DL (ref 70–99)
GLUCOSE BLD-MCNC: 144 MG/DL (ref 70–99)
GLUCOSE BLD-MCNC: 209 MG/DL (ref 70–99)
GLUCOSE BLD-MCNC: 91 MG/DL (ref 70–99)
GLUCOSE SERPL-MCNC: 203 MG/DL (ref 70–99)
HCT VFR BLD AUTO: 20.8 % (ref 40.5–52.5)
HCT VFR BLD AUTO: 27.6 % (ref 40.5–52.5)
HGB BLD-MCNC: 6.8 G/DL (ref 13.5–17.5)
HGB BLD-MCNC: 8.6 G/DL (ref 13.5–17.5)
LYMPHOCYTES # BLD: 0.7 K/UL (ref 1–5.1)
LYMPHOCYTES NFR BLD: 11.3 %
MAGNESIUM SERPL-MCNC: 2.21 MG/DL (ref 1.8–2.4)
MCH RBC QN AUTO: 31.3 PG (ref 26–34)
MCHC RBC AUTO-ENTMCNC: 32.9 G/DL (ref 31–36)
MCV RBC AUTO: 95.3 FL (ref 80–100)
MONOCYTES # BLD: 0.5 K/UL (ref 0–1.3)
MONOCYTES NFR BLD: 8.5 %
NEUTROPHILS # BLD: 4.3 K/UL (ref 1.7–7.7)
NEUTROPHILS NFR BLD: 72.7 %
PERFORMED ON: ABNORMAL
PERFORMED ON: NORMAL
PHOSPHATE SERPL-MCNC: 2.3 MG/DL (ref 2.5–4.9)
PLATELET # BLD AUTO: 200 K/UL (ref 135–450)
PMV BLD AUTO: 8.9 FL (ref 5–10.5)
POTASSIUM SERPL-SCNC: 3.7 MMOL/L (ref 3.5–5.1)
RBC # BLD AUTO: 2.18 M/UL (ref 4.2–5.9)
SODIUM SERPL-SCNC: 142 MMOL/L (ref 136–145)
WBC # BLD AUTO: 5.9 K/UL (ref 4–11)

## 2024-12-09 PROCEDURE — 92526 ORAL FUNCTION THERAPY: CPT

## 2024-12-09 PROCEDURE — 1200000000 HC SEMI PRIVATE

## 2024-12-09 PROCEDURE — 83735 ASSAY OF MAGNESIUM: CPT

## 2024-12-09 PROCEDURE — 2580000003 HC RX 258: Performed by: NURSE PRACTITIONER

## 2024-12-09 PROCEDURE — 36430 TRANSFUSION BLD/BLD COMPNT: CPT

## 2024-12-09 PROCEDURE — APPSS15 APP SPLIT SHARED TIME 0-15 MINUTES: Performed by: NURSE PRACTITIONER

## 2024-12-09 PROCEDURE — 86923 COMPATIBILITY TEST ELECTRIC: CPT

## 2024-12-09 PROCEDURE — 6370000000 HC RX 637 (ALT 250 FOR IP): Performed by: NURSE PRACTITIONER

## 2024-12-09 PROCEDURE — 86850 RBC ANTIBODY SCREEN: CPT

## 2024-12-09 PROCEDURE — 85014 HEMATOCRIT: CPT

## 2024-12-09 PROCEDURE — 6360000002 HC RX W HCPCS: Performed by: NURSE PRACTITIONER

## 2024-12-09 PROCEDURE — 84100 ASSAY OF PHOSPHORUS: CPT

## 2024-12-09 PROCEDURE — 85018 HEMOGLOBIN: CPT

## 2024-12-09 PROCEDURE — APPNB30 APP NON BILLABLE TIME 0-30 MINS: Performed by: NURSE PRACTITIONER

## 2024-12-09 PROCEDURE — 6370000000 HC RX 637 (ALT 250 FOR IP): Performed by: STUDENT IN AN ORGANIZED HEALTH CARE EDUCATION/TRAINING PROGRAM

## 2024-12-09 PROCEDURE — 80048 BASIC METABOLIC PNL TOTAL CA: CPT

## 2024-12-09 PROCEDURE — 2500000003 HC RX 250 WO HCPCS: Performed by: NURSE PRACTITIONER

## 2024-12-09 PROCEDURE — 86901 BLOOD TYPING SEROLOGIC RH(D): CPT

## 2024-12-09 PROCEDURE — 85025 COMPLETE CBC W/AUTO DIFF WBC: CPT

## 2024-12-09 PROCEDURE — 2580000003 HC RX 258: Performed by: SURGERY

## 2024-12-09 PROCEDURE — 6370000000 HC RX 637 (ALT 250 FOR IP): Performed by: SURGERY

## 2024-12-09 PROCEDURE — 6360000002 HC RX W HCPCS: Performed by: INTERNAL MEDICINE

## 2024-12-09 PROCEDURE — 99024 POSTOP FOLLOW-UP VISIT: CPT | Performed by: SURGERY

## 2024-12-09 PROCEDURE — 6360000002 HC RX W HCPCS: Performed by: SURGERY

## 2024-12-09 PROCEDURE — 36415 COLL VENOUS BLD VENIPUNCTURE: CPT

## 2024-12-09 PROCEDURE — 86900 BLOOD TYPING SEROLOGIC ABO: CPT

## 2024-12-09 PROCEDURE — P9016 RBC LEUKOCYTES REDUCED: HCPCS

## 2024-12-09 RX ORDER — SODIUM CHLORIDE 9 MG/ML
INJECTION, SOLUTION INTRAVENOUS
Status: DISPENSED
Start: 2024-12-09 | End: 2024-12-09

## 2024-12-09 RX ORDER — SODIUM CHLORIDE 9 MG/ML
INJECTION, SOLUTION INTRAVENOUS PRN
Status: DISCONTINUED | OUTPATIENT
Start: 2024-12-09 | End: 2024-12-10 | Stop reason: HOSPADM

## 2024-12-09 RX ORDER — PANTOPRAZOLE SODIUM 40 MG/10ML
40 INJECTION, POWDER, LYOPHILIZED, FOR SOLUTION INTRAVENOUS 2 TIMES DAILY
Status: DISCONTINUED | OUTPATIENT
Start: 2024-12-09 | End: 2024-12-10

## 2024-12-09 RX ADMIN — SODIUM CHLORIDE, PRESERVATIVE FREE 10 ML: 5 INJECTION INTRAVENOUS at 21:12

## 2024-12-09 RX ADMIN — POTASSIUM PHOSPHATE, MONOBASIC POTASSIUM PHOSPHATE, DIBASIC 25 MMOL: 224; 236 INJECTION, SOLUTION, CONCENTRATE INTRAVENOUS at 10:11

## 2024-12-09 RX ADMIN — PANTOPRAZOLE SODIUM 40 MG: 40 INJECTION, POWDER, FOR SOLUTION INTRAVENOUS at 21:08

## 2024-12-09 RX ADMIN — PANTOPRAZOLE SODIUM 40 MG: 40 INJECTION, POWDER, FOR SOLUTION INTRAVENOUS at 10:04

## 2024-12-09 RX ADMIN — VENLAFAXINE HYDROCHLORIDE 150 MG: 150 CAPSULE, EXTENDED RELEASE ORAL at 08:42

## 2024-12-09 RX ADMIN — INSULIN LISPRO 2 UNITS: 100 INJECTION, SOLUTION INTRAVENOUS; SUBCUTANEOUS at 08:42

## 2024-12-09 RX ADMIN — Medication 10 ML: at 21:13

## 2024-12-09 RX ADMIN — THIAMINE HYDROCHLORIDE 100 MG: 100 INJECTION, SOLUTION INTRAMUSCULAR; INTRAVENOUS at 10:05

## 2024-12-09 RX ADMIN — MELATONIN TAB 3 MG 3 MG: 3 TAB at 21:08

## 2024-12-09 RX ADMIN — LISINOPRIL 20 MG: 20 TABLET ORAL at 08:42

## 2024-12-09 ASSESSMENT — PAIN SCALES - GENERAL
PAINLEVEL_OUTOF10: 0

## 2024-12-10 ENCOUNTER — ANESTHESIA EVENT (OUTPATIENT)
Dept: ENDOSCOPY | Age: 89
DRG: 329 | End: 2024-12-10
Payer: MEDICARE

## 2024-12-10 ENCOUNTER — ANESTHESIA (OUTPATIENT)
Dept: ENDOSCOPY | Age: 89
DRG: 329 | End: 2024-12-10
Payer: MEDICARE

## 2024-12-10 VITALS
SYSTOLIC BLOOD PRESSURE: 152 MMHG | DIASTOLIC BLOOD PRESSURE: 77 MMHG | HEART RATE: 71 BPM | OXYGEN SATURATION: 97 % | HEIGHT: 69 IN | WEIGHT: 116.18 LBS | TEMPERATURE: 97.5 F | RESPIRATION RATE: 16 BRPM | BODY MASS INDEX: 17.21 KG/M2

## 2024-12-10 LAB
ANION GAP SERPL CALCULATED.3IONS-SCNC: 4 MMOL/L (ref 3–16)
BUN SERPL-MCNC: 19 MG/DL (ref 7–20)
CALCIUM SERPL-MCNC: 9.7 MG/DL (ref 8.3–10.6)
CHLORIDE SERPL-SCNC: 107 MMOL/L (ref 99–110)
CO2 SERPL-SCNC: 29 MMOL/L (ref 21–32)
CREAT SERPL-MCNC: 0.9 MG/DL (ref 0.8–1.3)
DEPRECATED RDW RBC AUTO: 17.8 % (ref 12.4–15.4)
GFR SERPLBLD CREATININE-BSD FMLA CKD-EPI: 82 ML/MIN/{1.73_M2}
GLUCOSE BLD-MCNC: 101 MG/DL (ref 70–99)
GLUCOSE BLD-MCNC: 116 MG/DL (ref 70–99)
GLUCOSE BLD-MCNC: 87 MG/DL (ref 70–99)
GLUCOSE SERPL-MCNC: 104 MG/DL (ref 70–99)
HCT VFR BLD AUTO: 24.2 % (ref 40.5–52.5)
HGB BLD-MCNC: 8 G/DL (ref 13.5–17.5)
MAGNESIUM SERPL-MCNC: 2.14 MG/DL (ref 1.8–2.4)
MCH RBC QN AUTO: 30.9 PG (ref 26–34)
MCHC RBC AUTO-ENTMCNC: 33.2 G/DL (ref 31–36)
MCV RBC AUTO: 93.3 FL (ref 80–100)
PERFORMED ON: ABNORMAL
PERFORMED ON: ABNORMAL
PERFORMED ON: NORMAL
PHOSPHATE SERPL-MCNC: 2.6 MG/DL (ref 2.5–4.9)
PLATELET # BLD AUTO: 210 K/UL (ref 135–450)
PMV BLD AUTO: 8.5 FL (ref 5–10.5)
POTASSIUM SERPL-SCNC: 4 MMOL/L (ref 3.5–5.1)
RBC # BLD AUTO: 2.59 M/UL (ref 4.2–5.9)
SODIUM SERPL-SCNC: 140 MMOL/L (ref 136–145)
WBC # BLD AUTO: 6.8 K/UL (ref 4–11)

## 2024-12-10 PROCEDURE — 88305 TISSUE EXAM BY PATHOLOGIST: CPT

## 2024-12-10 PROCEDURE — 92526 ORAL FUNCTION THERAPY: CPT

## 2024-12-10 PROCEDURE — 80048 BASIC METABOLIC PNL TOTAL CA: CPT

## 2024-12-10 PROCEDURE — 3700000000 HC ANESTHESIA ATTENDED CARE: Performed by: INTERNAL MEDICINE

## 2024-12-10 PROCEDURE — APPNB30 APP NON BILLABLE TIME 0-30 MINS: Performed by: NURSE PRACTITIONER

## 2024-12-10 PROCEDURE — 0DB78ZX EXCISION OF STOMACH, PYLORUS, VIA NATURAL OR ARTIFICIAL OPENING ENDOSCOPIC, DIAGNOSTIC: ICD-10-PCS | Performed by: INTERNAL MEDICINE

## 2024-12-10 PROCEDURE — 83735 ASSAY OF MAGNESIUM: CPT

## 2024-12-10 PROCEDURE — 84100 ASSAY OF PHOSPHORUS: CPT

## 2024-12-10 PROCEDURE — 2580000003 HC RX 258: Performed by: SURGERY

## 2024-12-10 PROCEDURE — 0DB48ZX EXCISION OF ESOPHAGOGASTRIC JUNCTION, VIA NATURAL OR ARTIFICIAL OPENING ENDOSCOPIC, DIAGNOSTIC: ICD-10-PCS | Performed by: INTERNAL MEDICINE

## 2024-12-10 PROCEDURE — 97116 GAIT TRAINING THERAPY: CPT

## 2024-12-10 PROCEDURE — 7100000000 HC PACU RECOVERY - FIRST 15 MIN: Performed by: INTERNAL MEDICINE

## 2024-12-10 PROCEDURE — 99024 POSTOP FOLLOW-UP VISIT: CPT | Performed by: SURGERY

## 2024-12-10 PROCEDURE — 6360000002 HC RX W HCPCS: Performed by: NURSE PRACTITIONER

## 2024-12-10 PROCEDURE — 2709999900 HC NON-CHARGEABLE SUPPLY: Performed by: INTERNAL MEDICINE

## 2024-12-10 PROCEDURE — 7100000001 HC PACU RECOVERY - ADDTL 15 MIN: Performed by: INTERNAL MEDICINE

## 2024-12-10 PROCEDURE — 97110 THERAPEUTIC EXERCISES: CPT

## 2024-12-10 PROCEDURE — 85027 COMPLETE CBC AUTOMATED: CPT

## 2024-12-10 PROCEDURE — 6370000000 HC RX 637 (ALT 250 FOR IP): Performed by: NURSE PRACTITIONER

## 2024-12-10 PROCEDURE — 3700000001 HC ADD 15 MINUTES (ANESTHESIA): Performed by: INTERNAL MEDICINE

## 2024-12-10 PROCEDURE — 3609012400 HC EGD TRANSORAL BIOPSY SINGLE/MULTIPLE: Performed by: INTERNAL MEDICINE

## 2024-12-10 PROCEDURE — 6360000002 HC RX W HCPCS: Performed by: INTERNAL MEDICINE

## 2024-12-10 PROCEDURE — APPSS15 APP SPLIT SHARED TIME 0-15 MINUTES: Performed by: NURSE PRACTITIONER

## 2024-12-10 PROCEDURE — 6360000002 HC RX W HCPCS: Performed by: NURSE ANESTHETIST, CERTIFIED REGISTERED

## 2024-12-10 RX ORDER — LIDOCAINE HYDROCHLORIDE 20 MG/ML
INJECTION, SOLUTION INFILTRATION; PERINEURAL
Status: DISCONTINUED | OUTPATIENT
Start: 2024-12-10 | End: 2024-12-10 | Stop reason: SDUPTHER

## 2024-12-10 RX ORDER — PROPOFOL 10 MG/ML
INJECTION, EMULSION INTRAVENOUS
Status: DISCONTINUED | OUTPATIENT
Start: 2024-12-10 | End: 2024-12-10 | Stop reason: SDUPTHER

## 2024-12-10 RX ORDER — PANTOPRAZOLE SODIUM 40 MG/10ML
40 INJECTION, POWDER, LYOPHILIZED, FOR SOLUTION INTRAVENOUS DAILY
Status: DISCONTINUED | OUTPATIENT
Start: 2024-12-11 | End: 2024-12-10 | Stop reason: HOSPADM

## 2024-12-10 RX ORDER — PANTOPRAZOLE SODIUM 40 MG/1
40 TABLET, DELAYED RELEASE ORAL
Qty: 90 TABLET | Refills: 1
Start: 2024-12-10

## 2024-12-10 RX ORDER — MIRTAZAPINE 7.5 MG/1
7.5 TABLET, FILM COATED ORAL NIGHTLY
Qty: 30 TABLET | Refills: 0
Start: 2024-12-10

## 2024-12-10 RX ADMIN — LIDOCAINE HYDROCHLORIDE 100 MG: 20 INJECTION, SOLUTION INFILTRATION; PERINEURAL at 09:14

## 2024-12-10 RX ADMIN — THIAMINE HYDROCHLORIDE 100 MG: 100 INJECTION, SOLUTION INTRAMUSCULAR; INTRAVENOUS at 10:22

## 2024-12-10 RX ADMIN — PANTOPRAZOLE SODIUM 40 MG: 40 INJECTION, POWDER, FOR SOLUTION INTRAVENOUS at 10:22

## 2024-12-10 RX ADMIN — LISINOPRIL 20 MG: 20 TABLET ORAL at 10:22

## 2024-12-10 RX ADMIN — PROPOFOL 150 MG: 10 INJECTION, EMULSION INTRAVENOUS at 09:14

## 2024-12-10 RX ADMIN — SODIUM CHLORIDE, PRESERVATIVE FREE 20 ML: 5 INJECTION INTRAVENOUS at 10:22

## 2024-12-10 ASSESSMENT — PAIN - FUNCTIONAL ASSESSMENT
PAIN_FUNCTIONAL_ASSESSMENT: NONE - DENIES PAIN
PAIN_FUNCTIONAL_ASSESSMENT: WONG-BAKER FACES
PAIN_FUNCTIONAL_ASSESSMENT: 0-10
PAIN_FUNCTIONAL_ASSESSMENT: WONG-BAKER FACES
PAIN_FUNCTIONAL_ASSESSMENT: NONE - DENIES PAIN
PAIN_FUNCTIONAL_ASSESSMENT: WONG-BAKER FACES

## 2024-12-10 NOTE — BRIEF OP NOTE
Brief Postoperative Note      Patient: Kevin Blair  YOB: 1935  MRN: [id number]    Date of Procedure: 12/10/2024    Pre-Op Diagnosis Codes:      * Dark stools [R19.5]    Post-Op Diagnosis: Same       Procedure(s):  ESOPHAGOGASTRODUODENOSCOPY BIOPSY    Surgeon(s):  Elio Loera MD    Assistant:  * No surgical staff found *    Anesthesia: Monitor Anesthesia Care    Estimated Blood Loss (mL): Minimal    Complications: None    Specimens:   ID Type Source Tests Collected by Time Destination   A : Antrum Bx R/O H. Pylori Tissue Stomach SURGICAL PATHOLOGY Elio Loera MD 12/10/2024 0918    B : GE junction nodule Bx Tissue Stomach SURGICAL PATHOLOGY Elio Loera MD 12/10/2024 0921        Implants:  * No implants in log *      Drains:   Negative Pressure Wound Therapy Mid (Active)   Dressing Status Intact w/seal 12/04/24 0945   Canister changed? No 12/04/24 0945   Unit Type kci vac 12/04/24 0945   Mode Continuous 12/04/24 0945   Target Pressure (mmHg) 100 12/04/24 0945   $$ Dressing Changed & Charged $ Standard NPWT >50 sq cm PER TX 11/27/24 1509   Number of pieces placed 1 12/04/24 0945   Dressing Type Black foam;Non-adherent contact layer 12/04/24 0945   Dressing Change Due 11/29/24 12/04/24 0945       External Urinary Catheter (Active)   Site Assessment Clean,dry & intact 12/10/24 0500   Placement Replaced 12/09/24 0653   Securement Method Securing device (Describe) 12/09/24 0653   Catheter Care Suction Canister/Tubing changed 12/09/24 1325   Perineal Care Yes 12/09/24 0653   Suction 40 mmgHg continuous 12/10/24 0500   Urine Color Yellow 12/10/24 0500   Urine Appearance Clear 12/10/24 0500   Urine Odor Other (Comment) 12/07/24 1209   Output (mL) 1500 mL 12/10/24 0500       [REMOVED] Negative Pressure Wound Therapy Abdomen Mid (Removed)   Dressing Status Intact w/seal 11/22/24 2336   Target Pressure (mmHg) 100 11/22/24 2336   Number of pieces placed 1 11/22/24 2336   Dressing Type Black foam;Other 
1800   Urine Color Yellow;Valinda 11/08/24 1800   Urine Appearance Clear 11/08/24 1800   Urine Odor Other (Comment) 11/08/24 1800   Collection Container Standard 11/08/24 1800   Securement Method Securing device (Describe) 11/08/24 1800   Catheter Care  Soap and water 11/08/24 0130   Catheter Best Practices  Drainage tube clipped to bed;Catheter secured to thigh;Tamper seal intact;Bag below bladder;Bag not on floor;Lack of dependent loop in tubing;Drainage bag less than half full 11/08/24 1800   Status Patent;Draining 11/08/24 1800   Output (mL) 250 mL 11/08/24 1406       [REMOVED] External Urinary Catheter (Removed)   Site Assessment Clean,dry & intact 11/11/24 0335   Placement Replaced 11/11/24 0335   Securement Method Securing device (Describe) 11/10/24 2000   Catheter Care Catheter/Wick replaced 11/11/24 0335   Perineal Care Yes 11/11/24 0335   Suction 40 mmgHg continuous 11/11/24 0335   Urine Color Yellow 11/10/24 2000   Urine Appearance Cloudy 11/10/24 2000   Output (mL) 350 mL 11/11/24 1450       Findings:  Infection Present At Time Of Surgery (PATOS) (choose all levels that have infection present):  - Organ Space infection (below fascia) present as evidenced by fluid consistent with infection  Other Findings: Lysis of adhesions (1 hour), small bowel resection with anastomosis, fascial closure with placement of retention sutures.    Electronically signed by Nghia Sheridan MD on 11/25/2024 at 12:53 PM

## 2024-12-10 NOTE — DISCHARGE SUMMARY
Hospital Medicine Discharge Summary    Patient: Kevin Blair     Gender: male  : 1935   Age: 89 y.o.  MRN: [id number]    Admitting Physician: Sada Ny MD  Discharge Physician: Eve Santos PA-C     Code Status: Full Code     Admit Date: 2024   Discharge Date:   12/10/2024    Disposition:  To a non-[facility] facility  Time spent arranging discharge: 45 minutes    Discharge Diagnoses:    Active Hospital Problems    Diagnosis Date Noted    Enterocutaneous fistula [K63.2] 2024    Postoperative surgical complication involving skin associated with non-dermatologic procedure [L76.82] 2024    Open abdominal wall wound, initial encounter [S31.109A] 2024    Severe malnutrition (HCC) [E43] 2024       Recommendations: Follow up with Dr Sheridan in 2 week, PCP one week. Cbc and BMP weekly. Wet to dry dressing to abdominal wound daily    Condition at Discharge:  Stable    Hospital Course:   Patient is a pleasant 89-year-old male who had recently undergone exploratory laparotomy with small bowel resection for volvulus and associated ischemic bowel on .  He was seen in the surgical office for follow-up on  and was noted to have increased wound drainage and dehiscence.  In the ED CT was negative for drainable fluid collection.  White blood cell count was normal.    Enterocutaneous fistula-patient was admitted, started on IV antibiotics, and seen by general surgery.  Diagnosed with enterocutaneous fistula.  PICC line was placed and he was started on TPN.  He was taken to the operating room by Dr. Sheridan on  for exploratory laparotomy with small bowel resection.  Retention sutures were placed at that time.  He had a wound VAC initially postop.  He did have a complex Chavez catheter placement intraoperatively secondary to a false passage.  A few days postop Chavez was removed and he is voiding without difficulty.  He did have a repeat CT

## 2024-12-10 NOTE — PLAN OF CARE
Problem: Discharge Planning  Goal: Discharge to home or other facility with appropriate resources  11/24/2024 2215 by Alecia Saldana, RN  Outcome: Progressing     Problem: Skin/Tissue Integrity  Goal: Absence of new skin breakdown  Description: 1.  Monitor for areas of redness and/or skin breakdown  2.  Assess vascular access sites hourly  3.  Every 4-6 hours minimum:  Change oxygen saturation probe site  4.  Every 4-6 hours:  If on nasal continuous positive airway pressure, respiratory therapy assess nares and determine need for appliance change or resting period.  11/24/2024 2215 by Alecia Saldana, RN  Outcome: Progressing     Problem: ABCDS Injury Assessment  Goal: Absence of physical injury  11/24/2024 2215 by Alecia Saldana, RN  Outcome: Progressing     Problem: Safety - Adult  Goal: Free from fall injury  11/24/2024 2215 by Alecia Saldana, RN  Outcome: Progressing     
  Problem: Discharge Planning  Goal: Discharge to home or other facility with appropriate resources  11/26/2024 2251 by Alecia Saldana, RN  Outcome: Progressing     Problem: Skin/Tissue Integrity  Goal: Absence of new skin breakdown  Description: 1.  Monitor for areas of redness and/or skin breakdown  2.  Assess vascular access sites hourly  3.  Every 4-6 hours minimum:  Change oxygen saturation probe site  4.  Every 4-6 hours:  If on nasal continuous positive airway pressure, respiratory therapy assess nares and determine need for appliance change or resting period.  11/26/2024 2251 by Alecia Saldana, RN  Outcome: Progressing     Problem: ABCDS Injury Assessment  Goal: Absence of physical injury  11/26/2024 2251 by Alecia Saldana RN  Outcome: Progressing     Problem: Safety - Adult  Goal: Free from fall injury  11/26/2024 2251 by Alecia Saldana, RN  Outcome: Progressing     Problem: Pain  Goal: Verbalizes/displays adequate comfort level or baseline comfort level  11/26/2024 2251 by Alecia Saldana, RN  Outcome: Progressing     Problem: Nutrition Deficit:  Goal: Optimize nutritional status  11/26/2024 2251 by Alecia Saldana, RN  Outcome: Progressing     
  Problem: Discharge Planning  Goal: Discharge to home or other facility with appropriate resources  12/5/2024 2114 by Patrick Turpin RN  Outcome: Progressing     Problem: Skin/Tissue Integrity  Goal: Absence of new skin breakdown  Description: 1.  Monitor for areas of redness and/or skin breakdown  2.  Assess vascular access sites hourly  3.  Every 4-6 hours minimum:  Change oxygen saturation probe site  4.  Every 4-6 hours:  If on nasal continuous positive airway pressure, respiratory therapy assess nares and determine need for appliance change or resting period.  12/5/2024 2114 by Patrick Turpin RN  Outcome: Progressing     Problem: ABCDS Injury Assessment  Goal: Absence of physical injury  12/5/2024 2114 by Patrick Turpin RN  Outcome: Progressing     Problem: Safety - Adult  Goal: Free from fall injury  12/5/2024 2114 by Patrick Turpin RN  Outcome: Progressing     Problem: Pain  Goal: Verbalizes/displays adequate comfort level or baseline comfort level  12/5/2024 2114 by Patrick Turpin RN  Outcome: Progressing     Problem: Nutrition Deficit:  Goal: Optimize nutritional status  12/5/2024 2114 by Patrick Turpin RN  Outcome: Progressing     Problem: Skin/Tissue Integrity - Adult  Goal: Skin integrity remains intact  12/5/2024 2114 by Patrick Turpin RN  Outcome: Progressing     Problem: Skin/Tissue Integrity - Adult  Goal: Incisions, wounds, or drain sites healing without S/S of infection  12/5/2024 2114 by Patrick Turpin RN  Outcome: Progressing     Problem: Musculoskeletal - Adult  Goal: Return mobility to safest level of function  12/5/2024 2114 by Patrick Turpin RN  Outcome: Progressing     Problem: Musculoskeletal - Adult  Goal: Maintain proper alignment of affected body part  12/5/2024 2114 by Patrick Turpin RN  Outcome: Progressing     
  Problem: Discharge Planning  Goal: Discharge to home or other facility with appropriate resources  12/7/2024 1022 by Niharika Boggs RN  Outcome: Progressing     Problem: Skin/Tissue Integrity  Goal: Absence of new skin breakdown  Description: 1.  Monitor for areas of redness and/or skin breakdown  2.  Assess vascular access sites hourly  3.  Every 4-6 hours minimum:  Change oxygen saturation probe site  4.  Every 4-6 hours:  If on nasal continuous positive airway pressure, respiratory therapy assess nares and determine need for appliance change or resting period.  12/7/2024 1022 by Niharika Boggs, RN  Outcome: Progressing     Problem: ABCDS Injury Assessment  Goal: Absence of physical injury  12/7/2024 1022 by Niharika Boggs RN  Outcome: Progressing     Problem: Safety - Adult  Goal: Free from fall injury  12/7/2024 1022 by Niharika Boggs RN  Outcome: Progressing     Problem: Pain  Goal: Verbalizes/displays adequate comfort level or baseline comfort level  12/7/2024 1022 by Niharika Boggs, RN  Outcome: Progressing     Problem: Nutrition Deficit:  Goal: Optimize nutritional status  12/7/2024 1022 by Niharika Boggs RN  Outcome: Progressing     Problem: Skin/Tissue Integrity - Adult  Goal: Skin integrity remains intact  12/7/2024 1022 by Niharika Boggs, RN  Outcome: Progressing     
  Problem: Discharge Planning  Goal: Discharge to home or other facility with appropriate resources  12/8/2024 1338 by Kerry Myers, RN  Outcome: Progressing  Flowsheets (Taken 12/8/2024 0942)  Discharge to home or other facility with appropriate resources: Identify barriers to discharge with patient and caregiver     Problem: Skin/Tissue Integrity  Goal: Absence of new skin breakdown  Description: 1.  Monitor for areas of redness and/or skin breakdown  2.  Assess vascular access sites hourly  3.  Every 4-6 hours minimum:  Change oxygen saturation probe site  4.  Every 4-6 hours:  If on nasal continuous positive airway pressure, respiratory therapy assess nares and determine need for appliance change or resting period.  12/8/2024 1338 by Kerry Myers, RN  Outcome: Progressing     Problem: ABCDS Injury Assessment  Goal: Absence of physical injury  12/8/2024 1338 by Kerry Myers, RN  Outcome: Progressing     Problem: Safety - Adult  Goal: Free from fall injury  12/8/2024 1338 by Kerry Myers, RN  Outcome: Progressing     Problem: Pain  Goal: Verbalizes/displays adequate comfort level or baseline comfort level  12/8/2024 1338 by Kerry Myers, RN  Outcome: Progressing     
  Problem: Discharge Planning  Goal: Discharge to home or other facility with appropriate resources  12/8/2024 2145 by Antoni Moon RN  Outcome: Progressing  Discharge to home or other facility with appropriate resources: Identify barriers to discharge with patient and caregiver     Problem: Skin/Tissue Integrity  Goal: Absence of new skin breakdown  Description: 1.  Monitor for areas of redness and/or skin breakdown  2.  Assess vascular access sites hourly  3.  Every 4-6 hours minimum:  Change oxygen saturation probe site  4.  Every 4-6 hours:  If on nasal continuous positive airway pressure, respiratory therapy assess nares and determine need for appliance change or resting period.  12/8/2024 2145 by Antoni Moon RN  Outcome: Progressing     Problem: ABCDS Injury Assessment  Goal: Absence of physical injury  12/8/2024 2145 by Antoni Moon RN  Outcome: Progressing     Problem: Safety - Adult  Goal: Free from fall injury  12/8/2024 2145 by Antoni Moon RN  Outcome: Progressing     Problem: Pain  Goal: Verbalizes/displays adequate comfort level or baseline comfort level  12/8/2024 2145 by Antoni Moon RN  Outcome: Progressing     Problem: Nutrition Deficit:  Goal: Optimize nutritional status  Outcome: Progressing     Problem: Skin/Tissue Integrity - Adult  Goal: Skin integrity remains intact  Outcome: Progressing  Goal: Incisions, wounds, or drain sites healing without S/S of infection  Outcome: Progressing     Problem: Musculoskeletal - Adult  Goal: Return mobility to safest level of function  Outcome: Progressing  Goal: Maintain proper alignment of affected body part  Outcome: Progressing     Problem: Gastrointestinal - Adult  Goal: Minimal or absence of nausea and vomiting  Outcome: Progressing  Minimal or absence of nausea and vomiting: Administer IV fluids as ordered to ensure adequate hydration  Goal: Maintains or returns to baseline bowel function  Outcome: Progressing  Goal: Maintains adequate 
  Problem: Discharge Planning  Goal: Discharge to home or other facility with appropriate resources  12/9/2024 0941 by Olga Ashely RN  Outcome: Progressing  Flowsheets (Taken 12/9/2024 0736)  Discharge to home or other facility with appropriate resources:   Identify barriers to discharge with patient and caregiver   Arrange for needed discharge resources and transportation as appropriate   Refer to discharge planning if patient needs post-hospital services based on physician order or complex needs related to functional status, cognitive ability or social support system  12/8/2024 2145 by Antoni Moon RN  Outcome: Progressing     Problem: Skin/Tissue Integrity  Goal: Absence of new skin breakdown  Description: 1.  Monitor for areas of redness and/or skin breakdown  2.  Assess vascular access sites hourly  3.  Every 4-6 hours minimum:  Change oxygen saturation probe site  4.  Every 4-6 hours:  If on nasal continuous positive airway pressure, respiratory therapy assess nares and determine need for appliance change or resting period.  12/9/2024 0941 by Olga Ashley RN  Outcome: Progressing  12/8/2024 2145 by Antoni Moon RN  Outcome: Progressing     Problem: ABCDS Injury Assessment  Goal: Absence of physical injury  12/9/2024 0941 by Olga Ashley RN  Outcome: Progressing  12/8/2024 2145 by Antoni Moon RN  Outcome: Progressing     Problem: Safety - Adult  Goal: Free from fall injury  12/9/2024 0941 by Olga Ashley RN  Outcome: Progressing  12/8/2024 2145 by Antoni Moon RN  Outcome: Progressing     Problem: Pain  Goal: Verbalizes/displays adequate comfort level or baseline comfort level  12/9/2024 0941 by Olga Ashley RN  Outcome: Progressing  12/8/2024 2145 by Antoni Moon RN  Outcome: Progressing     Problem: Nutrition Deficit:  Goal: Optimize nutritional status  12/9/2024 0941 by Olga Ashley RN  Outcome: Progressing  12/8/2024 2145 by Antoni Moon RN  Outcome: Progressing     Problem: 
  Problem: Discharge Planning  Goal: Discharge to home or other facility with appropriate resources  12/9/2024 2258 by Antoni Moon RN  Outcome: Progressing  Discharge to home or other facility with appropriate resources:   Identify barriers to discharge with patient and caregiver   Arrange for needed discharge resources and transportation as appropriate   Refer to discharge planning if patient needs post-hospital services based on physician order or complex needs related to functional status, cognitive ability or social support system     Problem: Skin/Tissue Integrity  Goal: Absence of new skin breakdown  Description: 1.  Monitor for areas of redness and/or skin breakdown  2.  Assess vascular access sites hourly  3.  Every 4-6 hours minimum:  Change oxygen saturation probe site  4.  Every 4-6 hours:  If on nasal continuous positive airway pressure, respiratory therapy assess nares and determine need for appliance change or resting period.  12/9/2024 2258 by Antoni Moon RN  Outcome: Progressing     Problem: ABCDS Injury Assessment  Goal: Absence of physical injury  12/9/2024 2258 by Antoni Moon RN  Outcome: Progressing     Problem: Safety - Adult  Goal: Free from fall injury  12/9/2024 2258 by Antoni Moon RN  Outcome: Progressing    Problem: Pain  Goal: Verbalizes/displays adequate comfort level or baseline comfort level  12/9/2024 2258 by Antoni Moon RN  Outcome: Progressing     Problem: Nutrition Deficit:  Goal: Optimize nutritional status  12/9/2024 2258 by Antoni Moon RN  Outcome: Progressing     Problem: Skin/Tissue Integrity - Adult  Goal: Skin integrity remains intact  12/9/2024 2258 by Antoni Moon RN  Outcome: Progressing  Skin Integrity Remains Intact:   Monitor for areas of redness and/or skin breakdown   Assess vascular access sites hourly   Every 4-6 hours minimum: Change oxygen saturation probe site   Every 4-6 hours: If on nasal continuous positive airway pressure, respiratory 
  Problem: Discharge Planning  Goal: Discharge to home or other facility with appropriate resources  Outcome: Progressing     Problem: Skin/Tissue Integrity  Goal: Absence of new skin breakdown  Description: 1.  Monitor for areas of redness and/or skin breakdown  2.  Assess vascular access sites hourly  3.  Every 4-6 hours minimum:  Change oxygen saturation probe site  4.  Every 4-6 hours:  If on nasal continuous positive airway pressure, respiratory therapy assess nares and determine need for appliance change or resting period.  Outcome: Progressing     Problem: ABCDS Injury Assessment  Goal: Absence of physical injury  Outcome: Progressing     Problem: Safety - Adult  Goal: Free from fall injury  11/24/2024 0913 by Lola Ohara, RN  Outcome: Progressing  11/24/2024 0141 by Yari Rae, RN  Outcome: Progressing     
  Problem: Discharge Planning  Goal: Discharge to home or other facility with appropriate resources  Outcome: Progressing     Problem: Skin/Tissue Integrity  Goal: Absence of new skin breakdown  Description: 1.  Monitor for areas of redness and/or skin breakdown  2.  Assess vascular access sites hourly  3.  Every 4-6 hours minimum:  Change oxygen saturation probe site  4.  Every 4-6 hours:  If on nasal continuous positive airway pressure, respiratory therapy assess nares and determine need for appliance change or resting period.  Outcome: Progressing     Problem: ABCDS Injury Assessment  Goal: Absence of physical injury  Outcome: Progressing     Problem: Safety - Adult  Goal: Free from fall injury  Outcome: Progressing     
  Problem: Discharge Planning  Goal: Discharge to home or other facility with appropriate resources  Outcome: Progressing     Problem: Skin/Tissue Integrity  Goal: Absence of new skin breakdown  Description: 1.  Monitor for areas of redness and/or skin breakdown  2.  Assess vascular access sites hourly  3.  Every 4-6 hours minimum:  Change oxygen saturation probe site  4.  Every 4-6 hours:  If on nasal continuous positive airway pressure, respiratory therapy assess nares and determine need for appliance change or resting period.  Outcome: Progressing     Problem: ABCDS Injury Assessment  Goal: Absence of physical injury  Outcome: Progressing     Problem: Safety - Adult  Goal: Free from fall injury  Outcome: Progressing     Problem: Pain  Goal: Verbalizes/displays adequate comfort level or baseline comfort level  Outcome: Progressing     Problem: Nutrition Deficit:  Goal: Optimize nutritional status  Outcome: Progressing     
  Problem: Discharge Planning  Goal: Discharge to home or other facility with appropriate resources  Outcome: Progressing     Problem: Skin/Tissue Integrity  Goal: Absence of new skin breakdown  Description: 1.  Monitor for areas of redness and/or skin breakdown  2.  Assess vascular access sites hourly  3.  Every 4-6 hours minimum:  Change oxygen saturation probe site  4.  Every 4-6 hours:  If on nasal continuous positive airway pressure, respiratory therapy assess nares and determine need for appliance change or resting period.  Outcome: Progressing     Problem: ABCDS Injury Assessment  Goal: Absence of physical injury  Outcome: Progressing     Problem: Safety - Adult  Goal: Free from fall injury  Outcome: Progressing     Problem: Pain  Goal: Verbalizes/displays adequate comfort level or baseline comfort level  Outcome: Progressing     Problem: Nutrition Deficit:  Goal: Optimize nutritional status  Outcome: Progressing     Problem: Skin/Tissue Integrity - Adult  Goal: Skin integrity remains intact  12/1/2024 2206 by May Shaffer RN  Outcome: Progressing    Goal: Incisions, wounds, or drain sites healing without S/S of infection  12/1/2024 2206 by May Shaffer RN  Outcome: Progressing       Problem: Musculoskeletal - Adult  Goal: Return mobility to safest level of function  12/1/2024 2206 by May Shaffer RN  Outcome: Progressing    Goal: Maintain proper alignment of affected body part  12/1/2024 2206 by May Shaffer RN  Outcome: Progressing       Problem: Gastrointestinal - Adult  Goal: Minimal or absence of nausea and vomiting  12/1/2024 2206 by May Shaffer RN  Outcome: Progressing    Goal: Maintains or returns to baseline bowel function  12/1/2024 2206 by May Shaffer RN  Outcome: Progressing    Goal: Maintains adequate nutritional intake  12/1/2024 2206 by May Shaffer RN  Outcome: Progressing       Problem: Neurosensory - Adult  Goal: Achieves stable or improved 
  Problem: Discharge Planning  Goal: Discharge to home or other facility with appropriate resources  Outcome: Progressing     Problem: Skin/Tissue Integrity  Goal: Absence of new skin breakdown  Description: 1.  Monitor for areas of redness and/or skin breakdown  2.  Assess vascular access sites hourly  3.  Every 4-6 hours minimum:  Change oxygen saturation probe site  4.  Every 4-6 hours:  If on nasal continuous positive airway pressure, respiratory therapy assess nares and determine need for appliance change or resting period.  Outcome: Progressing     Problem: ABCDS Injury Assessment  Goal: Absence of physical injury  Outcome: Progressing     Problem: Safety - Adult  Goal: Free from fall injury  Outcome: Progressing     Problem: Pain  Goal: Verbalizes/displays adequate comfort level or baseline comfort level  Outcome: Progressing     Problem: Nutrition Deficit:  Goal: Optimize nutritional status  Outcome: Progressing     Problem: Skin/Tissue Integrity - Adult  Goal: Skin integrity remains intact  12/2/2024 2208 by Vilma Dhaliwal RN  Outcome: Progressing  Flowsheets (Taken 12/2/2024 2207)  Skin Integrity Remains Intact: Monitor for areas of redness and/or skin breakdown  12/2/2024 0818 by Wandy Sherman, RN  Outcome: Progressing  Goal: Incisions, wounds, or drain sites healing without S/S of infection  12/2/2024 2208 by Vilma Dhaliwal RN  Outcome: Progressing  12/2/2024 0818 by Wandy Sherman, RN  Outcome: Progressing     Problem: Musculoskeletal - Adult  Goal: Return mobility to safest level of function  12/2/2024 2208 by Vilma Dhaliwal, RN  Outcome: Progressing  12/2/2024 0818 by Wandy Sherman, RN  Outcome: Progressing  Goal: Maintain proper alignment of affected body part  12/2/2024 2208 by Vilma Dhaliwal, RN  Outcome: Progressing  12/2/2024 0818 by Wandy Sherman, RN  Outcome: Progressing     Problem: Gastrointestinal - Adult  Goal: Minimal or absence of nausea and vomiting  12/2/2024 2208 by Isra 
  Problem: Discharge Planning  Goal: Discharge to home or other facility with appropriate resources  Outcome: Progressing     Problem: Skin/Tissue Integrity  Goal: Absence of new skin breakdown  Description: 1.  Monitor for areas of redness and/or skin breakdown  2.  Assess vascular access sites hourly  3.  Every 4-6 hours minimum:  Change oxygen saturation probe site  4.  Every 4-6 hours:  If on nasal continuous positive airway pressure, respiratory therapy assess nares and determine need for appliance change or resting period.  Outcome: Progressing     Problem: ABCDS Injury Assessment  Goal: Absence of physical injury  Outcome: Progressing     Problem: Safety - Adult  Goal: Free from fall injury  Outcome: Progressing     Problem: Pain  Goal: Verbalizes/displays adequate comfort level or baseline comfort level  Outcome: Progressing     Problem: Nutrition Deficit:  Goal: Optimize nutritional status  Outcome: Progressing     Problem: Skin/Tissue Integrity - Adult  Goal: Skin integrity remains intact  Outcome: Progressing  Flowsheets (Taken 12/7/2024 1930)  Skin Integrity Remains Intact: Monitor for areas of redness and/or skin breakdown  Goal: Incisions, wounds, or drain sites healing without S/S of infection  Outcome: Progressing     Problem: Skin/Tissue Integrity - Adult  Goal: Incisions, wounds, or drain sites healing without S/S of infection  Outcome: Progressing     Problem: Musculoskeletal - Adult  Goal: Return mobility to safest level of function  Outcome: Progressing  Goal: Maintain proper alignment of affected body part  Outcome: Progressing     Problem: Musculoskeletal - Adult  Goal: Maintain proper alignment of affected body part  Outcome: Progressing     Problem: Gastrointestinal - Adult  Goal: Minimal or absence of nausea and vomiting  Outcome: Progressing  Goal: Maintains or returns to baseline bowel function  Outcome: Progressing  Goal: Maintains adequate nutritional intake  Outcome: Progressing   
  Problem: Discharge Planning  Goal: Discharge to home or other facility with appropriate resources  Outcome: Progressing     Problem: Skin/Tissue Integrity  Goal: Absence of new skin breakdown  Description: 1.  Monitor for areas of redness and/or skin breakdown  2.  Assess vascular access sites hourly  3.  Every 4-6 hours minimum:  Change oxygen saturation probe site  4.  Every 4-6 hours:  If on nasal continuous positive airway pressure, respiratory therapy assess nares and determine need for appliance change or resting period.  Outcome: Progressing     Problem: ABCDS Injury Assessment  Goal: Absence of physical injury  Outcome: Progressing     Problem: Safety - Adult  Goal: Free from fall injury  Outcome: Progressing     Problem: Pain  Goal: Verbalizes/displays adequate comfort level or baseline comfort level  Outcome: Progressing     Problem: Nutrition Deficit:  Goal: Optimize nutritional status  Outcome: Progressing     Problem: Skin/Tissue Integrity - Adult  Goal: Skin integrity remains intact  Outcome: Progressing  Goal: Incisions, wounds, or drain sites healing without S/S of infection  Outcome: Progressing     Problem: Musculoskeletal - Adult  Goal: Return mobility to safest level of function  Outcome: Progressing  Goal: Maintain proper alignment of affected body part  Outcome: Progressing     Problem: Gastrointestinal - Adult  Goal: Minimal or absence of nausea and vomiting  Outcome: Progressing  Goal: Maintains or returns to baseline bowel function  Outcome: Progressing  Goal: Maintains adequate nutritional intake  Outcome: Progressing     
  Problem: Discharge Planning  Goal: Discharge to home or other facility with appropriate resources  Outcome: Progressing     Problem: Skin/Tissue Integrity  Goal: Absence of new skin breakdown  Description: 1.  Monitor for areas of redness and/or skin breakdown  2.  Assess vascular access sites hourly  3.  Every 4-6 hours minimum:  Change oxygen saturation probe site  4.  Every 4-6 hours:  If on nasal continuous positive airway pressure, respiratory therapy assess nares and determine need for appliance change or resting period.  Outcome: Progressing     Problem: ABCDS Injury Assessment  Goal: Absence of physical injury  Outcome: Progressing     Problem: Safety - Adult  Goal: Free from fall injury  Outcome: Progressing     Problem: Pain  Goal: Verbalizes/displays adequate comfort level or baseline comfort level  Outcome: Progressing     Problem: Nutrition Deficit:  Goal: Optimize nutritional status  Outcome: Progressing     Problem: Skin/Tissue Integrity - Adult  Goal: Skin integrity remains intact  Outcome: Progressing  Goal: Incisions, wounds, or drain sites healing without S/S of infection  Outcome: Progressing     Problem: Musculoskeletal - Adult  Goal: Return mobility to safest level of function  Outcome: Progressing  Goal: Maintain proper alignment of affected body part  Outcome: Progressing     Problem: Gastrointestinal - Adult  Goal: Minimal or absence of nausea and vomiting  Outcome: Progressing  Goal: Maintains or returns to baseline bowel function  Outcome: Progressing  Goal: Maintains adequate nutritional intake  Outcome: Progressing     Problem: Neurosensory - Adult  Goal: Achieves stable or improved neurological status  Outcome: Progressing     Problem: Infection - Adult  Goal: Absence of infection at discharge  Outcome: Progressing  Goal: Absence of infection during hospitalization  Outcome: Progressing     
  Problem: Discharge Planning  Goal: Discharge to home or other facility with appropriate resources  Outcome: Progressing     Problem: Skin/Tissue Integrity  Goal: Absence of new skin breakdown  Description: 1.  Monitor for areas of redness and/or skin breakdown  2.  Assess vascular access sites hourly  3.  Every 4-6 hours minimum:  Change oxygen saturation probe site  4.  Every 4-6 hours:  If on nasal continuous positive airway pressure, respiratory therapy assess nares and determine need for appliance change or resting period.  Outcome: Progressing     Problem: ABCDS Injury Assessment  Goal: Absence of physical injury  Outcome: Progressing     Problem: Safety - Adult  Goal: Free from fall injury  Outcome: Progressing     Problem: Pain  Goal: Verbalizes/displays adequate comfort level or baseline comfort level  Outcome: Progressing     Problem: Nutrition Deficit:  Goal: Optimize nutritional status  Outcome: Progressing     Problem: Skin/Tissue Integrity - Adult  Goal: Skin integrity remains intact  Outcome: Progressing  Goal: Incisions, wounds, or drain sites healing without S/S of infection  Outcome: Progressing     Problem: Skin/Tissue Integrity - Adult  Goal: Incisions, wounds, or drain sites healing without S/S of infection  Outcome: Progressing     Problem: Musculoskeletal - Adult  Goal: Return mobility to safest level of function  Outcome: Progressing  Goal: Maintain proper alignment of affected body part  Outcome: Progressing     Problem: Musculoskeletal - Adult  Goal: Maintain proper alignment of affected body part  Outcome: Progressing     Problem: Gastrointestinal - Adult  Goal: Minimal or absence of nausea and vomiting  Outcome: Progressing  Goal: Maintains or returns to baseline bowel function  Outcome: Progressing  Goal: Maintains adequate nutritional intake  Outcome: Progressing     Problem: Gastrointestinal - Adult  Goal: Maintains or returns to baseline bowel function  Outcome: Progressing   
  Problem: Discharge Planning  Goal: Discharge to home or other facility with appropriate resources  Outcome: Progressing     Problem: Skin/Tissue Integrity  Goal: Absence of new skin breakdown  Description: 1.  Monitor for areas of redness and/or skin breakdown  2.  Assess vascular access sites hourly  3.  Every 4-6 hours minimum:  Change oxygen saturation probe site  4.  Every 4-6 hours:  If on nasal continuous positive airway pressure, respiratory therapy assess nares and determine need for appliance change or resting period.  Outcome: Progressing     Problem: ABCDS Injury Assessment  Goal: Absence of physical injury  Outcome: Progressing     Problem: Safety - Adult  Goal: Free from fall injury  Outcome: Progressing     Problem: Pain  Goal: Verbalizes/displays adequate comfort level or baseline comfort level  Outcome: Progressing     Problem: Nutrition Deficit:  Goal: Optimize nutritional status  Outcome: Progressing  Flowsheets (Taken 11/25/2024 1048 by Anjali Krishnan, RD, LD)  Nutrient intake appropriate for improving, restoring, or maintaining nutritional needs:   Assess nutritional status and recommend course of action   Recommend, monitor, and adjust tube feedings and TPN/PPN based on assessed needs     
  Problem: Discharge Planning  Goal: Discharge to home or other facility with appropriate resources  Outcome: Progressing     Problem: Skin/Tissue Integrity  Goal: Absence of new skin breakdown  Description: 1.  Monitor for areas of redness and/or skin breakdown  2.  Assess vascular access sites hourly  3.  Every 4-6 hours minimum:  Change oxygen saturation probe site  4.  Every 4-6 hours:  If on nasal continuous positive airway pressure, respiratory therapy assess nares and determine need for appliance change or resting period.  Outcome: Progressing     Problem: ABCDS Injury Assessment  Goal: Absence of physical injury  Outcome: Progressing     Problem: Safety - Adult  Goal: Free from fall injury  Outcome: Progressing     Problem: Pain  Goal: Verbalizes/displays adequate comfort level or baseline comfort level  Outcome: Progressing     Problem: Nutrition Deficit:  Goal: Optimize nutritional status  Outcome: Progressing  Flowsheets (Taken 11/29/2024 0545 by Nabila Álvarez, MS, RD, LD)  Nutrient intake appropriate for improving, restoring, or maintaining nutritional needs:   Assess nutritional status and recommend course of action   Recommend, monitor, and adjust tube feedings and TPN/PPN based on assessed needs     Problem: Skin/Tissue Integrity - Adult  Goal: Skin integrity remains intact  Outcome: Progressing  Goal: Incisions, wounds, or drain sites healing without S/S of infection  Outcome: Progressing     Problem: Gastrointestinal - Adult  Goal: Minimal or absence of nausea and vomiting  Outcome: Progressing  Goal: Maintains or returns to baseline bowel function  Outcome: Progressing  Goal: Maintains adequate nutritional intake  Outcome: Progressing     
  Problem: Discharge Planning  Goal: Discharge to home or other facility with appropriate resources  Outcome: Progressing     Problem: Skin/Tissue Integrity  Goal: Absence of new skin breakdown  Description: 1.  Monitor for areas of redness and/or skin breakdown  2.  Assess vascular access sites hourly  3.  Every 4-6 hours minimum:  Change oxygen saturation probe site  4.  Every 4-6 hours:  If on nasal continuous positive airway pressure, respiratory therapy assess nares and determine need for appliance change or resting period.  Outcome: Progressing     Problem: ABCDS Injury Assessment  Goal: Absence of physical injury  Outcome: Progressing     Problem: Safety - Adult  Goal: Free from fall injury  Outcome: Progressing     Problem: Pain  Goal: Verbalizes/displays adequate comfort level or baseline comfort level  Outcome: Progressing     Problem: Skin/Tissue Integrity - Adult  Goal: Skin integrity remains intact  12/3/2024 2222 by Patrick Turpin RN  Outcome: Progressing     
  Problem: Discharge Planning  Goal: Discharge to home or other facility with appropriate resources  Outcome: Progressing     Problem: Skin/Tissue Integrity  Goal: Absence of new skin breakdown  Description: 1.  Monitor for areas of redness and/or skin breakdown  2.  Assess vascular access sites hourly  3.  Every 4-6 hours minimum:  Change oxygen saturation probe site  4.  Every 4-6 hours:  If on nasal continuous positive airway pressure, respiratory therapy assess nares and determine need for appliance change or resting period.  Outcome: Progressing     Problem: ABCDS Injury Assessment  Goal: Absence of physical injury  Outcome: Progressing     Problem: Safety - Adult  Goal: Free from fall injury  Outcome: Progressing     Problem: Pain  Goal: Verbalizes/displays adequate comfort level or baseline comfort level  Outcome: Progressing  Flowsheets (Taken 11/27/2024 0914 by Wandy Sherman RN)  Verbalizes/displays adequate comfort level or baseline comfort level:   Encourage patient to monitor pain and request assistance   Assess pain using appropriate pain scale   Administer analgesics based on type and severity of pain and evaluate response     Problem: Nutrition Deficit:  Goal: Optimize nutritional status  Outcome: Progressing     Problem: Skin/Tissue Integrity - Adult  Goal: Skin integrity remains intact  11/27/2024 2220 by Keith Nguyen RN  Outcome: Progressing  11/27/2024 0943 by Wandy Sherman RN  Outcome: Progressing  Goal: Incisions, wounds, or drain sites healing without S/S of infection  11/27/2024 2220 by Keith Nguyen, RN  Outcome: Progressing  11/27/2024 0943 by Wandy Sherman RN  Outcome: Progressing     Problem: Musculoskeletal - Adult  Goal: Return mobility to safest level of function  11/27/2024 2220 by Keith Nguyen RN  Outcome: Progressing  11/27/2024 0943 by Wandy Sherman RN  Outcome: Progressing  Goal: Maintain proper alignment of affected body part  11/27/2024 2220 by Patrick 
  Problem: Discharge Planning  Goal: Discharge to home or other facility with appropriate resources  Outcome: Progressing  Flowsheets (Taken 12/4/2024 4156 by Garrison Michael RN)  Discharge to home or other facility with appropriate resources: Identify barriers to discharge with patient and caregiver     Problem: Skin/Tissue Integrity  Goal: Absence of new skin breakdown  Description: 1.  Monitor for areas of redness and/or skin breakdown  2.  Assess vascular access sites hourly  3.  Every 4-6 hours minimum:  Change oxygen saturation probe site  4.  Every 4-6 hours:  If on nasal continuous positive airway pressure, respiratory therapy assess nares and determine need for appliance change or resting period.  Outcome: Progressing     Problem: ABCDS Injury Assessment  Goal: Absence of physical injury  Outcome: Progressing     Problem: Safety - Adult  Goal: Free from fall injury  Outcome: Progressing     Problem: Pain  Goal: Verbalizes/displays adequate comfort level or baseline comfort level  Outcome: Progressing     Problem: Nutrition Deficit:  Goal: Optimize nutritional status  Outcome: Progressing     Problem: Skin/Tissue Integrity - Adult  Goal: Incisions, wounds, or drain sites healing without S/S of infection  Outcome: Progressing     Problem: Musculoskeletal - Adult  Goal: Return mobility to safest level of function  Outcome: Progressing     Problem: Musculoskeletal - Adult  Goal: Maintain proper alignment of affected body part  Outcome: Progressing     
  Problem: Gastrointestinal - Adult  Goal: Minimal or absence of nausea and vomiting  11/28/2024 0959 by Wandy Sherman RN  Outcome: Progressing  11/27/2024 2220 by Keith Nguyen RN  Outcome: Progressing  Goal: Maintains or returns to baseline bowel function  11/28/2024 0959 by Wandy Sherman RN  Outcome: Progressing  11/27/2024 2220 by Keith Nguyen RN  Outcome: Progressing  Goal: Maintains adequate nutritional intake  11/28/2024 0959 by Wandy Sherman RN  Outcome: Progressing  11/27/2024 2220 by Keith Nguyen RN  Outcome: Progressing     Problem: Musculoskeletal - Adult  Goal: Return mobility to safest level of function  11/28/2024 0959 by Wandy Sherman RN  Outcome: Progressing  11/27/2024 2220 by Keith Nguyen RN  Outcome: Progressing  Goal: Maintain proper alignment of affected body part  11/28/2024 0959 by Wandy Sherman RN  Outcome: Progressing  11/27/2024 2220 by Keith Nguyen RN  Outcome: Progressing     Problem: Skin/Tissue Integrity - Adult  Goal: Skin integrity remains intact  11/28/2024 0959 by Wandy Sherman RN  Outcome: Progressing  11/27/2024 2220 by Keith Nguyen RN  Outcome: Progressing  Goal: Incisions, wounds, or drain sites healing without S/S of infection  11/28/2024 0959 by Wandy Sherman RN  Outcome: Progressing  11/27/2024 2220 by Keith Nguyen RN  Outcome: Progressing     Problem: Nutrition Deficit:  Goal: Optimize nutritional status  11/27/2024 2220 by Keith Nguyen RN  Outcome: Progressing     
  Problem: Gastrointestinal - Adult  Goal: Minimal or absence of nausea and vomiting  Outcome: Progressing  Goal: Maintains or returns to baseline bowel function  Outcome: Progressing  Goal: Maintains adequate nutritional intake  Outcome: Progressing     Problem: Musculoskeletal - Adult  Goal: Return mobility to safest level of function  Outcome: Progressing  Goal: Maintain proper alignment of affected body part  Outcome: Progressing     Problem: Skin/Tissue Integrity - Adult  Goal: Skin integrity remains intact  Outcome: Progressing  Goal: Incisions, wounds, or drain sites healing without S/S of infection  Outcome: Progressing     
  Problem: Gastrointestinal - Adult  Goal: Minimal or absence of nausea and vomiting  Outcome: Progressing  Goal: Maintains or returns to baseline bowel function  Outcome: Progressing  Goal: Maintains adequate nutritional intake  Outcome: Progressing     Problem: Musculoskeletal - Adult  Goal: Return mobility to safest level of function  Outcome: Progressing  Goal: Maintain proper alignment of affected body part  Outcome: Progressing     Problem: Skin/Tissue Integrity - Adult  Goal: Skin integrity remains intact  Outcome: Progressing  Goal: Incisions, wounds, or drain sites healing without S/S of infection  Outcome: Progressing     Problem: Nutrition Deficit:  Goal: Optimize nutritional status  11/26/2024 2251 by Alecia Saldana RN  Outcome: Progressing     Problem: Pain  Goal: Verbalizes/displays adequate comfort level or baseline comfort level  Recent Flowsheet Documentation  Taken 11/27/2024 0914 by Wandy Sherman RN  Verbalizes/displays adequate comfort level or baseline comfort level:   Encourage patient to monitor pain and request assistance   Assess pain using appropriate pain scale   Administer analgesics based on type and severity of pain and evaluate response  11/26/2024 2251 by Alecia Saldana RN  Outcome: Progressing     
  Problem: Infection - Adult  Goal: Absence of infection at discharge  Outcome: Progressing  Goal: Absence of infection during hospitalization  Outcome: Progressing     Problem: Neurosensory - Adult  Goal: Achieves stable or improved neurological status  Outcome: Progressing     Problem: Gastrointestinal - Adult  Goal: Minimal or absence of nausea and vomiting  Outcome: Progressing  Goal: Maintains or returns to baseline bowel function  Outcome: Progressing  Goal: Maintains adequate nutritional intake  Outcome: Progressing     Problem: Musculoskeletal - Adult  Goal: Return mobility to safest level of function  Outcome: Progressing  Goal: Maintain proper alignment of affected body part  Outcome: Progressing     Problem: Skin/Tissue Integrity - Adult  Goal: Skin integrity remains intact  Outcome: Progressing  Goal: Incisions, wounds, or drain sites healing without S/S of infection  Outcome: Progressing     
  Problem: Safety - Adult  Goal: Free from fall injury  11/24/2024 0141 by Yari Rae, RN  Outcome: Progressing  
Deficit:  Goal: Optimize nutritional status  12/1/2024 2206 by May Shaffer, RN  Outcome: Progressing     
Progressing  12/2/2024 2208 by Vilma Dhaliwal, RN  Outcome: Progressing     Problem: Nutrition Deficit:  Goal: Optimize nutritional status  12/2/2024 2208 by Vilma Dhaliwal RN  Outcome: Progressing     Problem: Pain  Goal: Verbalizes/displays adequate comfort level or baseline comfort level  Recent Flowsheet Documentation  Taken 12/3/2024 0815 by Wandy Sherman RN  Verbalizes/displays adequate comfort level or baseline comfort level:   Encourage patient to monitor pain and request assistance   Assess pain using appropriate pain scale   Administer analgesics based on type and severity of pain and evaluate response  12/2/2024 2208 by Vilma Dhaliwal, RN  Outcome: Progressing     Problem: Safety - Adult  Goal: Free from fall injury  12/2/2024 2208 by Vilma Dhaliwal, RN  Outcome: Progressing

## 2024-12-10 NOTE — H&P
Gastroenterology Note             Pre-operative History and Physical    Patient: Kevin Blair  : 1935  CSN:     History Obtained From:  patient and/or guardian.     HISTORY OF PRESENT ILLNESS:    The patient is a 89 y.o. male  here for EGD. Dark stools.    Past Medical History:    Past Medical History:   Diagnosis Date    Arthritis     Diverticulitis     Hyperlipidemia     Hypertension     S/P colon resection      Past Surgical History:    Past Surgical History:   Procedure Laterality Date    CARPAL TUNNEL RELEASE      bilat    CATARACT REMOVAL      right eye    COLECTOMY      KNEE ARTHROSCOPY      right    LAPAROTOMY N/A 2024    EXPLORATORY LAPAROTOMY performed by Nghia Sheridan MD at [facility] OR    SHOULDER SURGERY      left    SMALL INTESTINE SURGERY N/A 2024    LAPAROTOMY EXPLORATORY, LYSIS OF ADHESIONS, SMALL BOWEL RESECTION performed by Nghia Sheridan MD at [facility] OR    SMALL INTESTINE SURGERY N/A 2024    SMALL BOWEL RESECTION performed by Nghia Sheridan MD at [facility] OR    TONSILLECTOMY      TOTAL HIP ARTHROPLASTY  2012    LEFT TOTAL HIP ARTHROPLASTY ANTERIOR APPROACH    TURP       Medications Prior to Admission:   No current facility-administered medications on file prior to encounter.     Current Outpatient Medications on File Prior to Encounter   Medication Sig Dispense Refill    potassium chloride (K-TAB) 20 MEQ TBCR extended release tablet Take 1 tablet by mouth daily      venlafaxine (EFFEXOR XR) 150 MG extended release capsule Take 1 capsule by mouth daily      atorvastatin (LIPITOR) 20 MG tablet Take 1 tablet by mouth every evening      metFORMIN (GLUCOPHAGE) 1000 MG tablet Take 1 tablet by mouth Daily with supper      lisinopril (PRINIVIL;ZESTRIL) 20 MG tablet Take 1 tablet by mouth daily          Allergies:  Patient has no known allergies.      Social History:   Social History     Tobacco Use    Smoking status: Former     Current packs/day:

## 2024-12-10 NOTE — ANESTHESIA PRE PROCEDURE
Department of Anesthesiology  Preprocedure Note       Name:  Kevin Blair   Age:  89 y.o.  :  1935                                          MRN:  [id number]         Date:  12/10/2024      Surgeon: Surgeon(s):  Elio Loera MD    Procedure: Procedure(s):  ESOPHAGOGASTRODUODENOSCOPY DIAGNOSTIC ONLY    Medications prior to admission:   Prior to Admission medications    Medication Sig Start Date End Date Taking? Authorizing Provider   potassium chloride (K-TAB) 20 MEQ TBCR extended release tablet Take 1 tablet by mouth daily   Yes Provider, MD Karley   venlafaxine (EFFEXOR XR) 150 MG extended release capsule Take 1 capsule by mouth daily   Yes Provider, MD Karley   atorvastatin (LIPITOR) 20 MG tablet Take 1 tablet by mouth every evening   Yes Provider, MD Karley   metFORMIN (GLUCOPHAGE) 1000 MG tablet Take 1 tablet by mouth Daily with supper   Yes Provider, MD Karley   lisinopril (PRINIVIL;ZESTRIL) 20 MG tablet Take 1 tablet by mouth daily   Yes Provider, MD Karley       Current medications:    Current Facility-Administered Medications   Medication Dose Route Frequency Provider Last Rate Last Admin   • 0.9 % sodium chloride infusion   IntraVENous PRN Ricardo Marcial MD       • pantoprazole (PROTONIX) injection 40 mg  40 mg IntraVENous BID Susy Ashraf APRN - CNP   40 mg at 24   • ALTEplase (CATHFLO) injection 2 mg  2 mg IntraCATHeter PRN Navi Aviles DO   2 mg at 24 0419   • diatrizoate meglumine-sodium (GASTROGRAFIN) 66-10 % solution 20 mL  20 mL Oral ONCE PRN Nghia Sheridan MD   20 mL at 24 1511   • 0.9 % sodium chloride infusion   IntraVENous PRN Traci Navi, DO       • insulin lispro (HUMALOG,ADMELOG) injection vial 0-8 Units  0-8 Units SubCUTAneous 4x Daily AC & HS Graham Bishop MD   2 Units at 24 0842   • thiamine (B-1) injection 100 mg  100 mg IntraVENous Daily Татьяна Schmitz MD   100 mg at 24 1005   • phenol 1.4 %

## 2024-12-10 NOTE — ANESTHESIA POSTPROCEDURE EVALUATION
Department of Anesthesiology  Postprocedure Note    Patient: Kevin Blair  MRN: [id number]  YOB: 1935  Date of evaluation: 12/10/2024    Procedure Summary       Date: 12/10/24 Room / Location: [place] [address] / [facility]    Anesthesia Start: 0909 Anesthesia Stop: 0931    Procedure: ESOPHAGOGASTRODUODENOSCOPY BIOPSY (Abdomen) Diagnosis:       Dark stools      (Dark stools [R19.5])    Surgeons: Elio Loera MD Responsible Provider: Ronny Carter DO    Anesthesia Type: MAC ASA Status: 3            Anesthesia Type: No value filed.    Leia Phase I: Leia Score: 4    Leia Phase II:      Anesthesia Post Evaluation    Patient location during evaluation: PACU  Patient participation: complete - patient participated  Level of consciousness: awake  Airway patency: patent  Nausea & Vomiting: no nausea  Cardiovascular status: hemodynamically stable  Respiratory status: acceptable  Hydration status: euvolemic  Multimodal analgesia pain management approach  Pain management: adequate    No notable events documented.

## 2024-12-10 NOTE — DISCHARGE INSTR - COC
Continuity of Care Form    Patient Name: Kevin Blair   :  1935  MRN:  [id number]    Admit date:  2024  Discharge date:  ***    Code Status Order: Full Code   Advance Directives:   Advance Care Flowsheet Documentation        Date/Time Healthcare Directive Type of Healthcare Directive Copy in Chart Healthcare Agent Appointed Healthcare Agent's Name Healthcare Agent's Phone Number    12/10/24 0848 No, patient does not have an advance directive for healthcare treatment  --  --  --  --  --     24 1018 No, patient does not have an advance directive for healthcare treatment  --  --  --  --  --                     Admitting Physician:  Sada Ny MD  PCP: Blane Callahan MD    Discharging Nurse: ***  Discharging Hospital Unit/Room#: 4TN-4463/4463-01  Discharging Unit Phone Number: ***    Emergency Contact:   Extended Emergency Contact Information  Primary Emergency Contact: HOSLER,LISA  Mobile Phone: [phone]  Relation: Child  Secondary Emergency Contact: Anjali Moore  Work Phone: [phone]  Mobile Phone: [phone]  Relation: Child  Preferred language: English   needed? No    Past Surgical History:  Past Surgical History:   Procedure Laterality Date    CARPAL TUNNEL RELEASE      bilat    CATARACT REMOVAL      right eye    COLECTOMY      KNEE ARTHROSCOPY      right    LAPAROTOMY N/A 2024    EXPLORATORY LAPAROTOMY performed by Nghia Sheridan MD at [facility] OR    SHOULDER SURGERY      left    SMALL INTESTINE SURGERY N/A 2024    LAPAROTOMY EXPLORATORY, LYSIS OF ADHESIONS, SMALL BOWEL RESECTION performed by Nghia Sheridan MD at [facility] OR    SMALL INTESTINE SURGERY N/A 2024    SMALL BOWEL RESECTION performed by Nghia Sheridan MD at [facility] OR    TONSILLECTOMY      TOTAL HIP ARTHROPLASTY  2012    LEFT TOTAL HIP ARTHROPLASTY ANTERIOR APPROACH    TURP      UPPER GASTROINTESTINAL ENDOSCOPY N/A 12/10/2024    ESOPHAGOGASTRODUODENOSCOPY BIOPSY

## 2024-12-10 NOTE — PROGRESS NOTES
HOSPITALISTS PROGRESS NOTE    11/23/2024 9:47 AM        Name: Kevin Blair .              Admitted: 11/22/2024  Primary Care Provider: Blane Callahan MD (Tel: [phone])      Brief Course: This 89-year-old male with PMHx of hypertension, hyperlipidemia, s/p ex lap & small bowel resection for volvulus and ischemic bowel on 11/7/24 who presented with green drainage from open abdominal wound.    Interval history:   Pt seen and examined today   Overnight events noted and interval ancillary notes reviewed.  On room air satting well.  Afebrile night, WBCs WNL.  Resting in bed; reported that his mouth is very dry and asking for better swabs.  Denied any fever, chills, nausea, vomiting, abdominal pain      Assessment & Plan:       Enterocutaneous fistula; presented with worsening open wound drainage  S/p ex lap and small bowel resection for volvulus and ischemic bowel on 11/7/24 by general surgery.   General Surgery on board; wound VAC removed Open fistula visible in wound.  Wound cleaned &dressing changed.  Plan to keep NPO.  PICC line ordered for TPN.  Continue Zosyn.  Monitor for drainage from wound.  Plan for exploration and closure if continues to have extensive drainage      Hypertension; continue current regimen monitor BP closely    Hyperlipidemia; continue statins      DVT PPX: Lovenox  Code:Full Code    Disposition: Once acute medical issues have resolved    Current Medications  atorvastatin (LIPITOR) tablet 20 mg, QPM  lisinopril (PRINIVIL;ZESTRIL) tablet 20 mg, Daily  venlafaxine (EFFEXOR XR) extended release capsule 150 mg, Daily  sodium chloride flush 0.9 % injection 5-40 mL, 2 times per day  sodium chloride flush 0.9 % injection 5-40 mL, PRN  0.9 % sodium chloride infusion, PRN  potassium chloride (KLOR-CON M) extended release tablet 40 mEq, PRN   Or  potassium bicarb-citric acid (EFFER-K) effervescent tablet 40 mEq, PRN   
                                                                      HOSPITALISTS PROGRESS NOTE    11/24/2024 9:20 AM        Name: Kevin Blair .              Admitted: 11/22/2024  Primary Care Provider: Blane Callahan MD (Tel: [phone])      Brief Course: This 89-year-old male with PMHx of hypertension, hyperlipidemia, s/p ex lap & small bowel resection for volvulus and ischemic bowel on 11/7/24 who presented with green drainage from open abdominal wound.    Interval history:   Pt seen and examined today.  Overnight events noted, interval ancillary notes and labs reviewed.   On RA satting well.  Afebrile overnight, WBCs WNL.  Low BG this morning; corrected with D5 W.  Started on TPN  Resting in bed; reported his is feeling hungry and his mouth is dry.    Denied any fever, chills, SOB, chest pain, nausea, vomiting or abdominal pain        Assessment & Plan:       Enterocutaneous fistula; presented with worsening open wound drainage  S/p ex lap and small bowel resection for volvulus and ischemic bowel on 11/7/24 by general surgery.   General Surgery on board; wound VAC removed Open fistula visible in wound.  Wound cleaned &dressing changed.  Plan to keep NPO.  PICC line ordered for TPN.  Continue Zosyn.  Monitor for drainage from wound.  Plan for exploration and closure if continues to have extensive drainage      Hypertension; continue current regimen monitor BP closely    Hyperlipidemia; continue statins      DVT PPX: Lovenox  Code:Full Code    Disposition: Once acute medical issues have resolved    Current Medications  sodium chloride flush 0.9 % injection 5-40 mL, 2 times per day  sodium chloride flush 0.9 % injection 5-40 mL, PRN  0.9 % sodium chloride infusion, PRN  sodium chloride flush 0.9 % injection 5-40 mL, 2 times per day  sodium chloride flush 0.9 % injection 5-40 mL, PRN  0.9 % sodium chloride infusion, PRN  atorvastatin (LIPITOR) tablet 20 mg, QPM  lisinopril (PRINIVIL;ZESTRIL) tablet 20 mg, 
                                                                      HOSPITALISTS PROGRESS NOTE    11/25/2024 9:13 AM        Name: Kevin Blair .              Admitted: 11/22/2024  Primary Care Provider: Blane Callahan MD (Tel: [phone])      Brief Course: This 89-year-old male with PMHx of hypertension, hyperlipidemia, s/p ex lap & small bowel resection for volvulus and ischemic bowel on 11/7/24 who presented with green drainage from open abdominal wound.    Interval history:   Pt seen and examined today.  Overnight events noted, interval ancillary notes and labs reviewed.   On RA satting well.  Hemodynamically stable.  Started on TPN yesterday.  Low potassium this morning.  Replacement ordered.  Check pot level after replacement  Elevated serum sodium level this morning.  Free water flushes added per nephrology recs  Resting bed; denied any fever, chills, chest pain, palpitations nausea or vomiting  Plan for ex lap/small bowel resection today        Assessment & Plan:       Enterocutaneous fistula; presented with worsening open wound drainage  S/p ex lap and small bowel resection for volvulus and ischemic bowel on 11/7/24 by general surgery.   General Surgery on board; wound VAC removed Open fistula visible in wound.  Wound cleaned &dressing changed.  Keep NPO.  Started on TPN.  Continue Zosyn. Monitor for drainage from wound.   Plan for ex lap/small bowel resection today    Hyponatremia; likely 2/2 poor water intake. Patient currently on TPN.    Nephrology consulted; fever to boluses with tube feeds ordered.  Monitor serum sodium closely    Hypokalemia; monitor potassium closely and replace as needed      Hypertension; continue current regimen monitor BP closely    Hyperlipidemia; continue statins      DVT PPX: Lovenox  Code:Full Code    Disposition: Once acute medical issues have resolved    Current Medications  fat emulsion (INTRALIPID/NUTRILIPID) 20 % infusion 250 mL, Once per day on Monday 
                                                                      HOSPITALISTS PROGRESS NOTE    11/28/2024 12:48 PM        Name: Kevin Blair .              Admitted: 11/22/2024  Primary Care Provider: Blane Callahan MD (Tel: [phone])      Brief Course: This 89-year-old male with PMHx of hypertension, hyperlipidemia, s/p ex lap & small bowel resection for volvulus and ischemic bowel on 11/7/24 admitted for enterocolic fistula; s/p lysis of adhesion and small bowel resection with anastomosis.  Currently on TPN.  Will need wound VAC        Interval history:   Pt seen and examined today.  Is not passing gas no bowel movement  Wound VAC in place  NG tube in place  Denies any chest pain shortness of breath      Current Medications  Reviewed     Objective:  /68   Pulse 85   Temp 97.3 °F (36.3 °C) (Axillary)   Resp 16   Ht 1.74 m (5' 8.5\")   Wt 49.1 kg (108 lb 3.9 oz)   SpO2 97%   BMI 16.22 kg/m²     Intake/Output Summary (Last 24 hours) at 11/28/2024 1248  Last data filed at 11/28/2024 0845  Gross per 24 hour   Intake 7195.14 ml   Output 2100 ml   Net 5095.14 ml      Wt Readings from Last 3 Encounters:   11/24/24 49.1 kg (108 lb 3.9 oz)   11/22/24 53.5 kg (118 lb)   11/13/24 59.9 kg (132 lb)       Physical Examination:   Vitals:    11/28/24 0845   BP: 132/68   Pulse: 85   Resp: 16   Temp: 97.3 °F (36.3 °C)   SpO2: 97%       General appearance:  No apparent distress, appears stated age and cooperative.  Cardiovascular: Regular rate and rhythm, normal S1, S2. No murmur.   Respiratory:Clear to auscultation bilaterally, no wheeze or crackles.   GI: Abdomen  not distended, Dressing in place with wound vac   Musculoskeletal: No cyanosis in digits.  No BLE edema present  Neurology: CN 2-12 grossly intact. No speech or motor deficits      Labs and Tests:  CBC:   Recent Labs     11/26/24  0409 11/27/24  0437 11/28/24  0340   WBC 10.9 9.6 7.1   HGB 10.0* 9.3* 8.0*   * [phone]     BMP:    Recent 
                                                                      HOSPITALISTS PROGRESS NOTE    12/1/2024 12:31 PM        Name: Kevin Blair .              Admitted: 11/22/2024  Primary Care Provider: Blane Callahan MD (Tel: [phone])      Brief Course: This 89-year-old male with PMHx of hypertension, hyperlipidemia, s/p ex lap & small bowel resection for volvulus and ischemic bowel on 11/7/24 admitted for enterocolic fistula; s/p lysis of adhesion and small bowel resection with anastomosis.  Currently on TPN. Also has a wound vac. Had complex chandler placed in OR for false passage.         Interval history:   Pt seen and examined today. Feeling ok. Passing soft-loose stools. Passing stool from rectum when he is trying to urinate. Tolerating clear liquids. He is know to have severe dysphagia based on speech eval during last admission.       Current Medications  Reviewed     Objective:  /63   Pulse 67   Temp 97.5 °F (36.4 °C) (Oral)   Resp 16   Ht 1.74 m (5' 8.5\")   Wt 49.1 kg (108 lb 3.9 oz)   SpO2 99%   BMI 16.22 kg/m²     Intake/Output Summary (Last 24 hours) at 12/1/2024 1231  Last data filed at 12/1/2024 0900  Gross per 24 hour   Intake 945 ml   Output --   Net 945 ml      Wt Readings from Last 3 Encounters:   11/24/24 49.1 kg (108 lb 3.9 oz)   11/22/24 53.5 kg (118 lb)   11/13/24 59.9 kg (132 lb)       Physical Examination:   Vitals:    12/01/24 0900   BP: 128/63   Pulse: 67   Resp: 16   Temp: 97.5 °F (36.4 °C)   SpO2: 99%       General appearance:  No apparent distress, appears stated age and cooperative.  Cardiovascular: Regular rate and rhythm, normal S1, S2. No murmur.   Respiratory:Clear to auscultation bilaterally, no wheeze or crackles.   GI: Abdomen  not distended, Dressing in place with wound vac   Musculoskeletal: No cyanosis in digits.  No BLE edema present  Neurology: CN 2-12 grossly intact. No speech or motor deficits  No change in physical exam from 11/28    Labs and 
                     [facility] Note    Patient Active Problem List   Diagnosis    Hypertension    Arthritis    Diverticulitis    Primary localized osteoarthrosis, pelvic region and thigh    Labyrinthine vertigo    SBO (small bowel obstruction) (HCC)    Severe malnutrition (HCC)    Open abdominal wall wound, initial encounter    Postoperative surgical complication involving skin associated with non-dermatologic procedure    Enterocutaneous fistula       Past Medical History:   has a past medical history of Arthritis, Diverticulitis, Hyperlipidemia, Hypertension, and S/P colon resection.    Past Social History:   reports that he quit smoking about 13 years ago. His smoking use included cigarettes. He has never used smokeless tobacco. He reports that he does not drink alcohol and does not use drugs.    Subjective:    Continues Tolerating some PO intake, still poor. He is still trying to drink more.  No new neurologic symptoms    Review of Systems   Constitutional:  Positive for fatigue. Negative for activity change, appetite change, chills, fever and unexpected weight change.   HENT:  Negative for congestion and facial swelling.    Eyes:  Negative for photophobia, discharge and redness.   Respiratory:  Negative for cough, chest tightness and shortness of breath.    Cardiovascular:  Negative for chest pain, palpitations and leg swelling.   Gastrointestinal:  Negative for abdominal distention, abdominal pain, blood in stool, constipation, diarrhea, nausea and vomiting.   Endocrine: Negative for cold intolerance, heat intolerance and polyuria.   Genitourinary:  Negative for decreased urine volume, difficulty urinating, flank pain and hematuria.   Musculoskeletal:  Negative for joint swelling and neck pain.   Neurological:  Negative for dizziness, seizures, syncope, speech difficulty, light-headedness and headaches.   Hematological:  Does not bruise/bleed easily.   Psychiatric/Behavioral:  Negative for 
                     [facility] Note    Patient Active Problem List   Diagnosis    Hypertension    Arthritis    Diverticulitis    Primary localized osteoarthrosis, pelvic region and thigh    Labyrinthine vertigo    SBO (small bowel obstruction) (HCC)    Moderate malnutrition (HCC)    Open abdominal wall wound, initial encounter    Postoperative surgical complication involving skin associated with non-dermatologic procedure       Past Medical History:   has a past medical history of Arthritis, Diverticulitis, Hyperlipidemia, Hypertension, and S/P colon resection.    Past Social History:   reports that he quit smoking about 13 years ago. His smoking use included cigarettes. He has never used smokeless tobacco. He reports that he does not drink alcohol and does not use drugs.    Subjective:    Has NGT to suction still today.  No new neurologic symptoms    Review of Systems   Constitutional:  Positive for fatigue. Negative for activity change, appetite change, chills, fever and unexpected weight change.   HENT:  Negative for congestion and facial swelling.    Eyes:  Negative for photophobia, discharge and redness.   Respiratory:  Negative for cough, chest tightness and shortness of breath.    Cardiovascular:  Negative for chest pain, palpitations and leg swelling.   Gastrointestinal:  Negative for abdominal distention, abdominal pain, blood in stool, constipation, diarrhea, nausea and vomiting.   Endocrine: Negative for cold intolerance, heat intolerance and polyuria.   Genitourinary:  Negative for decreased urine volume, difficulty urinating, flank pain and hematuria.   Musculoskeletal:  Negative for joint swelling and neck pain.   Neurological:  Negative for dizziness, seizures, syncope, speech difficulty, light-headedness and headaches.   Hematological:  Does not bruise/bleed easily.   Psychiatric/Behavioral:  Negative for agitation, confusion and hallucinations.        Objective:    Good UOP  /74   
                     [facility] Note    Patient Active Problem List   Diagnosis    Hypertension    Arthritis    Diverticulitis    Primary localized osteoarthrosis, pelvic region and thigh    Labyrinthine vertigo    SBO (small bowel obstruction) (HCC)    Severe malnutrition (HCC)    Open abdominal wall wound, initial encounter    Postoperative surgical complication involving skin associated with non-dermatologic procedure    Enterocutaneous fistula       Past Medical History:   has a past medical history of Arthritis, Diverticulitis, Hyperlipidemia, Hypertension, and S/P colon resection.    Past Social History:   reports that he quit smoking about 13 years ago. His smoking use included cigarettes. He has never used smokeless tobacco. He reports that he does not drink alcohol and does not use drugs.    Subjective:    Still with minimal PO intake but daughter states because he doesn't like the taste. \"Food tastes bland.\" Not due to n/v. Inquiring about other options is available.  No new neurologic symptoms    Review of Systems   Constitutional:  Positive for fatigue. Negative for activity change, appetite change, chills, fever and unexpected weight change.   HENT:  Negative for congestion and facial swelling.    Eyes:  Negative for photophobia, discharge and redness.   Respiratory:  Negative for cough, chest tightness and shortness of breath.    Cardiovascular:  Negative for chest pain, palpitations and leg swelling.   Gastrointestinal:  Negative for abdominal distention, abdominal pain, blood in stool, constipation, diarrhea, nausea and vomiting.   Endocrine: Negative for cold intolerance, heat intolerance and polyuria.   Genitourinary:  Negative for decreased urine volume, difficulty urinating, flank pain and hematuria.   Musculoskeletal:  Negative for joint swelling and neck pain.   Neurological:  Negative for dizziness, seizures, syncope, speech difficulty, light-headedness and headaches. 
                     [facility] Note    Patient Active Problem List   Diagnosis    Hypertension    Arthritis    Diverticulitis    Primary localized osteoarthrosis, pelvic region and thigh    Labyrinthine vertigo    SBO (small bowel obstruction) (HCC)    Severe malnutrition (HCC)    Open abdominal wall wound, initial encounter    Postoperative surgical complication involving skin associated with non-dermatologic procedure    Enterocutaneous fistula       Past Medical History:   has a past medical history of Arthritis, Diverticulitis, Hyperlipidemia, Hypertension, and S/P colon resection.    Past Social History:   reports that he quit smoking about 13 years ago. His smoking use included cigarettes. He has never used smokeless tobacco. He reports that he does not drink alcohol and does not use drugs.    Subjective:    Having some dysphagia still, poor po intake. No other complaints at this time.    Review of Systems   Constitutional:  Positive for fatigue. Negative for activity change, appetite change, chills, fever and unexpected weight change.   HENT:  Negative for congestion and facial swelling.    Eyes:  Negative for photophobia, discharge and redness.   Respiratory:  Negative for cough, chest tightness and shortness of breath.    Cardiovascular:  Negative for chest pain, palpitations and leg swelling.   Gastrointestinal:  Negative for abdominal distention, abdominal pain, blood in stool, constipation, diarrhea, nausea and vomiting.   Endocrine: Negative for cold intolerance, heat intolerance and polyuria.   Genitourinary:  Negative for decreased urine volume, difficulty urinating, flank pain and hematuria.   Musculoskeletal:  Negative for joint swelling and neck pain.   Neurological:  Negative for dizziness, seizures, syncope, speech difficulty, light-headedness and headaches.   Hematological:  Does not bruise/bleed easily.   Psychiatric/Behavioral:  Negative for agitation, confusion and hallucinations.  
                     [facility] Note    Patient Active Problem List   Diagnosis    Hypertension    Arthritis    Diverticulitis    Primary localized osteoarthrosis, pelvic region and thigh    Labyrinthine vertigo    SBO (small bowel obstruction) (HCC)    Moderate malnutrition (HCC)    Open abdominal wall wound, initial encounter    Postoperative surgical complication involving skin associated with non-dermatologic procedure    Enterocutaneous fistula       Past Medical History:   has a past medical history of Arthritis, Diverticulitis, Hyperlipidemia, Hypertension, and S/P colon resection.    Past Social History:   reports that he quit smoking about 13 years ago. His smoking use included cigarettes. He has never used smokeless tobacco. He reports that he does not drink alcohol and does not use drugs.    Subjective:    Tolerating some PO intake, still poor. He is trying to drink more.  No new neurologic symptoms    Review of Systems   Constitutional:  Positive for fatigue. Negative for activity change, appetite change, chills, fever and unexpected weight change.   HENT:  Negative for congestion and facial swelling.    Eyes:  Negative for photophobia, discharge and redness.   Respiratory:  Negative for cough, chest tightness and shortness of breath.    Cardiovascular:  Negative for chest pain, palpitations and leg swelling.   Gastrointestinal:  Negative for abdominal distention, abdominal pain, blood in stool, constipation, diarrhea, nausea and vomiting.   Endocrine: Negative for cold intolerance, heat intolerance and polyuria.   Genitourinary:  Negative for decreased urine volume, difficulty urinating, flank pain and hematuria.   Musculoskeletal:  Negative for joint swelling and neck pain.   Neurological:  Negative for dizziness, seizures, syncope, speech difficulty, light-headedness and headaches.   Hematological:  Does not bruise/bleed easily.   Psychiatric/Behavioral:  Negative for agitation, confusion 
                     [facility] Note    Patient Active Problem List   Diagnosis    Hypertension    Arthritis    Diverticulitis    Primary localized osteoarthrosis, pelvic region and thigh    Labyrinthine vertigo    SBO (small bowel obstruction) (HCC)    Moderate malnutrition (HCC)    Open abdominal wall wound, initial encounter    Postoperative surgical complication involving skin associated with non-dermatologic procedure       Past Medical History:   has a past medical history of Arthritis, Diverticulitis, Hyperlipidemia, Hypertension, and S/P colon resection.    Past Social History:   reports that he quit smoking about 13 years ago. His smoking use included cigarettes. He has never used smokeless tobacco. He reports that he does not drink alcohol and does not use drugs.    Subjective:    Has NGT to suction, +residual  No new neurologic symptoms    Review of Systems   Constitutional:  Positive for fatigue. Negative for activity change, appetite change, chills, fever and unexpected weight change.   HENT:  Negative for congestion and facial swelling.    Eyes:  Negative for photophobia, discharge and redness.   Respiratory:  Negative for cough, chest tightness and shortness of breath.    Cardiovascular:  Negative for chest pain, palpitations and leg swelling.   Gastrointestinal:  Negative for abdominal distention, abdominal pain, blood in stool, constipation, diarrhea, nausea and vomiting.   Endocrine: Negative for cold intolerance, heat intolerance and polyuria.   Genitourinary:  Negative for decreased urine volume, difficulty urinating, flank pain and hematuria.   Musculoskeletal:  Negative for joint swelling and neck pain.   Neurological:  Negative for dizziness, seizures, syncope, speech difficulty, light-headedness and headaches.   Hematological:  Does not bruise/bleed easily.   Psychiatric/Behavioral:  Negative for agitation, confusion and hallucinations.        Objective:    1.4L urine output past 
                     [facility] Note    Patient Active Problem List   Diagnosis    Hypertension    Arthritis    Diverticulitis    Primary localized osteoarthrosis, pelvic region and thigh    Labyrinthine vertigo    SBO (small bowel obstruction) (HCC)    Moderate malnutrition (HCC)    Open abdominal wall wound, initial encounter    Postoperative surgical complication involving skin associated with non-dermatologic procedure       Past Medical History:   has a past medical history of Arthritis, Diverticulitis, Hyperlipidemia, Hypertension, and S/P colon resection.    Past Social History:   reports that he quit smoking about 13 years ago. His smoking use included cigarettes. He has never used smokeless tobacco. He reports that he does not drink alcohol and does not use drugs.    Subjective:    Has NGT to suction  No new neurologic symptoms    Review of Systems   Constitutional:  Positive for fatigue. Negative for activity change, appetite change, chills, fever and unexpected weight change.   HENT:  Negative for congestion and facial swelling.    Eyes:  Negative for photophobia, discharge and redness.   Respiratory:  Negative for cough, chest tightness and shortness of breath.    Cardiovascular:  Negative for chest pain, palpitations and leg swelling.   Gastrointestinal:  Negative for abdominal distention, abdominal pain, blood in stool, constipation, diarrhea, nausea and vomiting.   Endocrine: Negative for cold intolerance, heat intolerance and polyuria.   Genitourinary:  Negative for decreased urine volume, difficulty urinating, flank pain and hematuria.   Musculoskeletal:  Negative for joint swelling and neck pain.   Neurological:  Negative for dizziness, seizures, syncope, speech difficulty, light-headedness and headaches.   Hematological:  Does not bruise/bleed easily.   Psychiatric/Behavioral:  Negative for agitation, confusion and hallucinations.        Objective:      /72   Pulse 94   Temp 98.1 
                     [facility] Note    Patient Active Problem List   Diagnosis    Hypertension    Arthritis    Diverticulitis    Primary localized osteoarthrosis, pelvic region and thigh    Labyrinthine vertigo    SBO (small bowel obstruction) (HCC)    Moderate malnutrition (HCC)    Open abdominal wall wound, initial encounter    Postoperative surgical complication involving skin associated with non-dermatologic procedure    Enterocutaneous fistula       Past Medical History:   has a past medical history of Arthritis, Diverticulitis, Hyperlipidemia, Hypertension, and S/P colon resection.    Past Social History:   reports that he quit smoking about 13 years ago. His smoking use included cigarettes. He has never used smokeless tobacco. He reports that he does not drink alcohol and does not use drugs.    Subjective:    Tolerating some PO intake, still poor. He is trying to drink more.  No new neurologic symptoms    Review of Systems   Constitutional:  Positive for fatigue. Negative for activity change, appetite change, chills, fever and unexpected weight change.   HENT:  Negative for congestion and facial swelling.    Eyes:  Negative for photophobia, discharge and redness.   Respiratory:  Negative for cough, chest tightness and shortness of breath.    Cardiovascular:  Negative for chest pain, palpitations and leg swelling.   Gastrointestinal:  Negative for abdominal distention, abdominal pain, blood in stool, constipation, diarrhea, nausea and vomiting.   Endocrine: Negative for cold intolerance, heat intolerance and polyuria.   Genitourinary:  Negative for decreased urine volume, difficulty urinating, flank pain and hematuria.   Musculoskeletal:  Negative for joint swelling and neck pain.   Neurological:  Negative for dizziness, seizures, syncope, speech difficulty, light-headedness and headaches.   Hematological:  Does not bruise/bleed easily.   Psychiatric/Behavioral:  Negative for agitation, confusion 
               Gastroenterology Progress Note            Kevin Blair is a 89 y.o. male patient.  1. Postoperative surgical complication involving skin associated with non-dermatologic procedure, unspecified complication    2. Inadequate oral intake    3. Goals of care, counseling/discussion    4. Small bowel obstruction (HCC)        SUBJECTIVE:  Feels ok.  Tolerating dysphagia diet ordered by SLP, and drinking Ensure x 3 bottles today.    Physical    VITALS:  BP (!) 105/58   Pulse 85   Temp 97.8 °F (36.6 °C) (Oral)   Resp 16   Ht 1.74 m (5' 8.5\")   Wt 52.7 kg (116 lb 2.9 oz)   SpO2 97%   BMI 17.41 kg/m²   TEMPERATURE:  Current - Temp: 97.8 °F (36.6 °C); Max - Temp  Av °F (36.7 °C)  Min: 97.3 °F (36.3 °C)  Max: 98.6 °F (37 °C)    Abdomen soft, midline incision, ND, NT, no HSM, Bowel sounds normal     Data      Recent Labs     24  0839   WBC 6.1   HGB 8.0*   HCT 24.3*   MCV 95.7        Recent Labs     24  0500 24  0500 24  1340 24  0839   * 140 142 140   K 3.4* 3.4*  --  4.1   * 106  --  107   CO2 31 31  --  29   PHOS 2.8 2.5  --  2.4*   BUN 25* 21*  --  23*   CREATININE 0.9 0.9  --  0.9     No results for input(s): \"AST\", \"ALT\", \"BILIDIR\", \"BILITOT\", \"ALKPHOS\" in the last 72 hours.    Invalid input(s): \"ALB\"  No results for input(s): \"LIPASE\", \"AMYLASE\" in the last 72 hours.          ASSESSMENT :      Severe malnutrition (HCC)    Postoperative surgical complication involving skin associated with non-dermatologic procedure    Enterocutaneous fistula       Failure to thrive and severe anorexia after bowel surgeries.  General surgery requested for PEG placement for enteral nutrition.  Patient is currently on TPN.    He is actually tolerating a dysphagia diet this a.m.  He states he has taken 3 bottles of Ensure p.o. already today.  I discussed the procedure of PEG placement, risks, benefits, and possible complications including perforation of other 
        [facility]    12/5/2024 7:44 AM        Name: Kevin Blair .              Admitted: 11/22/2024  Primary Care Provider: Blane Callahan MD (Tel: [phone])      Brief Course: This 89-year-old male with PMHx of hypertension, hyperlipidemia, s/p ex lap & small bowel resection for volvulus and ischemic bowel on 11/7/24 admitted for enterocolic fistula; s/p lysis of adhesion and small bowel resection with anastomosis.  Currently on TPN. Also has a wound vac. Had complex chandler placed in OR for false passage. Now voiding qs            Interval history:   Pt seen this am briefly before he had MBS and then seen again around noon time prior to his CT scan.    We had detailed conversation regarding the plan of care etc.  He does admit to feeling depressed.  States he has no interest in eating.  He declined marinol       Has chronic dysphagia,  declined PEG 2 days ago.       Reviewed interval ancillary notes    Current Medications  potassium bicarbonate (EFFER-K/K-LYTE) disintegrating tablet 50 mEq, Once  PN-Adult Premix 5/20 - Standard Electrolytes - Central Line, Continuous TPN  metoclopramide (REGLAN) injection 5 mg, Q6H  0.9 % sodium chloride infusion, PRN  insulin lispro (HUMALOG,ADMELOG) injection vial 0-8 Units, 4x Daily AC & HS  pantoprazole (PROTONIX) injection 40 mg, Daily  fat emulsion (INTRALIPID/NUTRILIPID) 20 % infusion 250 mL, Once per day on Monday Thursday  thiamine (B-1) injection 100 mg, Daily  phenol 1.4 % mouth spray 1 spray, Q2H PRN  enoxaparin (LOVENOX) injection 40 mg, Daily  HYDROmorphone (DILAUDID) injection 0.25 mg, Q3H PRN   Or  HYDROmorphone (DILAUDID) injection 0.5 mg, Q3H PRN  dextrose bolus 10% 125 mL, PRN   Or  dextrose bolus 10% 250 mL, PRN  glucagon injection 1 mg, PRN  dextrose 10 % infusion, Continuous PRN  sodium chloride flush 0.9 % injection 5-40 mL, 2 times per day  sodium chloride flush 0.9 % injection 5-40 mL, PRN  sodium chloride flush 0.9 
        [facility]    12/7/2024 7:39 AM        Name: Kevin Blair .              Admitted: 11/22/2024  Primary Care Provider: Blane Callahan MD (Tel: [phone])      Brief Course: This 89-year-old male with PMHx of hypertension, hyperlipidemia, s/p ex lap & small bowel resection for volvulus and ischemic bowel on 11/7/24 admitted for enterocolic fistula; s/p lysis of adhesion and small bowel resection with anastomosis.  Currently on TPN. Also has a wound vac. Had complex chandler placed in OR for false passage. Now voiding qs            Seen this am with Kale and 2 daughters at the bedside  I assisted him to the chair.  The bed makes his butt hurt  Today he had 2 ensures for breakfast.  Seems to be very motivated today to eat       Has chronic dysphagia      Reviewed interval ancillary notes    Current Medications  ALTEplase (CATHFLO) injection 2 mg, PRN  PN-Adult Premix 5/20 - Standard Electrolytes - Central Line, Continuous TPN  diatrizoate meglumine-sodium (GASTROGRAFIN) 66-10 % solution 20 mL, ONCE PRN  0.9 % sodium chloride infusion, PRN  insulin lispro (HUMALOG,ADMELOG) injection vial 0-8 Units, 4x Daily AC & HS  pantoprazole (PROTONIX) injection 40 mg, Daily  fat emulsion (INTRALIPID/NUTRILIPID) 20 % infusion 250 mL, Once per day on Monday Thursday  thiamine (B-1) injection 100 mg, Daily  phenol 1.4 % mouth spray 1 spray, Q2H PRN  enoxaparin (LOVENOX) injection 40 mg, Daily  HYDROmorphone (DILAUDID) injection 0.25 mg, Q3H PRN   Or  HYDROmorphone (DILAUDID) injection 0.5 mg, Q3H PRN  dextrose bolus 10% 125 mL, PRN   Or  dextrose bolus 10% 250 mL, PRN  glucagon injection 1 mg, PRN  dextrose 10 % infusion, Continuous PRN  sodium chloride flush 0.9 % injection 5-40 mL, 2 times per day  sodium chloride flush 0.9 % injection 5-40 mL, PRN  sodium chloride flush 0.9 % injection 5-40 mL, 2 times per day  sodium chloride flush 0.9 % injection 5-40 mL, PRN  0.9 % sodium chloride 
        [facility]    12/6/2024 7:25 AM        Name: Kevin Blair .              Admitted: 11/22/2024  Primary Care Provider: Blane Callahan MD (Tel: [phone])      Brief Course: This 89-year-old male with PMHx of hypertension, hyperlipidemia, s/p ex lap & small bowel resection for volvulus and ischemic bowel on 11/7/24 admitted for enterocolic fistula; s/p lysis of adhesion and small bowel resection with anastomosis.  Currently on TPN. Also has a wound vac. Had complex chandler placed in OR for false passage. Now voiding qs            Seen this am with friend at bedside  Pt was up in the chair.  Labs/ CT results etc reviewed.  He is not sleeping well. Reports the bed is uncomfortable.  We discussed possibly adding remeron.  Pt is willing not take any medications unless prescribed by his psychiatrist.    Pt reports he has no desire to eat.  States it takes too much effort.  He is now interested in feeding tube     Has chronic dysphagia      Reviewed interval ancillary notes    Current Medications  PN-Adult Premix 5/20 - Standard Electrolytes - Central Line, Continuous TPN  diatrizoate meglumine-sodium (GASTROGRAFIN) 66-10 % solution 20 mL, ONCE PRN  metoclopramide (REGLAN) injection 5 mg, Q6H  0.9 % sodium chloride infusion, PRN  insulin lispro (HUMALOG,ADMELOG) injection vial 0-8 Units, 4x Daily AC & HS  pantoprazole (PROTONIX) injection 40 mg, Daily  fat emulsion (INTRALIPID/NUTRILIPID) 20 % infusion 250 mL, Once per day on Monday Thursday  thiamine (B-1) injection 100 mg, Daily  phenol 1.4 % mouth spray 1 spray, Q2H PRN  enoxaparin (LOVENOX) injection 40 mg, Daily  HYDROmorphone (DILAUDID) injection 0.25 mg, Q3H PRN   Or  HYDROmorphone (DILAUDID) injection 0.5 mg, Q3H PRN  dextrose bolus 10% 125 mL, PRN   Or  dextrose bolus 10% 250 mL, PRN  glucagon injection 1 mg, PRN  dextrose 10 % infusion, Continuous PRN  sodium chloride flush 0.9 % injection 5-40 mL, 2 times per 
        [facility]        Progress Note    Patient Name: Kevin Blair  MRN: [id number]  YOB: 1935  Date of Evaluation: 12/10/2024    Subjective:  No acute events overnight  Denies pain  No nausea or vomiting  Passing flatus and stool  Resting in bed at this time    Post-Operative Day #15      Vital Signs:  Patient Vitals for the past 24 hrs:   BP Temp Temp src Pulse Resp SpO2 Height   12/10/24 1128 -- -- -- -- -- -- 1.74 m (5' 8.5\")   12/10/24 1045 (!) 152/77 -- -- 71 16 97 % --   12/10/24 1019 (!) 155/77 97.5 °F (36.4 °C) Oral 74 16 96 % --   12/10/24 1000 135/69 -- -- 69 12 98 % --   12/10/24 0945 115/63 98 °F (36.7 °C) Temporal 63 12 100 % --   12/10/24 0940 106/60 -- -- 64 13 100 % --   12/10/24 0935 110/66 -- -- 62 12 100 % --   12/10/24 0930 107/63 97.2 °F (36.2 °C) Temporal 67 14 100 % --   12/10/24 0823 138/77 97.6 °F (36.4 °C) Temporal 77 18 98 % --   12/10/24 0500 132/72 98.3 °F (36.8 °C) Oral 73 16 95 % --   12/10/24 0020 130/75 98.1 °F (36.7 °C) Oral 74 16 95 % --   24 1948 130/71 98 °F (36.7 °C) Oral 75 16 98 % --   24 1552 (!) 142/73 97.6 °F (36.4 °C) Oral 72 18 98 % --   24 1318 (!) 147/72 97.3 °F (36.3 °C) Oral 80 16 97 % --      TEMPERATURE HISTORY 24H: Temp (24hrs), Av.7 °F (36.5 °C), Min:97.2 °F (36.2 °C), Max:98.3 °F (36.8 °C)    BLOOD PRESSURE HISTORY: Systolic (36hrs), Av , Min:106 , Max:155    Diastolic (36hrs), Av, Min:56, Max:77      Intake/Output:  I/O last 3 completed shifts:  In: 1328.7 [P.O.:840; Blood:488.7]  Out: 4700 [Urine:4700]  I/O this shift:  In: 240 [P.O.:240]  Out: 400 [Urine:400]  Drain/tube Output:       Physical Exam:  General: awake, alert, oriented to person, place, time  Lungs: unlabored respirations  Abdomen: soft, non-distended, no significant tenderness noted, bowel sounds present  Skin/Wound: wound bed not assessed at this time--dressing in place; retention sutures remain in 
        [facility]    12/8/2024 7:45 AM        Name: Kevin Blair .              Admitted: 11/22/2024  Primary Care Provider: Blnae Callahan MD (Tel: [phone])      Brief Course: This 89-year-old male with PMHx of hypertension, hyperlipidemia, s/p ex lap & small bowel resection for volvulus and ischemic bowel on 11/7/24 admitted for enterocolic fistula; s/p lysis of adhesion and small bowel resection with anastomosis.  Currently on TPN. Also has a wound vac. Had complex chandler placed in OR for false passage. Now voiding qs          HGB 7.2 this am   I assisted his to the bathroom to have BM    Was iron deficient in late November, will give venofer  Appetite is improving   Pain control adequate       Has chronic dysphagia      Reviewed interval ancillary notes    Current Medications  ALTEplase (CATHFLO) injection 2 mg, PRN  PN-Adult Premix 5/20 - Standard Electrolytes - Central Line, Continuous TPN  diatrizoate meglumine-sodium (GASTROGRAFIN) 66-10 % solution 20 mL, ONCE PRN  0.9 % sodium chloride infusion, PRN  insulin lispro (HUMALOG,ADMELOG) injection vial 0-8 Units, 4x Daily AC & HS  pantoprazole (PROTONIX) injection 40 mg, Daily  fat emulsion (INTRALIPID/NUTRILIPID) 20 % infusion 250 mL, Once per day on Monday Thursday  thiamine (B-1) injection 100 mg, Daily  phenol 1.4 % mouth spray 1 spray, Q2H PRN  enoxaparin (LOVENOX) injection 40 mg, Daily  HYDROmorphone (DILAUDID) injection 0.25 mg, Q3H PRN   Or  HYDROmorphone (DILAUDID) injection 0.5 mg, Q3H PRN  dextrose bolus 10% 125 mL, PRN   Or  dextrose bolus 10% 250 mL, PRN  glucagon injection 1 mg, PRN  dextrose 10 % infusion, Continuous PRN  sodium chloride flush 0.9 % injection 5-40 mL, 2 times per day  sodium chloride flush 0.9 % injection 5-40 mL, PRN  sodium chloride flush 0.9 % injection 5-40 mL, 2 times per day  sodium chloride flush 0.9 % injection 5-40 mL, PRN  0.9 % sodium chloride infusion, PRN  lisinopril 
        [facility]        Progress Note    Patient Name: Kevin Blair  MRN: [id number]  YOB: 1935  Date of Evaluation: 2024    Subjective:  No acute events overnight  Pain controlled without narcotics  No nausea or vomiting, appetite remains poor--reports drinking just one Ensure for breakfast  Passing flatus and stool  Resting in bed at this time, feels depressed    Post-Operative Day #9      Vital Signs:  Patient Vitals for the past 24 hrs:   BP Temp Temp src Pulse Resp SpO2   24 0855 136/70 98.4 °F (36.9 °C) Oral 77 16 97 %   24 0415 126/66 97.3 °F (36.3 °C) Oral 77 16 97 %   24 0020 121/66 97.9 °F (36.6 °C) Oral 77 16 94 %   24 2101 119/63 98.3 °F (36.8 °C) Oral 83 14 97 %   24 1626 119/66 98.2 °F (36.8 °C) Oral 84 16 95 %      TEMPERATURE HISTORY 24H: Temp (24hrs), Av °F (36.7 °C), Min:97.3 °F (36.3 °C), Max:98.4 °F (36.9 °C)    BLOOD PRESSURE HISTORY: Systolic (36hrs), Av , Min:119 , Max:138    Diastolic (36hrs), Av, Min:63, Max:70      Intake/Output:  I/O last 3 completed shifts:  In: .8   Out: 2800 [Urine:2800]  No intake/output data recorded.  Drain/tube Output:       Physical Exam:  General: awake, alert, oriented to person, place, time  Lungs: unlabored respirations  Abdomen: soft, non-distended, incisional tenderness only, bowel sounds present  Skin/Wound: wound bed is clean, only scant fibrinous debris--decreasing, fascia is intact--wound VAC removed, NS wet-to-dry dressing place; retention sutures remain in place    Labs:  CBC:    Recent Labs     24  0450 24  1225 24  0835 24  1125   WBC 5.8  --  8.0 7.0   HGB 6.9* 8.2* 8.3* 7.9*   HCT 20.6* 25.3* 25.6* 24.0*     --  218 205     BMP:    Recent Labs     24  0450 24  0625 24  1125   * 147* 151*   K 3.5 3.4* 3.4*   * 111* 114*   CO2 30 31 32   BUN 20 19 24*   CREATININE 0.9 0.9 0.9   GLUCOSE 231* 
        [facility]        Progress Note    Patient Name: Kevin Blair  MRN: [id number]  YOB: 1935  Date of Evaluation: 2024    Subjective:  No acute events overnight  Denies pain  No nausea or vomiting, appetite has improved over the weekend  Passing flatus and stool, reports melena  Resting in bed at this time    Post-Operative Day #14      Vital Signs:  Patient Vitals for the past 24 hrs:   BP Temp Temp src Pulse Resp SpO2   24 1318 (!) 147/72 97.3 °F (36.3 °C) Oral 80 16 97 %   24 1015 137/73 97.4 °F (36.3 °C) -- 82 18 98 %   24 1004 137/73 97.4 °F (36.3 °C) Axillary 82 18 98 %   24 0736 135/70 97.9 °F (36.6 °C) Oral 71 16 98 %   24 0724 136/65 97.9 °F (36.6 °C) Oral 73 16 98 %   24 0715 135/70 97.9 °F (36.6 °C) -- 71 16 98 %   24 0700 (!) 135/56 97.8 °F (36.6 °C) Oral 74 16 97 %   24 0653 130/68 97.7 °F (36.5 °C) Oral 71 16 97 %   24 0645 133/70 97.8 °F (36.6 °C) Oral 80 18 96 %   24 0437 123/67 98 °F (36.7 °C) Oral 79 18 95 %   24 2353 123/61 97.9 °F (36.6 °C) Oral 81 16 95 %   24 2055 138/70 97.9 °F (36.6 °C) Oral 85 16 98 %   24 1607 124/65 97.8 °F (36.6 °C) Oral 89 16 98 %      TEMPERATURE HISTORY 24H: Temp (24hrs), Av.7 °F (36.5 °C), Min:97.3 °F (36.3 °C), Max:98 °F (36.7 °C)    BLOOD PRESSURE HISTORY: Systolic (36hrs), Av , Min:109 , Max:147    Diastolic (36hrs), Av, Min:56, Max:84      Intake/Output:  I/O last 3 completed shifts:  In: 600 [P.O.:600]  Out: 3900 [Urine:3900]  I/O this shift:  In: 1088.7 [P.O.:600; Blood:488.7]  Out: 900 [Urine:900]  Drain/tube Output:       Physical Exam:  General: awake, alert, oriented to person, place, time  Lungs: unlabored respirations  Abdomen: soft, non-distended, no significant tenderness noted, bowel sounds present  Skin/Wound: wound bed is clean, only scant fibrinous debris along base--decreasing, fascia is 
  [facility] - Inpatient Rehabilitation Department   Phone: [phone]     Physical Therapy     [] Initial Evaluation                          [x] Daily Treatment Note                      [] Discharge Summary        Patient: Kevin Blair                           : 1935                                  MRN: [id number]                                    Date of Service:  12/3/2024  Admitting Diagnosis: Open abdominal wall wound, initial encounter  Current Admission Summary: Kevin Blair is a 89 y.o. male who presents with abdominal drainage. 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 Pt had intra-op perforation and contaminated field. Fascia closed, skin left open. Pt had 1 week recovery inpt, discharged . Seen in office yesterday with Dr. Sheridan due to wound drainage, site evaluated, dehiscence noted, old sutures removed. VAC planned, however drainage became much worse and pt came to ER. Pt admitted. Mild pain, no fever, no emesis, is passing gas. Has green fluid in the wound. S/p ex lap and small bowel resection on . Placement of wound vac    Past Medical History:  has a past medical history of Arthritis, Diverticulitis, Hyperlipidemia, Hypertension, and S/P colon resection.  Past Surgical History:  has a past surgical history that includes TURP; colectomy; Tonsillectomy; Cataract removal; shoulder surgery; Knee arthroscopy; Carpal tunnel release; Total hip arthroplasty (2012); Small intestine surgery (N/A, 2024); laparotomy (N/A, 2024); and Small intestine surgery (N/A, 2024).  Discharge Recommendations: Kevin Blair scored a 18/24 on the AM-PAC short mobility form. Current research shows that an AM-PAC score of 18 or greater is typically associated with a discharge to the patient's home setting. Based on the patient's AM-PAC score and their current functional mobility deficits, it is 
  [facility] - Inpatient Rehabilitation Department   Phone: [phone]    Occupational Therapy    [] Initial Evaluation            [x] Daily Treatment Note         [] Discharge Summary      Patient: Kevin Blair   : 1935   MRN: [id number]   Date of Service:  2024    Admitting Diagnosis:  Open abdominal wall wound, initial encounter  Current Admission Summary: Kevin Blair is a 89 y.o. male who presents with Open abdominal wall wound, initial encounter     : Pt s/p status post lysis of adhesions and small bowel resection with anastomosis.     Past Medical History:  has a past medical history of Arthritis, Diverticulitis, Hyperlipidemia, Hypertension, and S/P colon resection.  Past Surgical History:  has a past surgical history that includes TURP; colectomy; Tonsillectomy; Cataract removal; shoulder surgery; Knee arthroscopy; Carpal tunnel release; Total hip arthroplasty (2012); Small intestine surgery (N/A, 2024); laparotomy (N/A, 2024); and Small intestine surgery (N/A, 2024).    Discharge Recommendations: Kevin Blair scored a 19/24 on the AM-PAC ADL Inpatient form. Current research shows that an AM-PAC score of 18 or greater is typically associated with a discharge to the patient's home setting. Based on the patient's AM-PAC score, and their current ADL deficits, it is recommended that the patient have 2-3 sessions per week of Occupational Therapy at d/c to increase the patient's independence.  At this time, this patient demonstrates the endurance and safety to discharge home with HHOT (home vs OP services) and a follow up treatment frequency of 2-3x/wk.   Please see assessment section for further patient specific details.    If patient discharges prior to next session this note will serve as a discharge summary.  Please see below for the latest assessment towards goals.      DME Required For Discharge: shower chair with 
  Clinical Pharmacy Note     Pharmacy consulted by Dr. Sheridan to manage TPN     Current TPN rate: 75ml/hr  Goal TPN rate: 75ml/hr     Access: PICC  Indication: Malnutrition    Labs:  General Labs:  CMP:    Lab Results   Component Value Date/Time     11/30/2024 06:06 AM    K 3.4 11/30/2024 06:06 AM    K 3.4 11/26/2024 04:09 AM     11/30/2024 06:06 AM    CO2 34 11/30/2024 06:06 AM    BUN 21 11/30/2024 06:06 AM    CREATININE 1.1 11/30/2024 06:06 AM    GFRAA >60 04/22/2022 11:25 AM    GFRAA 47 03/16/2013 03:15 AM    AGRATIO 0.9 11/23/2024 04:59 AM    LABGLOM 65 11/30/2024 06:06 AM    LABGLOM >60 05/16/2023 11:19 AM    GLUCOSE 182 11/30/2024 06:06 AM    CALCIUM 10.6 11/30/2024 06:06 AM    BILITOT 0.4 11/23/2024 04:59 AM    ALKPHOS 74 11/23/2024 04:59 AM    AST 18 11/23/2024 04:59 AM    ALT 13 11/23/2024 04:59 AM     Magnesium:    Lab Results   Component Value Date/Time    MG 2.57 11/30/2024 06:06 AM     Phosphorus:    Lab Results   Component Value Date/Time    PHOS 2.6 11/30/2024 06:06 AM       Electrolyte replacement as follows:   Replace potassium with 60 mEq of potassium chloride administered IV over 6 hours  Na 151, RN had hung bag of normal saline, MD stopped bag and ordered D5w @100 and increased free water flushes    Blood sugars over past 24 hours: 171-278    Blood sugar management:  Plan to Continue Humalog q4 hour sliding scale at low dose as pt has only received 1 unit in past 24 hours.    Plan to continue TPN at 75 ml/hr.    Thank you for allowing pharmacy to participate in the care of this patient.    Elana Saenz, PharmD  PGY-1 Pharmacy Resident      
  Clinical Pharmacy Note    Pharmacy consulted by Dr. Sheridan to manage TPN    Current TPN rate: 40ml/hr  Goal TPN rate: 75ml/hr    Access: PICC  Indication: Malnutrition    Labs:  General Labs:  BMP:    Lab Results   Component Value Date/Time     11/26/2024 04:09 AM    K 3.4 11/26/2024 04:09 AM     11/26/2024 04:09 AM    CO2 29 11/26/2024 04:09 AM    BUN 17 11/26/2024 04:09 AM    CREATININE 1.0 11/26/2024 04:09 AM    CALCIUM 9.7 11/26/2024 04:09 AM    GFRAA >60 04/22/2022 11:25 AM    GFRAA 47 03/16/2013 03:15 AM    LABGLOM 72 11/26/2024 04:09 AM    LABGLOM >60 05/16/2023 11:19 AM    GLUCOSE 177 11/26/2024 04:09 AM       Electrolyte replacement as follows:   Replace potassium and phosphorus with 16 mmol of potassium phosphate administered IV over 4hours    Blood sugars over past 24 hours: 126-177    Blood sugar management:  Plan to Continue Humalog q4 hour sliding scale at low dose.    Plan to increase TPN to 75 ml/hr.    Thank you for allowing pharmacy to participate in the care of this patient.    Nghia Carrion RP, PharmD 11/26/2024   
  Clinical Pharmacy Note    Pharmacy consulted by Dr. Sheridan to manage TPN    Current TPN rate: 75 ml/hr  Goal TPN rate: 75 ml/hr    Access: PICC  Indication: Malnutrition    Labs:  General Labs:  CMP:    Lab Results   Component Value Date/Time     11/28/2024 03:40 AM    K 3.1 11/28/2024 03:40 AM    K 3.4 11/26/2024 04:09 AM     11/28/2024 03:40 AM    CO2 32 11/28/2024 03:40 AM    BUN 20 11/28/2024 03:40 AM    CREATININE 1.0 11/28/2024 03:40 AM    GFRAA >60 04/22/2022 11:25 AM    GFRAA 47 03/16/2013 03:15 AM    AGRATIO 0.9 11/23/2024 04:59 AM    LABGLOM 72 11/28/2024 03:40 AM    LABGLOM >60 05/16/2023 11:19 AM    GLUCOSE 191 11/28/2024 03:40 AM    CALCIUM 10.2 11/28/2024 03:40 AM    BILITOT 0.4 11/23/2024 04:59 AM    ALKPHOS 74 11/23/2024 04:59 AM    AST 18 11/23/2024 04:59 AM    ALT 13 11/23/2024 04:59 AM     Magnesium:    Lab Results   Component Value Date/Time    MG 2.45 11/28/2024 03:40 AM     Phosphorus:    Lab Results   Component Value Date/Time    PHOS 3.3 11/28/2024 03:40 AM       Electrolyte replacement as follows:   Replace potassium with 60 mEq of potassium chloride administered IV over 4 hours  Will continue to monitor calcium closely as requested by Dr. Urias  Na is 149 today, will continue to monitor as a D5W continuous IV infusion at 100 mL/hr has been started    Blood sugars over past 24 hours: 123-201    Blood sugar management:  Plan to Continue Humalog q4 hour sliding scale at low dose.    Plan to continue TPN at 75 ml/hr.    Thank you for allowing pharmacy to participate in the care of this patient.    Elana Saenz, PharmD  PGY-1 Pharmacy Resident      
  Clinical Pharmacy Note    Pharmacy consulted by Dr. Sheridan to manage TPN    Current TPN rate: 75ml/hr  Goal TPN rate: 75ml/hr    Access: Central  Indication: Malnutrition    Labs:  General Labs:  BMP:    Lab Results   Component Value Date/Time     12/07/2024 08:39 AM    K 4.1 12/07/2024 08:39 AM    K 3.4 11/26/2024 04:09 AM     12/07/2024 08:39 AM    CO2 29 12/07/2024 08:39 AM    BUN 23 12/07/2024 08:39 AM    CREATININE 0.9 12/07/2024 08:39 AM    CALCIUM 10.1 12/07/2024 08:39 AM    GFRAA >60 04/22/2022 11:25 AM    GFRAA 47 03/16/2013 03:15 AM    LABGLOM 82 12/07/2024 08:39 AM    LABGLOM >60 05/16/2023 11:19 AM    GLUCOSE 221 12/07/2024 08:39 AM       Electrolyte replacement as follows:   Replace phosphorous with 10 mmol of potassium phosphate administered IV over 2hours    Blood sugars over past 24 hours: 200-221    Blood sugar management:  Plan to Continue Humalog q4 hour sliding scale at medium dose.    Plan to continue TPN at 75 ml/hr.    Thank you for allowing pharmacy to participate in the care of this patient.    Nghia Carrion [facility], PharmD 12/7/2024   
  Clinical Pharmacy Note    Pharmacy consulted by Dr. Sheridan to manage TPN    Current TPN rate: 75ml/hr  Goal TPN rate: 75ml/hr    Access: Central  Indication: Malnutrition    Labs:  General Labs:  BMP:    Lab Results   Component Value Date/Time     12/08/2024 04:48 AM    K 3.5 12/08/2024 04:48 AM    K 3.4 11/26/2024 04:09 AM     12/08/2024 04:48 AM    CO2 30 12/08/2024 04:48 AM    BUN 26 12/08/2024 04:48 AM    CREATININE 0.8 12/08/2024 04:48 AM    CALCIUM 9.8 12/08/2024 04:48 AM    GFRAA >60 04/22/2022 11:25 AM    GFRAA 47 03/16/2013 03:15 AM    LABGLOM 85 12/08/2024 04:48 AM    LABGLOM >60 05/16/2023 11:19 AM    GLUCOSE 172 12/08/2024 04:48 AM       Electrolyte replacement as follows:   Replace phosphorous with 25 mmol of potassium phosphate administered IV over 4hours    Blood sugars over past 24 hours: 172-221    Blood sugar management:  Plan to Continue Humalog q4 hour sliding scale at medium dose.    Plan to continue TPN at 75 ml/hr.    Thank you for allowing pharmacy to participate in the care of this patient.    Nghia Carrion formerly [facility], PharmD 12/8/2024   
  Clinical Pharmacy Note    Pharmacy consulted by Dr. Sheridan to manage TPN    Current TPN rate: 75ml/hr  Goal TPN rate: 75ml/hr    Access: PICC  Indication: Malnutrition    Labs:  General Labs:  BMP:    Lab Results   Component Value Date/Time     11/29/2024 06:10 AM    K 3.2 11/29/2024 06:10 AM    K 3.4 11/26/2024 04:09 AM     11/29/2024 06:10 AM    CO2 33 11/29/2024 06:10 AM    BUN 19 11/29/2024 06:10 AM    CREATININE 0.9 11/29/2024 06:10 AM    CALCIUM 10.4 11/29/2024 06:10 AM    GFRAA >60 04/22/2022 11:25 AM    GFRAA 47 03/16/2013 03:15 AM    LABGLOM 82 11/29/2024 06:10 AM    LABGLOM >60 05/16/2023 11:19 AM    GLUCOSE 86 11/29/2024 06:10 AM       Electrolyte replacement as follows:   Replace potassium with 20 mEq of potassium chloride administered IV over 2hours  Replace phosphorous with 20 mmol of potassium phosphate administered IV over 4 hours  NA is 148 today. Will continue to monitor. D5W@100mL/hr currently.    Blood sugars over past 24 hours:     Blood sugar management:  Plan to Continue Humalog q4 hour sliding scale at low dose.    Plan to continue TPN at 75 ml/hr.    Thank you for allowing pharmacy to participate in the care of this patient.    Nghia Carrion [facility], PharmD 11/29/2024   
  [facility] - Inpatient Rehabilitation Department   Phone: [phone]    Occupational Therapy    [x] Initial Evaluation            [] Daily Treatment Note         [] Discharge Summary      Patient: Kevin Blair   : 1935   MRN: [id number]   Date of Service:  2024    Admitting Diagnosis:  Open abdominal wall wound, initial encounter  Current Admission Summary: Kevin Blair is a 89 y.o. male who presents with Open abdominal wall wound, initial encounter     : Pt s/p status post lysis of adhesions and small bowel resection with anastomosis.   Past Medical History:  has a past medical history of Arthritis, Diverticulitis, Hyperlipidemia, Hypertension, and S/P colon resection.  Past Surgical History:  has a past surgical history that includes TURP; colectomy; Tonsillectomy; Cataract removal; shoulder surgery; Knee arthroscopy; Carpal tunnel release; Total hip arthroplasty (2012); Small intestine surgery (N/A, 2024); laparotomy (N/A, 2024); and Small intestine surgery (N/A, 2024).    Discharge Recommendations: Kevin Blair scored a 14/24 on the AM-PAC ADL Inpatient form. Current research shows that an AM-PAC score of 18 or greater is typically associated with a discharge to the patient's home setting. Based on the patient's AM-PAC score, and their current ADL deficits, it is recommended that the patient have 2-3 sessions per week of Occupational Therapy at d/c to increase the patient's independence.  At this time, this patient demonstrates the endurance and safety to discharge home with HHOT (home vs OP services) and a follow up treatment frequency of 2-3x/wk.   Please see assessment section for further patient specific details.    If patient discharges prior to next session this note will serve as a discharge summary.  Please see below for the latest assessment towards goals.      DME Required For Discharge: shower chair with 
  [facility] - Inpatient Rehabilitation Department   Phone: [phone]    Physical Therapy    [] Initial Evaluation            [x] Daily Treatment Note         [] Discharge Summary      Patient: Kevin Blair   : 1935   MRN: [id number]   Date of Service:  12/10/2024  Admitting Diagnosis: Open abdominal wall wound, initial encounter  Current Admission Summary: eKvin Blair is a 89 y.o. male who presents with abdominal drainage. 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 Pt had intra-op perforation and contaminated field. Fascia closed, skin left open. Pt had 1 week recovery inpt, discharged . Seen in office yesterday with Dr. Sheridan due to wound drainage, site evaluated, dehiscence noted, old sutures removed. VAC planned, however drainage became much worse and pt came to ER. Pt admitted. Mild pain, no fever, no emesis, is passing gas. Has green fluid in the wound. S/p ex lap and small bowel resection on . Placement of wound vac    Past Medical History:  has a past medical history of Arthritis, Diverticulitis, Hyperlipidemia, Hypertension, and S/P colon resection.  Past Surgical History:  has a past surgical history that includes TURP; colectomy; Tonsillectomy; Cataract removal; shoulder surgery; Knee arthroscopy; Carpal tunnel release; Total hip arthroplasty (2012); Small intestine surgery (N/A, 2024); laparotomy (N/A, 2024); Small intestine surgery (N/A, 2024); and Upper gastrointestinal endoscopy (N/A, 12/10/2024).  Discharge Recommendations: Kevin Blair scored a 17/24 on the AM-PAC short mobility form. Current research shows that an AM-PAC score of 17 or less is typically not associated with a discharge to the patient's home setting. Based on the patient's AM-PAC score and their current functional mobility deficits, it is recommended that the patient have 3-5 sessions per week of Physical 
  [facility] - Inpatient Rehabilitation Department   Phone: [phone]    Occupational Therapy    [] Initial Evaluation            [x] Daily Treatment Note         [] Discharge Summary      Patient: Kevin Blair   : 1935   MRN: [id number]   Date of Service:  12/3/2024    Admitting Diagnosis:  Open abdominal wall wound, initial encounter  Current Admission Summary: Kevin Blair is a 89 y.o. male who presents with Open abdominal wall wound, initial encounter     : Pt s/p status post lysis of adhesions and small bowel resection with anastomosis.     Past Medical History:  has a past medical history of Arthritis, Diverticulitis, Hyperlipidemia, Hypertension, and S/P colon resection.  Past Surgical History:  has a past surgical history that includes TURP; colectomy; Tonsillectomy; Cataract removal; shoulder surgery; Knee arthroscopy; Carpal tunnel release; Total hip arthroplasty (2012); Small intestine surgery (N/A, 2024); laparotomy (N/A, 2024); and Small intestine surgery (N/A, 2024).    Discharge Recommendations: Kevin Blair scored a 17/24 on the AM-PAC ADL Inpatient form. Current research shows that an AM-PAC score of 18 or greater is typically associated with a discharge to the patient's home setting. Based on the patient's AM-PAC score, and their current ADL deficits, it is recommended that the patient have 2-3 sessions per week of Occupational Therapy at d/c to increase the patient's independence.  At this time, this patient demonstrates the endurance and safety to discharge home with HHOT (home vs OP services) and a follow up treatment frequency of 2-3x/wk.   Please see assessment section for further patient specific details.    If patient discharges prior to next session this note will serve as a discharge summary.  Please see below for the latest assessment towards goals.      DME Required For Discharge: shower chair with 
  [facility] - Inpatient Rehabilitation Department   Phone: [phone]    Physical Therapy    [] Initial Evaluation            [x] Daily Treatment Note         [] Discharge Summary      Patient: Kevin Blair   : 1935   MRN: [id number]   Date of Service:  2024  Admitting Diagnosis: Open abdominal wall wound, initial encounter  Current Admission Summary: Kevin Blair is a 89 y.o. male who presents with abdominal drainage. 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 Pt had intra-op perforation and contaminated field. Fascia closed, skin left open. Pt had 1 week recovery inpt, discharged . Seen in office yesterday with Dr. Sheridan due to wound drainage, site evaluated, dehiscence noted, old sutures removed. VAC planned, however drainage became much worse and pt came to ER. Pt admitted. Mild pain, no fever, no emesis, is passing gas. Has green fluid in the wound. S/p ex lap and small bowel resection on . Placement of wound vac    Past Medical History:  has a past medical history of Arthritis, Diverticulitis, Hyperlipidemia, Hypertension, and S/P colon resection.  Past Surgical History:  has a past surgical history that includes TURP; colectomy; Tonsillectomy; Cataract removal; shoulder surgery; Knee arthroscopy; Carpal tunnel release; Total hip arthroplasty (2012); Small intestine surgery (N/A, 2024); laparotomy (N/A, 2024); and Small intestine surgery (N/A, 2024).  Discharge Recommendations: Kevin Blair scored a 18/24 on the AM-PAC short mobility form. Current research shows that an AM-PAC score of 18 or greater is typically associated with a discharge to the patient's home setting. Based on the patient's AM-PAC score and their current functional mobility deficits, it is recommended that the patient have 2-3 sessions per week of Physical Therapy at d/c to increase the patient's independence.  At 
  Speech Language Pathology  [facility] - Inpatient Rehabilitation Services  [phone]  SLP Dysphagia Treatment      Patient: Kevin Blair   : 1935   MRN: [id number]      Evaluation Date: 12/10/2024      Admitting Dx: Goals of care, counseling/discussion [Z71.89]  Inadequate oral intake [R63.8]  Open abdominal wall wound, initial encounter [S31.109A]  Postoperative surgical complication involving skin associated with non-dermatologic procedure, unspecified complication [L76.82]  Treatment Diagnosis: Oropharyngeal Dysphagia   Pain: Did not state                                  Recommendations      Recommended Diet and Intervention 12/10/2024:  Diet Solids Recommendation:  Dysphagia II Minced & Moist Liquid Consistency Recommendation:  Thin Liquids Recommended form of Meds: Meds crushed as able in puree      Compensatory strategies: Effortful Swallows , Upright as possible with all PO intake , Small bites/sips , Swallow 2 times per bite , Eat/feed slowly, Cough/re-swallow , Aspiration Precautions     Discharge Recommendations: Recommend ongoing SLP for intensive dysphagia therapy upon discharge from hospital     History/Course of Treatment     H&P: \"Kevin Blair is a 89 y.o. male with pmh of HLD, HTN,  who presents with green drainage from open abdominal wound.  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 His abdominal wound was intentionally left open and he was discharged with wound care.  He saw Dr. Sheridan today and it was noted the fascia had appeared to be , loose PDS sutures were removed and the base of the wound was gently debrided.  There was no signs of evisceration. After returning home he started having a large amount of liquid drainage.  In the ER his dressing has been changed twice secondary to drainage.  He denies 
  Speech Language Pathology  [facility] - Inpatient Rehabilitation Services  [phone]  SLP Dysphagia Treatment      Patient: Kevin Blair   : 1935   MRN: [id number]      Evaluation Date: 2024      Admitting Dx: Goals of care, counseling/discussion [Z71.89]  Inadequate oral intake [R63.8]  Open abdominal wall wound, initial encounter [S31.109A]  Postoperative surgical complication involving skin associated with non-dermatologic procedure, unspecified complication [L76.82]  Treatment Diagnosis: Oropharyngeal Dysphagia   Pain: Did not state                                  Recommendations      Recommended Diet and Intervention 2024:  Diet Solids Recommendation:  Dysphagia II Minced & Moist Liquid Consistency Recommendation:  Thin Liquids Recommended form of Meds: Meds crushed as able in puree      Compensatory strategies: Effortful Swallows , Upright as possible with all PO intake , Small bites/sips , Swallow 2 times per bite , Eat/feed slowly, Cough/re-swallow , Aspiration Precautions     Discharge Recommendations: Recommend ongoing SLP for intensive dysphagia therapy upon discharge from hospital     History/Course of Treatment     H&P: \"Kevin Blair is a 89 y.o. male with pmh of HLD, HTN,  who presents with green drainage from open abdominal wound.  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 His abdominal wound was intentionally left open and he was discharged with wound care.  He saw Dr. Sheridan today and it was noted the fascia had appeared to be , loose PDS sutures were removed and the base of the wound was gently debrided.  There was no signs of evisceration. After returning home he started having a large amount of liquid drainage.  In the ER his dressing has been changed twice secondary to drainage.  He denies any 
  Speech Language Pathology  [facility] - Inpatient Rehabilitation Services  [phone]  SLP Dysphagia Treatment      Patient: Kevin Blair   : 1935   MRN: [id number]      Evaluation Date: 2024      Admitting Dx: Goals of care, counseling/discussion [Z71.89]  Inadequate oral intake [R63.8]  Open abdominal wall wound, initial encounter [S31.109A]  Postoperative surgical complication involving skin associated with non-dermatologic procedure, unspecified complication [L76.82]  Treatment Diagnosis: Oropharyngeal Dysphagia   Pain: Did not state                                  Recommendations      Recommended Diet and Intervention 2024:  Diet Solids Recommendation:  Full liquid diet Liquid Consistency Recommendation:  Full liquid diet Recommended form of Meds: Meds via alternative means      *Recommend Modified Barium Swallow (MBS) Study to re-assess oropharyngeal swallow function to assist with directing dysphagia plan of care    Compensatory strategies: Effortful Swallows , Upright as possible with all PO intake , Remain upright 30-45 min , Cough/re-swallow , Aspiration Precautions     Discharge Recommendations: Recommend ongoing SLP for intensive dysphagia therapy upon discharge from hospital     History/Course of Treatment     H&P: \"Kevin Blair is a 89 y.o. male with pmh of HLD, HTN,  who presents with green drainage from open abdominal wound.  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 His abdominal wound was intentionally left open and he was discharged with wound care.  He saw Dr. Sheridan today and it was noted the fascia had appeared to be , loose PDS sutures were removed and the base of the wound was gently debrided.  There was no signs of evisceration. After returning home he started having a large amount of 
0815: Shift assessment done, VSS, A/O. Neuro checks WNL.  Denies pain at this time. Meds given per MAR. Standard safety measures in place. The care plan and education has been reviewed and mutually agreed upon with the patient.    Dressing changed early this AM by nursing. Will change before nightshift arrives.     Patients oral intake has increased, had about 25% of meals and 3 bottles of ensure.     Continues on TPN.     GI saw patient: encouraged patient to eat and reevaluate  PEG tube placement.     Electronically signed by Niharika Boggs RN on 12/7/2024 at 3:56 PM    
1948: shift assessment done, VSS, A&O x4, this patient has no questions or concerns at this time, fall precautions in place, call light within reach, plan of care ongoing. The care plan and education has been reviewed and mutually agreed upon with the patient.     
2055: shift assessment done, VSS, denies any pain at the moment, call light within reach, bed alarm on, plan of care ongoing. The care plan and education has been reviewed and mutually agreed upon with the patient.     0645: blood transfusion started    
6 weeks             [facility]        Progress Note    Patient Name: Kevin Blair  MRN: [id number]  YOB: 1935  Date of Evaluation: 12/3/2024    Subjective:  No acute events overnight  Pain controlled without narcotics  No nausea or vomiting, appetite remains poor  Passing flatus and stool  Up to chair at this time    Post-Operative Day #8      Vital Signs:  Patient Vitals for the past 24 hrs:   BP Temp Temp src Pulse Resp SpO2 Height Weight   24 0857 -- -- -- -- -- -- 1.74 m (5' 8.5\") --   24 0815 138/63 97.7 °F (36.5 °C) Oral 73 16 99 % -- --   24 0550 -- -- -- -- -- -- -- 53.7 kg (118 lb 6.2 oz)   24 2040 138/72 97.8 °F (36.6 °C) Oral 72 16 97 % -- --      TEMPERATURE HISTORY 24H: Temp (24hrs), Av.8 °F (36.6 °C), Min:97.7 °F (36.5 °C), Max:97.8 °F (36.6 °C)    BLOOD PRESSURE HISTORY: Systolic (36hrs), Av , Min:130 , Max:165    Diastolic (36hrs), Av, Min:63, Max:72      Intake/Output:  I/O last 3 completed shifts:  In: .3 [Blood:308]  Out: 1900 [Urine:1900]  No intake/output data recorded.  Drain/tube Output:       Physical Exam:  General: awake, alert, oriented to person, place, time  Lungs: unlabored respirations  Abdomen: soft, non-distended, incisional tenderness only, bowel sounds present  Skin/Wound: wound bed not assessed at this time, wound VAC in place, seal/suction intact    Labs:  CBC:    Recent Labs     24  0450 24  1225 24  0835   WBC 5.8  --  8.0   HGB 6.9* 8.2* 8.3*   HCT 20.6* 25.3* 25.6*     --  218     BMP:    Recent Labs     24  0426 24  0450 24  0625   * 146* 147*   K 3.8 3.5 3.4*   * 112* 111*   CO2 31 30 31   BUN 23* 20 19   CREATININE 0.9 0.9 0.9   GLUCOSE 201* 231* 219*     Hepatic:  No results for input(s): \"AST\", \"ALT\", \"BILITOT\", \"ALKPHOS\" in the last 72 hours.    Invalid input(s): \"ALB\"  Amylase:  No results found for: \"AMYLASE\"  Lipase:    Lab 
Assessment completed, see doc flowsheets. Pt is A&O X4. Lung sounds are clear. VSS. Tele on. Medication given per MAR. Patient has no needs at this time. Call light within in reach, will continue to monitor.     
Assessment completed. VSS. Patient resting comfortably in bed. No complaints of pain or discomfort at this time. Medications given per MAR. External catheter in place and working well to keep patients skin dry. All dressing CDI and wound vac remains connected. Safety precautions continued. Camera in place for additional safety measures. Call light within reach. Will continue to monitor.    
Brief Nutrition Note      Consult received to see pt today; RD has been following. ONS ordered today per CNP with note to \"please send full sized Ensure in chocolate or vanilla. Also send Magic Cups and Ensure pudding\". Currently limited stock of Ensure products so pt likely has been receiving Ensure Compact. Ensure pudding not on hospital formulary. Ordered Magic Cup and Gelatein each BID for trial, will monitor for acceptance at follow-up tomorrow.    If po intake remains poor, recommend initiation of EN.    Electronically signed by Dorothy Marina MS, RD, LD on 12/9/2024 at 12:42 PM  Contact: [phone]  
Incentive Spirometry education and demonstration completed by Respiratory Therapy Yes      Response to education: Excellent     Teaching Time: 5 minutes    Minimum Predicted Vital Capacity - 684 mL.  Patient's Actual Vital Capacity - 1500 mL. Turning over to Nursing for routine follow-up Yes.      Electronically signed by Marquita Trevino RCP on 12/2/2024 at 6:23 PM   
Kevin Blair is a 89 y.o. male patient.    Current Facility-Administered Medications   Medication Dose Route Frequency Provider Last Rate Last Admin    PN-Adult Premix 5/20 - Standard Electrolytes - Central Line   IntraVENous Continuous TPN Graham Bishop MD        potassium phosphate 15 mmol in sodium chloride 0.9 % 250 mL IVPB  15 mmol IntraVENous Once Graham Bishop MD 62.5 mL/hr at 12/01/24 1144 15 mmol at 12/01/24 1144    insulin lispro (HUMALOG,ADMELOG) injection vial 0-8 Units  0-8 Units SubCUTAneous 4x Daily AC & HS Graham Bishop MD        pantoprazole (PROTONIX) injection 40 mg  40 mg IntraVENous Daily Nghia Sheridan MD   40 mg at 12/01/24 0923    PN-Adult Premix 5/20 - Standard Electrolytes - Central Line   IntraVENous Continuous TPN Graham Bishop MD 75 mL/hr at 11/30/24 1807 New Bag at 11/30/24 1807    fat emulsion (INTRALIPID/NUTRILIPID) 20 % infusion 250 mL  250 mL IntraVENous Once per day on Monday Thursday Татьяна Schmitz MD   Stopped at 11/29/24 0925    thiamine (B-1) injection 100 mg  100 mg IntraVENous Daily Татьяна Schmitz MD   100 mg at 12/01/24 0925    phenol 1.4 % mouth spray 1 spray  1 spray Mouth/Throat Q2H PRN Татьяна Schmitz MD        enoxaparin (LOVENOX) injection 40 mg  40 mg SubCUTAneous Daily Nghia Sheridan MD   40 mg at 12/01/24 0923    HYDROmorphone (DILAUDID) injection 0.25 mg  0.25 mg IntraVENous Q3H PRN Nghia Sheridan MD   0.25 mg at 11/29/24 1626    Or    HYDROmorphone (DILAUDID) injection 0.5 mg  0.5 mg IntraVENous Q3H PRN Nghia Sheridan MD   0.5 mg at 11/30/24 1227    dextrose bolus 10% 125 mL  125 mL IntraVENous PRN Nghia Sheridan MD   Stopped at 11/24/24 1104    Or    dextrose bolus 10% 250 mL  250 mL IntraVENous PRN Nghia Sheridan MD   Stopped at 11/24/24 1054    glucagon injection 1 mg  1 mg SubCUTAneous PRN Nghia Sheridan MD        dextrose 10 % infusion   IntraVENous Continuous PRN Nghia Sheridan MD        
Kevin Blair is a 89 y.o. male patient.    Current Facility-Administered Medications   Medication Dose Route Frequency Provider Last Rate Last Admin    PN-Adult Premix 5/20 - Standard Electrolytes - Central Line   IntraVENous Continuous TPN Nghia Sheridan MD        ferric gluconate (FERRLECIT) 125 mg in sodium chloride 0.9 % 100 mL IVPB  125 mg IntraVENous Daily Татьяна Schmitz MD   Stopped at 11/27/24 1109    dextrose 5 % solution   IntraVENous Continuous Bridgett, Han Marsh MD        thiamine (B-1) injection 100 mg  100 mg IntraVENous Daily Татьяна Schmitz MD   100 mg at 11/27/24 0929    PN-Adult Premix 5/20 - Standard Electrolytes - Central Line   IntraVENous Continuous TPN Татьяна Schmitz MD 75 mL/hr at 11/26/24 1810 New Bag at 11/26/24 1810    phenol 1.4 % mouth spray 1 spray  1 spray Mouth/Throat Q2H PRN Татьяна Schmitz MD        enoxaparin (LOVENOX) injection 40 mg  40 mg SubCUTAneous Daily Nghia Sheridan MD   40 mg at 11/27/24 0929    HYDROmorphone (DILAUDID) injection 0.25 mg  0.25 mg IntraVENous Q3H PRN Nghia Sheridan MD        Or    HYDROmorphone (DILAUDID) injection 0.5 mg  0.5 mg IntraVENous Q3H PRN Nghia Sheridan MD   0.5 mg at 11/27/24 1010    insulin lispro (HUMALOG,ADMELOG) injection vial 0-4 Units  0-4 Units SubCUTAneous Q4H Nghia Sheridan MD   1 Units at 11/26/24 0923    dextrose bolus 10% 125 mL  125 mL IntraVENous PRN Nghia Sheridan MD   Stopped at 11/24/24 1104    Or    dextrose bolus 10% 250 mL  250 mL IntraVENous PRN Nghia Sheridan MD   Stopped at 11/24/24 1054    glucagon injection 1 mg  1 mg SubCUTAneous PRN Nghia Sheridan MD        dextrose 10 % infusion   IntraVENous Continuous PRN Nghia Sheridan MD        sodium chloride flush 0.9 % injection 5-40 mL  5-40 mL IntraVENous 2 times per day Nghia Sheridan MD   10 mL at 11/27/24 0929    sodium chloride flush 0.9 % injection 5-40 mL  5-40 mL IntraVENous PRN 
Kevin Blair is a 89 y.o. male patient.    Current Facility-Administered Medications   Medication Dose Route Frequency Provider Last Rate Last Admin    PN-Adult Premix 5/20 - Standard Electrolytes - Central Line   IntraVENous Continuous TPN Татьяна Schmitz MD        PN-Adult Premix 5/20 - Standard Electrolytes - Central Line   IntraVENous Continuous TPN Chapo Aquino MD 75 mL/hr at 12/04/24 1811 New Bag at 12/04/24 1811    metoclopramide (REGLAN) injection 5 mg  5 mg IntraVENous Q6H Susy Ashraf APRN - CNP   5 mg at 12/05/24 0520    0.9 % sodium chloride infusion   IntraVENous PRN Navi Aviles DO        insulin lispro (HUMALOG,ADMELOG) injection vial 0-8 Units  0-8 Units SubCUTAneous 4x Daily AC & HS Graham Bishop MD   2 Units at 12/05/24 1135    pantoprazole (PROTONIX) injection 40 mg  40 mg IntraVENous Daily Nghia Sheridan MD   40 mg at 12/05/24 0930    fat emulsion (INTRALIPID/NUTRILIPID) 20 % infusion 250 mL  250 mL IntraVENous Once per day on Monday Thursday Татьяна Schmitz MD   Stopped at 12/03/24 0611    thiamine (B-1) injection 100 mg  100 mg IntraVENous Daily Татьяна Schmitz MD   100 mg at 12/04/24 0855    phenol 1.4 % mouth spray 1 spray  1 spray Mouth/Throat Q2H PRN Татьяна Schmitz MD        enoxaparin (LOVENOX) injection 40 mg  40 mg SubCUTAneous Daily Nghia Sheridan MD   40 mg at 12/05/24 0930    HYDROmorphone (DILAUDID) injection 0.25 mg  0.25 mg IntraVENous Q3H PRN Nghia Sheridan MD   0.25 mg at 11/29/24 1626    Or    HYDROmorphone (DILAUDID) injection 0.5 mg  0.5 mg IntraVENous Q3H PRN Nghia Sheridan MD   0.5 mg at 11/30/24 1227    dextrose bolus 10% 125 mL  125 mL IntraVENous PRN Nghia Sheridan MD   Stopped at 11/24/24 1104    Or    dextrose bolus 10% 250 mL  250 mL IntraVENous PRN Nghia Sheridan MD   Stopped at 11/24/24 1054    glucagon injection 1 mg  1 mg SubCUTAneous PRN Nghia Sheridan MD        dextrose 10 % infusion   
Kevin Blair is a 89 y.o. male patient.    Current Facility-Administered Medications   Medication Dose Route Frequency Provider Last Rate Last Admin    PN-Adult Premix 5/20 - Standard Electrolytes - Central Line   IntraVENous Continuous TPN Татьяна Schmitz MD        PN-Adult Premix 5/20 - Standard Electrolytes - Central Line   IntraVENous Continuous TPN Татьяна Schmitz MD 75 mL/hr at 12/05/24 1904 New Bag at 12/05/24 1904    diatrizoate meglumine-sodium (GASTROGRAFIN) 66-10 % solution 20 mL  20 mL Oral ONCE PRN Nghia Sheridan MD   20 mL at 12/05/24 1511    metoclopramide (REGLAN) injection 5 mg  5 mg IntraVENous Q6H Susy Ashraf APRN - CNP   5 mg at 12/06/24 1337    0.9 % sodium chloride infusion   IntraVENous PRN Navi Aviles DO        insulin lispro (HUMALOG,ADMELOG) injection vial 0-8 Units  0-8 Units SubCUTAneous 4x Daily AC & HS Graham Bishop MD   2 Units at 12/06/24 1123    pantoprazole (PROTONIX) injection 40 mg  40 mg IntraVENous Daily Nghia Sheridan MD   40 mg at 12/06/24 1018    fat emulsion (INTRALIPID/NUTRILIPID) 20 % infusion 250 mL  250 mL IntraVENous Once per day on Monday Thursday Татьяна Schmitz MD   Stopped at 12/06/24 0922    thiamine (B-1) injection 100 mg  100 mg IntraVENous Daily Татьяна Schmitz MD   100 mg at 12/06/24 1025    phenol 1.4 % mouth spray 1 spray  1 spray Mouth/Throat Q2H PRN Татьяна Schmitz MD        enoxaparin (LOVENOX) injection 40 mg  40 mg SubCUTAneous Daily Nghia Sheridan MD   40 mg at 12/06/24 1018    HYDROmorphone (DILAUDID) injection 0.25 mg  0.25 mg IntraVENous Q3H PRN Nghia Sheridan MD   0.25 mg at 11/29/24 1626    Or    HYDROmorphone (DILAUDID) injection 0.5 mg  0.5 mg IntraVENous Q3H PRN Nghia Sheridan MD   0.5 mg at 11/30/24 1227    dextrose bolus 10% 125 mL  125 mL IntraVENous PRN Nghia Sheridan MD   Stopped at 11/24/24 1104    Or    dextrose bolus 10% 250 mL  250 mL IntraVENous PRN Nghia Sheridan MD  
Kevin Blair is a 89 y.o. male patient.    Current Facility-Administered Medications   Medication Dose Route Frequency Provider Last Rate Last Admin    PN-Adult Premix 5/20 - Standard Electrolytes - Central Line   IntraVENous Continuous TPN Татьяна Schmitz MD        fat emulsion (INTRALIPID/NUTRILIPID) 20 % infusion 250 mL  250 mL IntraVENous Once per day on Monday Thursday Татьяна Schmitz MD        dextrose 5 % solution   IntraVENous Continuous BridgettHan  mL/hr at 11/28/24 1102 New Bag at 11/28/24 1102    potassium chloride 10 mEq/100 mL IVPB (Peripheral Line)  10 mEq IntraVENous Q1H Han Urias  mL/hr at 11/28/24 1104 10 mEq at 11/28/24 1104    PN-Adult Premix 5/20 - Standard Electrolytes - Central Line   IntraVENous Continuous TPN Nghia Sheridan MD 75 mL/hr at 11/28/24 0353 Rate Verify at 11/28/24 0353    thiamine (B-1) injection 100 mg  100 mg IntraVENous Daily Татьяна Schmitz MD   100 mg at 11/28/24 0903    phenol 1.4 % mouth spray 1 spray  1 spray Mouth/Throat Q2H PRN Татьяна Schmitz MD        enoxaparin (LOVENOX) injection 40 mg  40 mg SubCUTAneous Daily Nghia Sheridan MD   40 mg at 11/28/24 0903    HYDROmorphone (DILAUDID) injection 0.25 mg  0.25 mg IntraVENous Q3H PRN Nghia Sheridan MD   0.25 mg at 11/28/24 0342    Or    HYDROmorphone (DILAUDID) injection 0.5 mg  0.5 mg IntraVENous Q3H PRN Nghia Sheridan MD   0.5 mg at 11/27/24 1010    insulin lispro (HUMALOG,ADMELOG) injection vial 0-4 Units  0-4 Units SubCUTAneous Q4H Nghia Sheridan MD   1 Units at 11/26/24 0923    dextrose bolus 10% 125 mL  125 mL IntraVENous PRN Nghia Sheridan MD   Stopped at 11/24/24 1104    Or    dextrose bolus 10% 250 mL  250 mL IntraVENous PRN Nghia Sheridan MD   Stopped at 11/24/24 1054    glucagon injection 1 mg  1 mg SubCUTAneous PRN Nghia Sheridan MD        dextrose 10 % infusion   IntraVENous Continuous PRN Nghia Sheridan, 
Kevin Blair is a 89 y.o. male patient.    Current Facility-Administered Medications   Medication Dose Route Frequency Provider Last Rate Last Admin    pantoprazole (PROTONIX) injection 40 mg  40 mg IntraVENous Daily Nghia Sheridan MD   40 mg at 11/30/24 1040    PN-Adult Premix 5/20 - Standard Electrolytes - Central Line   IntraVENous Continuous TPN Graham Bishop MD        potassium chloride 10 mEq/100 mL IVPB (Peripheral Line)  10 mEq IntraVENous Q1H Graham Bishop MD        PN-Adult Premix 5/20 - Standard Electrolytes - Central Line   IntraVENous Continuous TPN Divina Funk MD 75 mL/hr at 11/29/24 1829 New Bag at 11/29/24 1829    fat emulsion (INTRALIPID/NUTRILIPID) 20 % infusion 250 mL  250 mL IntraVENous Once per day on Monday Thursday Татьяна Schmitz MD   Stopped at 11/29/24 0925    thiamine (B-1) injection 100 mg  100 mg IntraVENous Daily Татьяна Schmitz MD   100 mg at 11/30/24 1040    phenol 1.4 % mouth spray 1 spray  1 spray Mouth/Throat Q2H PRN Татьяна Schmitz MD        enoxaparin (LOVENOX) injection 40 mg  40 mg SubCUTAneous Daily Nghia Sheridan MD   40 mg at 11/30/24 1035    HYDROmorphone (DILAUDID) injection 0.25 mg  0.25 mg IntraVENous Q3H PRN Nghia Sheridan MD   0.25 mg at 11/29/24 1626    Or    HYDROmorphone (DILAUDID) injection 0.5 mg  0.5 mg IntraVENous Q3H PRN Nghia Sheridan MD   0.5 mg at 11/29/24 2340    insulin lispro (HUMALOG,ADMELOG) injection vial 0-4 Units  0-4 Units SubCUTAneous Q4H Nghia Sheridan MD   1 Units at 11/29/24 1624    dextrose bolus 10% 125 mL  125 mL IntraVENous PRN Nghia Sheridan MD   Stopped at 11/24/24 1104    Or    dextrose bolus 10% 250 mL  250 mL IntraVENous PRN Nghia Sheridan MD   Stopped at 11/24/24 1054    glucagon injection 1 mg  1 mg SubCUTAneous PRN Nghia Sheridan MD        dextrose 10 % infusion   IntraVENous Continuous PRN Nghia Sheridan MD        sodium chloride flush 0.9 % injection 5-40 
Kevin Blair is a 89 y.o. male patient.    Current Facility-Administered Medications   Medication Dose Route Frequency Provider Last Rate Last Admin    potassium phosphate 20 mmol in sodium chloride 0.9 % 500 mL IVPB  20 mmol IntraVENous Once Divina Funk  mL/hr at 11/29/24 1134 20 mmol at 11/29/24 1134    PN-Adult Premix 5/20 - Standard Electrolytes - Central Line   IntraVENous Continuous TPN Divina Funk MD        PN-Adult Premix 5/20 - Standard Electrolytes - Central Line   IntraVENous Continuous TPN Татьяна Schmitz MD 75 mL/hr at 11/28/24 1821 Rate Verify at 11/28/24 1821    fat emulsion (INTRALIPID/NUTRILIPID) 20 % infusion 250 mL  250 mL IntraVENous Once per day on Monday Thursday Татьяна Schmitz MD   Stopped at 11/29/24 0925    thiamine (B-1) injection 100 mg  100 mg IntraVENous Daily Татьяна Schmitz MD   100 mg at 11/29/24 0915    phenol 1.4 % mouth spray 1 spray  1 spray Mouth/Throat Q2H PRN Татьяна Schmitz MD        enoxaparin (LOVENOX) injection 40 mg  40 mg SubCUTAneous Daily Nghia Sheridan MD   40 mg at 11/29/24 0915    HYDROmorphone (DILAUDID) injection 0.25 mg  0.25 mg IntraVENous Q3H PRN Nghia Sheridan MD   0.25 mg at 11/28/24 0342    Or    HYDROmorphone (DILAUDID) injection 0.5 mg  0.5 mg IntraVENous Q3H PRN Nghia Sheridan MD   0.5 mg at 11/29/24 1149    insulin lispro (HUMALOG,ADMELOG) injection vial 0-4 Units  0-4 Units SubCUTAneous Q4H Nghia Sheridan MD   1 Units at 11/29/24 0254    dextrose bolus 10% 125 mL  125 mL IntraVENous PRN Nghia Sheridan MD   Stopped at 11/24/24 1104    Or    dextrose bolus 10% 250 mL  250 mL IntraVENous PRN Nghia Sheridan MD   Stopped at 11/24/24 1054    glucagon injection 1 mg  1 mg SubCUTAneous PRN Nghia Sheridan MD        dextrose 10 % infusion   IntraVENous Continuous PRN Nghia Sheridan MD        sodium chloride flush 0.9 % injection 5-40 mL  5-40 mL IntraVENous 2 times per day Arvin 
MD talked to pt about blood consent via phone with witness of this RN. Pt signed blood consent. Consent in chart.   
Minimal leaking noted at urethral opening. Chandler remains in place, draining and patent. Urology paged.     1271: Per Dr. Martin, continue to monitor leaking and ensure chandler catheter remains patent and draining. Plan to begin VT once bowel function resumes.   
NUTRITION NOTE: REFEEDING SYNDROME    NUTRITION SUPPORT RECOMMENDATIONS  Con't TPN @ goal rate of 75 ml/hr.  Pharmacy to con't replacing electrolytes as needed.      Pt is being monitored for refeeding syndrome as nutrition support is initiated:  [x]Labs: K+, Mg+ and Phos have been ordered daily   [x]Thiamine has been ordered daily  []Pt is receiving oral or IV MVI daily     Current Parenteral Nutrition Orders:  Type and Formula: Premix Central   Lipids: 250ml, Two times weekly  Duration: Continuous  Rate/Volume: Clinimix 5/20 @ 41 ml/hr provides 984 ml  Goal PN Orders Provides: Clinimix 5/20 TPN @ goal rate of 75 ml/hr provides 1800 ml; 1584 kcal, 90 g pro & 5.0 dex load    Potassium (mmol/L)   Date Value   11/27/2024 3.5     Potassium reflex Magnesium (mmol/L)   Date Value   11/26/2024 3.4 (L)   11/25/2024 3.2 (L)   11/24/2024 4.0      Magnesium (mg/dL)   Date Value   11/27/2024 2.45 (H)   11/26/2024 2.37   11/25/2024 2.63 (H)      Phosphorus (mg/dL)   Date Value   11/27/2024 2.2 (L)        Anjali Krishnan RD, LD    Contact: [phone]            
NUTRITION NOTE: REFEEDING SYNDROME    NUTRITION SUPPORT RECOMMENDATIONS  Replace lytes & advance TPN to goal rate of 75 ml/hr.    Pt is being monitored for refeeding syndrome as nutrition support is initiated:  [x]Labs: K+, Mg+ and Phos have been ordered daily.   [x]Thiamine has been ordered at 100 mg daily.   []Pt is receiving oral or IV MVI daily     Current Parenteral Nutrition Orders:  Type and Formula: Premix Central   Lipids: 250ml, Two times weekly  Duration: Continuous  Rate/Volume: Clinimix 5/20 @ 41 ml/hr provides 984 ml  Goal PN Orders Provides: Clinimix 5/20 TPN @ goal rate of 75 ml/hr provides 1800 ml; 1584 kcal, 90 g pro & 5.0 dex load    Potassium reflex Magnesium (mmol/L)   Date Value   11/26/2024 3.4 (L)   11/25/2024 3.2 (L)   11/24/2024 4.0      Magnesium (mg/dL)   Date Value   11/26/2024 2.37   11/25/2024 2.63 (H)   11/24/2024 2.42 (H)      Phosphorus (mg/dL)   Date Value   11/26/2024 2.4 (L)        Anjali Krishnan RD, LD    Contact: [phone]            
Nutrition Note    RECOMMENDATIONS  PO Diet: Advancement per surgery  ONS: NPO  Nutrition Support:   Continue Clinimix 5/20 at goal rate 75 mL/hr.   Continue 250 mL 20% lipids 2 times per week  Pharmacy to adjust MVI and Trace Elements as needed     ASSESSMENT   Pt seen for follow up assessment. Pt remains NPO with NG tube to suction. No BM noted yet but +flatus. Pt continues to tolerate Clinimix 5/20 at goal rate 75 mL/hr. K+ 3.2; has been low but stable, not unexpected with NG tube. Phos has fluctuated throughout admission; 2.2 today. Both K+ and Phos have been ordered for replacement today.       Malnutrition Status: Moderate malnutrition  Acute Illness  Findings of the 6 clinical characteristics of malnutrition:  Energy Intake:  50% or less of estimated energy requirements for 5 or more days  Weight Loss:  Unable to assess     Body Fat Loss:  Mild body fat loss Fat Overlying Ribs (from assessment 11/14)   Muscle Mass Loss:  Mild muscle mass loss Temples (temporalis) (from assessment 11/14)  Fluid Accumulation:  No fluid accumulation     Strength:  Not Performed      NUTRITION DIAGNOSIS   Inadequate oral intake related to altered GI function, Altered GI structure as evidenced by NPO or clear liquid status due to medical condition, nutrition support - parenteral nutrition    Goals: Tolerate nutrition support at goal rate     NUTRITION RELATED FINDINGS  Objective: No edema noted. No BM post-op. Mg+ 2.44. Na+ 148.  Wounds: Surgical Incision, Wound Vac    CURRENT NUTRITION THERAPIES  Diet NPO Exceptions are: Ice Chips, Popsicles  PN-Adult Premix 5/20 - Standard Electrolytes - Central Line  PN-Adult Premix 5/20 - Standard Electrolytes - Central Line       PO Intake: NPO   PO Supplement Intake:NPO    Current Parenteral Nutrition Orders:  Type and Formula: Premix Central (Clinimix 5/20)   Lipids: 250ml, Two times weekly  Duration: Continuous  Rate/Volume: 75 mL/hr  Goal PN Orders Provides: 1800 mL total volume, 1584 
Nutrition Note    RECOMMENDATIONS  PO Diet: NPO  Nutrition support:    Pt has been identified as at risk for refeeding syndrome per ASPEN guidelines. The following is recommended to reduce the risk of refeeding syndrome:    Check potassium, magnesium, and phosphorus prior to initiating nutrition and daily x 3 days following initiation of nutrition support.   In patients with severely low electrolytes, consider holding initiation of or increasing rate of nutrition support until electrolytes are corrected.  Administer 100 mg thiamine prior to initiating nutrition support and continue daily for at least 5-7 days.   3. Clinimix 5/20 @ 41 ml/hr.   4.  Physician/LIP to monitor closely and correct lytes (Phos,Mg,K+)   5.  Bag 2: As long as electrolytes WNL, advance Clinimix 5/20 to goal rate of 75 mL/hr  6.  Recommend 250 mL 20% lipids two times per week       NUTRITION ASSESSMENT   Nutrition intervention triggered for TPN rec's.  Pt readmitted d/t drainage from open abdominal wall wound.  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  Diet post surgery was Dysphagia minced & moist.  Pt is NPO & in surgery at time of visit.  NFPE data collected from 11/14/24 (will update as appropriate).   lb per bedscale indicating 15 lb wt loss in 2 weeks.  TPN initiated @ 41 ml/hr.  Pt may be at risk for REFEED SYNDROME.  Will monitor for tolerance & ability to advance TPN to goal rate of 75 ml/hr.     Nutrition Related Findings: Gluc 73, 126; Mg 2.63, phos WNL 2.6; Na 147, k+ 3.2  Wounds: Surgical Incision  Nutrition Education:  Education/Counseling not indicated   Nutrition Goals: Tolerate nutrition support at goal rate     MALNUTRITION ASSESSMENT   Acute Illness  Malnutrition Status: Moderate malnutrition  Findings of the 6 clinical characteristics of malnutrition:  Energy Intake:  50% or less of estimated energy requirements for 5 or more days  Weight Loss:  Unable to assess     Body Fat Loss:  Mild 
Nutrition Note    RECOMMENDATIONS  PO Diet: dysphagia minced and moist  ONS: Ensure Plus High Protein (2-3x/day); chocolate or vanilla  Nutrition Support:   Continue Clinimix 5/20 at 75 mL/hr with 250 mL of 20% lipids twice weekly.   Consider enteral nutrition if long term nutrition support is needed.    ASSESSMENT   Pt is nutritionally compromised and meets criteria for severe malnutrition in the context of acute illness.  Pt continues on PO diet and parenteral nutrition.  PO intake remains poor.  SLP recommended dysphagia minced and moist diet on 12/5.  Pt is receiving Ensure Plus High Protein TID, he is consuming the ONS, but not always TID.  Pt with unopened cartons at bedside, encourged ONS intake between meals. Will decrease to BID until pt consumes cartons at bedside.  Clinimix 5/20 at goal rate of 75 mL/hr continues to meet pt energy needs.  May need to consider enteral nutrition if PO intake remains inadequate.       Malnutrition Status: Severe malnutrition  Acute Illness  Findings of the 6 clinical characteristics of malnutrition:  Energy Intake:  Mild decrease in energy intake (pt now receiving PN)  Weight Loss:  No weight loss (stable over past month; unknown prior to November)     Body Fat Loss:  Moderate body fat loss Fat Overlying Ribs, Buccal region   Muscle Mass Loss:  Moderate muscle mass loss Temples (temporalis), Clavicles (pectoralis & deltoids), Hand (interosseous)  Fluid Accumulation:  No fluid accumulation     Strength:  Not Performed      NUTRITION DIAGNOSIS   Severe malnutrition related to inadequate protein-energy intake as evidenced by criteria as identified in malnutrition assessment  Increased nutrient needs related to increase demand for energy/nutrients as evidenced by wounds    Goals: PO intake 50% or greater, by next RD assessment     NUTRITION RELATED FINDINGS  Objective: thiamine; 12/6: K+ 3.4, POCG 230, Mg 2.36, K+ 2.5  Wounds: Wound Vac    CURRENT NUTRITION THERAPIES  ADULT 
Nutrition Note    RECOMMENDATIONS  PO Diet: full liquids.  CONSULT SLP FOR DIET UPGRADE RECOMMENDATIONS PRIOR TO DIET ADVANCEMENT  ONS: Ensure Plus TID  Nutrition Support: Continue Clinimix 5/20 at 75 mL/hr with 250 mL of 20% lipids twice weekly.      ASSESSMENT   Nutrition follow up.  PO diet advanced to full liquid on 12/1, pt reports only consuming Ensure this morning for breakfast.  He reports no abdominal pain.  SLP notes on 11/30 that pt has significant dysphagia per recent MBS.  Recommend SLP eval prior to diet upgrade beyond full liquids.  PN Clinimix 5/20 continues at goal rate of 75 mL/hr with 250 mL of 20% lipids twice weekly.       Malnutrition Status: Severe malnutrition  Acute Illness  Findings of the 6 clinical characteristics of malnutrition:  Energy Intake:  Mild decrease in energy intake (pt now receiving PN)  Weight Loss:  No weight loss (stable over past month; unknown prior to November)     Body Fat Loss:  Moderate body fat loss Fat Overlying Ribs, Buccal region   Muscle Mass Loss:  Moderate muscle mass loss Temples (temporalis), Clavicles (pectoralis & deltoids), Hand (interosseous)  Fluid Accumulation:  No fluid accumulation     Strength:  Not Performed      NUTRITION DIAGNOSIS   Inadequate oral intake related to altered GI function as evidenced by NPO or clear liquid status due to medical condition  Increased nutrient needs related to increase demand for energy/nutrients as evidenced by wounds    Goals: PO intake 50% or greater, Tolerate nutrition support at goal rate, by next RD assessment     NUTRITION RELATED FINDINGS  Objective: thiamine; 12/3: Na+ 147, K+ 3.4, Mg 2.52, gluc 219, phos 2.5; 12/3 LBM  Wounds: Wound Vac    CURRENT NUTRITION THERAPIES  ADULT DIET; Full Liquid  ADULT ORAL NUTRITION SUPPLEMENT; Breakfast, Lunch, Dinner; Standard High Calorie/High Protein Oral Supplement  PN-Adult Premix 5/20 - Standard Electrolytes - Central Line  ADULT ORAL NUTRITION SUPPLEMENT; Breakfast, 
Order for PICC line per Dr. Henderson. Pre procedure and timeout done with pt's RN.    Successful insertion of a DL PICC line into pt's right brachial vein. No issues gaining access or advancing guidewire/introducer/PICC line. PICC tip terminates in the SVC according to [facility] 3CG tip confirmation system. PICC was seen dropping into SVC with tip tracking technology and discernable peaked p waves were noted without negative deflection. A printout will be in pt's chart.     PICC is cut at 35 cm and out externally 0 cm. All lumens flush without resistance and draw back brisk blood return.    PICC site CDI with hemostasis maintained and a biopatch applied to site. Pt instructed to stay in bed and keep arm flat and still for 30 minutes  to promote hemostasis.     Lola NUNEZ given handoff report           
PRN dilaudid administered for 8/10 abdominal pain. Pt remains resting in chair. Friend at bedside. IS encouraged. Call light in reach, fall precautions in place.   
Patient much more relaxed, resting comfortably. VSS. Phase 1 recovery completed, will transfer back to 4T.   
Patient to PACU form OR. Awakens to voice. VSS. Abd flat/soft, large surgical dressing c/d/I. Chavez in place. NG to left nare. Patient c/o pain - will medicate as needed.   
Patient with moderate amount of  bloody sputum, suctioning frequently, NG was placed in the OR. Anesthesia at bedside, order for CXR given, patient becoming increasingly anxious, vitals are stable. 100% on RA.  
Physical Therapy    Pt upright in bed at arrival with nsg present and several visitors.  Nsg and pt report plans for a swallow study this morning and anticipating transport arrival soon.  Pt agreeable to therapy later today after testing complete.  Will follow-up as schedule permits/pt availability.    Thanks, Aruna Harrell PT, DPT [id number]      
Physical Therapy  Kevin Blair    Attempted to see pt for PT treatment. Pt declined therapy at this time due to fatigue. Just returned to bed with nursing. Will follow up with pt tomorrow as schedule permits. Thanks, Norah Herzog, PT, DPT [id number]    
Pt A&Ox 4 on RA. AM assessment and vitals completed and put into flowsheets. AM medications given with no s/s of aspiration. Patient takes pills whole in applesauce. Pt with no questions or concerns voiced to RN at this time. Fall precautions in place and call light within reach.   Vitals:    12/09/24 0736   BP: 135/70   Pulse: 71   Resp: 16   Temp: 97.9 °F (36.6 °C)   SpO2: 98%     Pt with blood transfusion at time of shift change, patient educated on the following:    Blood product transfusion process  Benefits of receiving blood product transfusion  Risks associated with receiving the transfusion  Signs and symptoms of complications associated with blood product transfusion  Vague, uneasy feeling  Onset of pain (especially at the IV site, back, or chest)  Chills  Flushing  Fever  Nausea  Dizziness  Rash  Itching  Dark or red urine  Instructed patient to notify RN immediately if any sign or symptom occurs    Scheduled lovenox held d/t anemia. Provider made aware during rounds.     
Pt alert and oriented X4. Morning assessment completed. Vitals monitored and recorded. The care plan and education has been reviewed and mutually agreed upon with the patient. At 10.06 , BS was low. PRN dextrose 10% given . 11:24 AM BS is 133 now. TPN running.   
Pt arrived in stable condition from 4463. Teaching / education initiated regarding perioperative experience, expectations, and pain management during stay. Patient verbalized understanding.   
Pt states he feels when he consumes ice chips and popsicles, it feels as if he is choking and it is, \"going down the wrong way.\" SLP eval ordered.   
Pt transferred to room 4463 at this time. A&O with no signs of distress. Report given to 4T RN. V/u and denies questions or further needs at this time.     
RN assisted patient to the restroom. Pt reports 1 black tarry stool. Patient flushed toilet before this RN could see stool. RN educated patient to not flush toilet next time so we can assess all bowel movements. Pt states understanding.   
Report called to [facility].  
Shift assessment complete at 1932.  VSS, A&Ox4, on RA, BS hypoactive.  NG tube and chandler in place.  Complained of pain, dilaudid given, see MAR.  Care plan discussed, patient mutually agrees.  Call light within reach, no further needs at this time.    Wet to dry dressing change complete.    Dilaudid given: 1936, 2328,     Alecia Saldana RN    
Shift assessment complete at 2050.  VSS, A&Ox4, BS hypoactive, on 0.5L NC (per patient request).  NG set to low continuous suction.  Chavez in place, draining.  New dressing applied to abdominal wound.  Care plan discussed, patient mutually agrees.  Call light within reach, no further needs at this time.    2359: dilaudid given for pain, see MAR    NGT removed by patient.  New NG tube placed, awaiting portable chest Xray.  Stat order in.     Alecia Saldana RN    
Shift assessment complete at 2114.  VSS, A&Ox4, on RA, BS hypoactive.  PICC to R arm giving good blood return, TPN running.  Tele remains in place.  Dressing change and cleansing to open abdominal wound complete, green thin drainage present.  Denies pain at this time.  Care plan discussed, patient mutually agrees.  Call light within reach, no further needs at this time.    Alecia Saldana RN    
Shift assessment complete, A&O x4, VSS, passing gas, and small BM, during this shift. Chavez and NG tube in place draining, wound vac to abd incision draining very small output. Cath flow to purple lumen due to infiltration, lumen clear and IV ABX infusing through it. TPN infusing, gave Dilaudid for pain.   The care plan and education has been reviewed and mutually agreed upon with the patient.     
Shift assessment complete, A&Ox4, VSS, NG tube and chandler in place draining, wound vac with little output  
Shift assessment completed, denied for any pain, incontinent care done, wound dressing was changed morning CDI, encourage oral fluid intake, denied for other needs at this time, will continue to monitored.   Satish Cabrera RN    
Shift assessment completed, pt is alert and oriented, VSS, fall precautions in place, call light within reach, denies any other needs at this time, see flowsheets and MAR, will monitor pt. The care plan and education has been reviewed and mutually agreed upon with the patient.     
Shift assessment completed, pt is alert and oriented, VSS, fall precautions in place, call light within reach, denies any pain or other needs at this time, see flowsheets and MAR, will monitor pt. The care plan and education has been reviewed and mutually agreed upon with the patient.     
Shift assessment completed, pt is alert and oriented, VSS, fall precautions in place, call light within reach, denies any pain, see flowsheets and MAR, will monitor pt. The care plan and education has been reviewed and mutually agreed upon with the patient.     
Shift assessment completed. Neuro WNL. Denies any pain at this time. AM meds administered per MAR. The care plan and education has been reviewed and mutually agreed upon with the patient. Standard safety measures in place.    
Shift assessment completed. Neuro WNL. Denies any pain at this time. AM meds administered per MAR. The care plan and education has been reviewed and mutually agreed upon with the patient. Standard safety measures in place.    8:12 AM  Started blood transfusion at this time. Verified with second RN. Stayed in room to observe pt for the initial 15 mins. Pt educated to report any s/s suspected to be transfusion reaction. Pt verbalized understanding.     10:54 AM  Blood transfusion stopped at this time. Pt tolerated well. VSS.   
Shift assessment completed. Neuro WNL. Reports pain 5/10 at this time. AM meds administered per MAR. NGT in place, to continuous low wall suction. Pt remains NPO. NICHELLE surgical incision; dressing remains in place; no drainage noted. The care plan and education has been reviewed and mutually agreed upon with the patient. Standard safety measures in place.    
Shift assessment completed. VSS. AM medications administered as ordered. Alert and oriented. NGT to continuous wall suction. Chavez cleaned and draining.       0915: Chavez to stay in for about 1-2 more days to promote healing, per Urology.   1100: Lab contacted to draw blood cultures.    1130: Dressing to abd changed w/ surgery. Wet to dry, abd, tape. Gauze over retention sutures to remain in place. Pt tolerated well, PRN dilaudid given for pain. Effexor and lisinopril held at this time.   
Shift assessment completed. VSS. AM medications administered as ordered. Alert and oriented. Potassium replaced. Water bolus oral given.        0945: Pt to Radiology for Barium Swallow eval.     1030: Pt returned from Radiology.     1515: Oral contrast started.    1545: Oral contrast complete, CT aware.   
Shift assessment completed. VSS. Alert and Oriented. Dressing changed to abd. Pt to go for surgery     1415: Pt returned from PACU. VSS. NGT to continuous suction. Chavez catheter in place. Pt sleepy, easy to arouse. Family friend, Kale, at bedside.   
Speech Language Pathology    Kevin Blair  1935    Pt initiated on clear liquids this date with NG tube still in place. Pt found with tray in front of him stating he can consume the italian ice/ popsicles and ice chips but the thin liquids including the broth move too quickly and he gets choked up with NG in place. Pt declined any trials of soft solids (jello) or thins at this time. Pt aware of need for safe swallow strategies and is only consuming foods he feels like he can tolerate. Pt anticipating removal of NG tube. Will continue to f/u with pt for dysphagia intervention as pt is well known to speech therapy department with significant dysphagia per recent MBS completed (see report for details). Pt states he understands that he is at a high risk for aspiration with all textures and wants to continue on an oral diet with ongoing SLP intervention. Recommend strict aspiration precautions.     Thanks,  Marylu Braun M.A. CCC-SLP #[id number] 11/30/2024 10:23 AM  Speech-Language Pathologist    (Did not bill < 8 minutes spent with pt)   
Speech Language Pathology  Attempt   Kevin Blair   1935     Attempted to see pt for dysphagia therapy follow-up. Pt off floor for endoscopy at time of attempt. Will re-attempt to follow-up as therapy schedule allows.    Thanks,  Vonda Raza MA CCC-SLP #[id number]  Speech Language Pathologist    
Teaching / education initiated regarding perioperative experience, expectations, and pain management during stay. Patient verbalized understanding.    
Upon morning assessment, noted NGT measuring at 55cm. Per chart, initial external measurement at 63cm. Surgery paged.     1218: Per Dr. Sheridan, NGT ok to remain in place at 55cm.  
Urology Progress Note  [facility]    Provider: MARIELENA Tijerina CNP  Patient ID:  Admission Date: 2024 Name: Kevin Blair  OR Date: 2024 MRN: [id number]   Patient Location: [id number] : 1935  Attending: Татьяна Schmitz MD Date of Service: 2024  PCP: Blane Callahan MD     Diagnoses:  Complex chandler    Assessment/Plan:  89 yo male with hx of urolift outlet surgery in   Complex chandler in the OR yesterday- false passage noted    Recc  As a result of any trauma patient may have had with chandler insertion, leave chandler at least 48 more hours to allow his urethra to heal patent. Prostate surgery as in , should still have open prostatic fossa. Future voiding trial prior to discharge.     The patient had a chance to ask questions which were answered. he understands the above plan.    Subjective:   Kevin Blair is a 89 y.o. male. He was seen and examined this morning. Today we discussed VT.      Objective:   Vitals:  Vitals:    24 0900   BP: 98/64   Pulse: 96   Resp: 18   Temp: 97.6 °F (36.4 °C)   SpO2: 99%       Intake/Output Summary (Last 24 hours) at 2024 0915  Last data filed at 2024 0909  Gross per 24 hour   Intake 1650 ml   Output 825 ml   Net 825 ml     Physical Exam:  Gen: Alert and oriented x3, no acute distress  : CYU inf chandler  Skin: warmand well perfused, no rashes noted on the face, or arms.     Labs:  Lab Results   Component Value Date    WBC 10.9 2024    HGB 10.0 (L) 2024    HCT 29.7 (L) 2024    MCV 94.1 2024     (H) 2024     Lab Results   Component Value Date    CREATININE 1.0 2024    BUN 17 2024     2024    K 3.4 (L) 2024     2024    CO2 29 2024       MARIELENA Tijerina CNP   2024        
       Objective:    Good UOP  BP (!) 108/58   Pulse 77   Temp 97.6 °F (36.4 °C) (Oral)   Resp 16   Ht 1.74 m (5' 8.5\")   Wt 49.1 kg (108 lb 3.9 oz)   SpO2 99%   BMI 16.22 kg/m²     Wt Readings from Last 3 Encounters:   11/24/24 49.1 kg (108 lb 3.9 oz)   11/22/24 53.5 kg (118 lb)   11/13/24 59.9 kg (132 lb)       BP Readings from Last 3 Encounters:   11/30/24 (!) 108/58   11/22/24 116/68   11/14/24 123/65     HEENT-has NGT  Chest- clear  Heart-regular  Abd-soft  Ext- no edema    Labs  Hemoglobin   Date Value Ref Range Status   11/30/2024 8.2 (L) 13.5 - 17.5 g/dL Final     Hematocrit   Date Value Ref Range Status   11/30/2024 24.2 (L) 40.5 - 52.5 % Final     WBC   Date Value Ref Range Status   11/30/2024 8.8 4.0 - 11.0 K/uL Final     Platelets   Date Value Ref Range Status   11/30/2024 293 135 - 450 K/uL Final     Lab Results   Component Value Date    CREATININE 1.1 11/30/2024    BUN 21 (H) 11/30/2024     (H) 11/30/2024    K 3.4 (L) 11/30/2024     (H) 11/30/2024    CO2 34 (H) 11/30/2024       Assessment/Plan:  1.  Hypernatremia-receiving TPN.  Na higher today.  Probably due to decreased free water intake+free water NGT loss.  Not polyuric.  Goal serum sodium in the next 24 hours around 141.  Increase free water intake, can start D5W if PO intake remains poor. Follow-up serum na this afternoon.  2.  Hypokalemia-in the setting of NG tube to suction plus poor p.o. intake.  Replace IV.  TPN.  Discussed with Pharmacy.   3.  Hypophosphatemia-probably from decreased p.o. intake.  Better.   4.  Hypermagnesemia-follow  5.  Anemia-follow hemoglobin  6.  Status post colon resection.  Currently has NG tube and on TPN.  Surgery following.   7.   Hypercalcemia-adjust Ca in TPN.  Better.     Rodrigo Bartlett DO    
  * 146* 148*   K 3.1* 3.3* 3.2*   * 109 110   CO2 32 31 33*   BUN 20 20 19   CREATININE 1.0 0.9 0.9   GLUCOSE 191* 158* 86     Hepatic:   No results for input(s): \"AST\", \"ALT\", \"BILITOT\", \"ALKPHOS\" in the last 72 hours.    Invalid input(s): \"ALB\"    XR CHEST PORTABLE   Final Result   Satisfactory NG tube and right upper extremity PICC line placement.         XR ABDOMEN FOR NG/OG/NE TUBE PLACEMENT   Final Result   Enteric tube tip is within the stomach with side port just below the   gastroesophageal junction.         CT ABDOMEN PELVIS W IV CONTRAST Additional Contrast? None   Final Result   1.  Postoperative changes of laparotomy and partial small-bowel resection.   No bowel dilatation or drainable fluid collection identified.      2.  Extensive colonic diverticulosis.      3.  Possible right inguinal testicle.      4.  Additional findings, as above.               Assessment & Plan:       Enterocutaneous fistula; presented with worsening open wound drainage  S/p ex lap and small bowel resection for volvulus and ischemic bowel on 11/7/24 by general surgery.   General surgery on board;s/p ex lap/small bowel resection on 11/25/2024.   currently on TPN.  Continue NG tube decompression.  Continue wound care to open midline incision.    Continue Zosyn. Monitor for drainage from wound.   Wound VAC in place    Hypernatremia; likely 2/2 poor water intake.  Serum sodium 147 this morning  Patient currently on TPN; started on D5W at 75 mL/h.  Sodium up to 149 potassium down to 3.1  Nephrology has discussed with pharmacy to adjust TPN  Also receiving some additional IV potassium replacement    Hypokalemia; monitor potassium closely and replace as needed    Anemia; iron studies on 11/8/2024 c/w  AOCD/iron deficiency  Started on iron supplement.  Monitor CBC closely      Hypertension; continue current regimen monitor BP closely    Hyperlipidemia; continue statins      DVT PPX: Lovenox  Code:Full Code    Disposition: 
  Admission weight: 52 kg (114 lb 10.2 oz)  Ideal Body Weight (IBW): 157 lbs  (71 kg)        BMI: 17.4      COMPARATIVE STANDARDS  Total Energy Requirements (kcals/day): 1581 - 1844     Protein (g):  63 - 79       Fluid (mL/day):  1581 - 1844    EDUCATION  Education/Counseling not indicated     The patient will be monitored per nutrition standards of care. Consult dietitian if additional nutrition interventions are needed prior to RD reassessment.     NAYELY STOREY, MICHELLE, LD    Contact: [phone]  
138/58   Pulse 86   Temp 98 °F (36.7 °C) (Oral)   Resp 16   Ht 1.74 m (5' 8.5\")   Wt 52.7 kg (116 lb 2.9 oz)   SpO2 97%   BMI 17.41 kg/m²     Wt Readings from Last 3 Encounters:   12/06/24 52.7 kg (116 lb 2.9 oz)   11/22/24 53.5 kg (118 lb)   11/13/24 59.9 kg (132 lb)       BP Readings from Last 3 Encounters:   12/06/24 (!) 138/58   11/22/24 116/68   11/14/24 123/65     HEENT-has NGT  Chest- clear  Heart-regular  Abd-soft  Ext- no edema    Labs  Hemoglobin   Date Value Ref Range Status   12/04/2024 7.9 (L) 13.5 - 17.5 g/dL Final     Hematocrit   Date Value Ref Range Status   12/04/2024 24.0 (L) 40.5 - 52.5 % Final     WBC   Date Value Ref Range Status   12/04/2024 7.0 4.0 - 11.0 K/uL Final     Platelets   Date Value Ref Range Status   12/04/2024 205 135 - 450 K/uL Final     Lab Results   Component Value Date    CREATININE 0.9 12/06/2024    BUN 21 (H) 12/06/2024     12/06/2024    K 3.4 (L) 12/06/2024     12/06/2024    CO2 31 12/06/2024       Assessment/Plan:  1.  Hypernatremia-receiving TPN.  Na higher today.  Probably due to decreased free water intake+free water NGT loss.  Not polyuric. slowly Improving.   -continue FW with nutrition.    -serum Na normalized with free water boluses. Repeat sNa this am.  2.  Hypokalemia-in the setting of NG tube to suction plus poor p.o. intake.  S/p TPN.  Discussed with Pharmacy.   3.  Hypophosphatemia-probably from decreased p.o. intake.  Better with improved nutrition.  4.  Hypermagnesemia-follow  5.  Anemia-follow hemoglobin  6.  Status post colon resection.   Surgery following.   7.   Hypercalcemia-adjust Ca in TPN.  Better. Outpatient follow-up and check pth, vitamin d.    Rodrigo Bartlett DO    
Medications  potassium phosphate 16 mmol in sodium chloride 0.9 % 250 mL IVPB, Once  PN-Adult Premix 5/20 - Standard Electrolytes - Central Line, Continuous TPN  thiamine (B-1) 100 mg in sodium chloride 0.9 % 100 mL IVPB, Daily  enoxaparin (LOVENOX) injection 40 mg, Daily  HYDROmorphone (DILAUDID) injection 0.25 mg, Q3H PRN   Or  HYDROmorphone (DILAUDID) injection 0.5 mg, Q3H PRN  insulin lispro (HUMALOG,ADMELOG) injection vial 0-4 Units, Q4H  dextrose bolus 10% 125 mL, PRN   Or  dextrose bolus 10% 250 mL, PRN  glucagon injection 1 mg, PRN  dextrose 10 % infusion, Continuous PRN  sodium chloride flush 0.9 % injection 5-40 mL, 2 times per day  sodium chloride flush 0.9 % injection 5-40 mL, PRN  0.9 % sodium chloride infusion, PRN  sodium chloride flush 0.9 % injection 5-40 mL, 2 times per day  sodium chloride flush 0.9 % injection 5-40 mL, PRN  0.9 % sodium chloride infusion, PRN  lisinopril (PRINIVIL;ZESTRIL) tablet 20 mg, Daily  venlafaxine (EFFEXOR XR) extended release capsule 150 mg, Daily  sodium chloride flush 0.9 % injection 5-40 mL, 2 times per day  sodium chloride flush 0.9 % injection 5-40 mL, PRN  0.9 % sodium chloride infusion, PRN  potassium chloride (KLOR-CON M) extended release tablet 40 mEq, PRN   Or  potassium bicarb-citric acid (EFFER-K) effervescent tablet 40 mEq, PRN   Or  potassium chloride 10 mEq/100 mL IVPB (Peripheral Line), PRN  magnesium sulfate 2000 mg in 50 mL IVPB premix, PRN  ondansetron (ZOFRAN-ODT) disintegrating tablet 4 mg, Q8H PRN   Or  ondansetron (ZOFRAN) injection 4 mg, Q6H PRN  polyethylene glycol (GLYCOLAX) packet 17 g, Daily PRN  acetaminophen (TYLENOL) tablet 650 mg, Q6H PRN   Or  acetaminophen (TYLENOL) suppository 650 mg, Q6H PRN  melatonin tablet 3 mg, Nightly  piperacillin-tazobactam (ZOSYN) 3375 mg in dextrose 50 mL IVPB (premix), Q8H        Objective:  BP 98/64   Pulse 96   Temp 97.6 °F (36.4 °C) (Oral)   Resp 18   Ht 1.74 m (5' 8.5\")   Wt 49.1 kg (108 lb 3.9 oz)  
PRN  sodium chloride flush 0.9 % injection 5-40 mL, 2 times per day  sodium chloride flush 0.9 % injection 5-40 mL, PRN  0.9 % sodium chloride infusion, PRN  lisinopril (PRINIVIL;ZESTRIL) tablet 20 mg, Daily  venlafaxine (EFFEXOR XR) extended release capsule 150 mg, Daily  sodium chloride flush 0.9 % injection 5-40 mL, 2 times per day  sodium chloride flush 0.9 % injection 5-40 mL, PRN  magnesium sulfate 2000 mg in 50 mL IVPB premix, PRN  ondansetron (ZOFRAN-ODT) disintegrating tablet 4 mg, Q8H PRN   Or  ondansetron (ZOFRAN) injection 4 mg, Q6H PRN  polyethylene glycol (GLYCOLAX) packet 17 g, Daily PRN  acetaminophen (TYLENOL) tablet 650 mg, Q6H PRN   Or  acetaminophen (TYLENOL) suppository 650 mg, Q6H PRN  melatonin tablet 3 mg, Nightly        Objective:  BP (!) 135/56   Pulse 74   Temp 97.8 °F (36.6 °C) (Oral)   Resp 16   Ht 1.74 m (5' 8.5\")   Wt 52.7 kg (116 lb 2.9 oz)   SpO2 97%   BMI 17.41 kg/m²     Intake/Output Summary (Last 24 hours) at 12/9/2024 0713  Last data filed at 12/9/2024 0653  Gross per 24 hour   Intake 600 ml   Output 3200 ml   Net -2600 ml      Wt Readings from Last 3 Encounters:   12/06/24 52.7 kg (116 lb 2.9 oz)   11/22/24 53.5 kg (118 lb)   11/13/24 59.9 kg (132 lb)       General appearance:  Appears comfortable, looks a little depressed today   Eyes: Sclera clear. Pupils equal.  ENT: Moist oral mucosa. Trachea midline, no adenopathy.  Cardiovascular: Regular rhythm, normal S1, S2. No murmur. No edema in lower extremities  Respiratory: Not using accessory muscles. Good inspiratory effort. Clear to auscultation bilaterally, no wheeze or crackles.   GI: Abdomen soft and flat with sluggish bowel sounds.  Abd dressing removed.  No drainage or odor noted.    Musculoskeletal: No cyanosis in digits, neck supple  Neurology: CN 2-12 grossly intact. No speech or motor deficits  Psych: Normal affect. Alert and oriented in time, place and person  Skin: Warm, dry, normal turgor    Labs and 
midnight  PPI IV BID  Monitor hgb  EGD tomorrow    Discussed with Dr. Luz Maria Yañez PA-C  [facility]    
obviate the need for this procedure.  He agrees.     PLAN   :   Diet as tolerated  Defer PEG for now since eating some and 88 yo man.    GI will sign off.  Please call with questions.      Joshua Grant MD  [facility]  12/8/2024   
release capsule 150 mg, Daily  sodium chloride flush 0.9 % injection 5-40 mL, 2 times per day  sodium chloride flush 0.9 % injection 5-40 mL, PRN  magnesium sulfate 2000 mg in 50 mL IVPB premix, PRN  ondansetron (ZOFRAN-ODT) disintegrating tablet 4 mg, Q8H PRN   Or  ondansetron (ZOFRAN) injection 4 mg, Q6H PRN  polyethylene glycol (GLYCOLAX) packet 17 g, Daily PRN  acetaminophen (TYLENOL) tablet 650 mg, Q6H PRN   Or  acetaminophen (TYLENOL) suppository 650 mg, Q6H PRN  melatonin tablet 3 mg, Nightly        Objective:  /66   Pulse 77   Temp 97.3 °F (36.3 °C) (Oral)   Resp 16   Ht 1.74 m (5' 8.5\")   Wt 53.7 kg (118 lb 6.2 oz)   SpO2 97%   BMI 17.74 kg/m²     Intake/Output Summary (Last 24 hours) at 12/4/2024 0745  Last data filed at 12/4/2024 0329  Gross per 24 hour   Intake 2025.78 ml   Output 1300 ml   Net 725.78 ml      Wt Readings from Last 3 Encounters:   12/03/24 53.7 kg (118 lb 6.2 oz)   11/22/24 53.5 kg (118 lb)   11/13/24 59.9 kg (132 lb)       General appearance:  Appears comfortable, looks emaciated and chronically ill   Eyes: Sclera clear. Pupils equal.  ENT: Moist oral mucosa. Trachea midline, no adenopathy.  Cardiovascular: Regular rhythm, normal S1, S2. No murmur. No edema in lower extremities  Respiratory: Not using accessory muscles. Good inspiratory effort. Clear to auscultation bilaterally, no wheeze or crackles.   GI: Abdomen soft and flat with sluggish bowel sounds.  Wound vac in place to large abd incision   Musculoskeletal: No cyanosis in digits, neck supple  Neurology: CN 2-12 grossly intact. No speech or motor deficits  Psych: Normal affect. Alert and oriented in time, place and person  Skin: Warm, dry, normal turgor    Labs and Tests:  CBC:   Recent Labs     12/02/24  0450 12/02/24  1225 12/03/24  0835   WBC 5.8  --  8.0   HGB 6.9* 8.2* 8.3*     --  218     BMP:    Recent Labs     12/02/24  0450 12/03/24  0625   * 147*   K 3.5 3.4*   * 111*   CO2 30 31   BUN 20 
this time, this patient demonstrates the endurance and safety to discharge home with home health PT and a follow up treatment frequency of 2-3x/wk.  Please see assessment section for further patient specific details. If patient discharges prior to next session this note will serve as a discharge summary.  Please see below for the latest assessment towards goals.       HOME HEALTH CARE: LEVEL 3 SAFETY     - Initial home health evaluation to occur within 24-48 hours, in patient home   - Therapy evaluations in home within 24-48 hours of discharge; including DME and home safety   - Frontload therapy 5 days, then 3x a week   - Therapy to evaluate if patient has Home Health Aide needs for personal care   -  evaluation within 24-48 hours, includes evaluation of resources and insurance to determine [place], [place], LTC, and Medicaid options       DME Required For Discharge: rolling walker  Precautions/Restrictions: high fall risk  Weight Bearing Restrictions: no restrictions  [] Right Upper Extremity  [] Left Upper Extremity [] Right Lower Extremity  [] Left Lower Extremity     Required Braces/Orthotics:  abdominal binder    [] Right  [] Left  Positional Restrictions:no positional restrictions    Pre-Admission Information   Lives With: alone                     Type of Home: house  Home Layout: one level with laundry in the basement; able to live on main level  Home Access:  13 step to enter with handrail.  Handrails are located on L side. Pt states that stairs are on the back on the house, which is the entrance he typically uses. No stairs going in the front of the house.  Bathroom Layout: tub/shower unit  Bathroom Equipment: grab bars in shower  Toilet Height: standard height  Home Equipment: single point cane  Transfer Assistance: Independent without use of device  Ambulation Assistance:Independent without use of device  ADL Assistance: independent with all ADL's  IADL Assistance: independent with homemaking 
(ZOSYN) 3375 mg in dextrose 50 mL IVPB (premix), Q8H        Objective:  /71   Pulse (!) 101   Temp 98.2 °F (36.8 °C) (Oral)   Resp 18   Ht 1.74 m (5' 8.5\")   Wt 49.1 kg (108 lb 3.9 oz)   SpO2 90% Comment: cold hands, erratic readings  BMI 16.22 kg/m²     Intake/Output Summary (Last 24 hours) at 11/27/2024 0853  Last data filed at 11/27/2024 0552  Gross per 24 hour   Intake --   Output 1475 ml   Net -1475 ml      Wt Readings from Last 3 Encounters:   11/24/24 49.1 kg (108 lb 3.9 oz)   11/22/24 53.5 kg (118 lb)   11/13/24 59.9 kg (132 lb)       Physical Examination:   General appearance:  No apparent distress, appears stated age and cooperative.  Cardiovascular: Regular rate and rhythm, normal S1, S2. No murmur.   Respiratory:Clear to auscultation bilaterally, no wheeze or crackles.   GI: Abdomen  not distended, Dressing in place  Musculoskeletal: No cyanosis in digits.  No BLE edema present  Neurology: CN 2-12 grossly intact. No speech or motor deficits      Labs and Tests:  CBC:   Recent Labs     11/25/24 0456 11/26/24  0409 11/27/24  0437   WBC 5.6 10.9 9.6   HGB 9.1* 10.0* 9.3*   * 523* 424     BMP:    Recent Labs     11/25/24 0455 11/26/24 0409 11/27/24  0437   * 145 147*   K 3.2* 3.4* 3.5    110 110   CO2 32 29 32   BUN 13 17 21*   CREATININE 1.0 1.0 0.9   GLUCOSE 126* 177* 190*     Hepatic:   No results for input(s): \"AST\", \"ALT\", \"BILITOT\", \"ALKPHOS\" in the last 72 hours.    Invalid input(s): \"ALB\"    XR CHEST PORTABLE   Final Result   Satisfactory NG tube and right upper extremity PICC line placement.         XR ABDOMEN FOR NG/OG/NE TUBE PLACEMENT   Final Result   Enteric tube tip is within the stomach with side port just below the   gastroesophageal junction.         CT ABDOMEN PELVIS W IV CONTRAST Additional Contrast? None   Final Result   1.  Postoperative changes of laparotomy and partial small-bowel resection.   No bowel dilatation or drainable fluid collection 
8.3*     --  218     BMP:    Recent Labs     12/01/24  0426 12/02/24  0450 12/03/24  0625   * 146* 147*   K 3.8 3.5 3.4*   * 112* 111*   CO2 31 30 31   BUN 23* 20 19   CREATININE 0.9 0.9 0.9   GLUCOSE 201* 231* 219*     Hepatic: No results for input(s): \"AST\", \"ALT\", \"BILITOT\", \"ALKPHOS\" in the last 72 hours.    Invalid input(s): \"ALB\"        Problem List  Principal Problem:    Open abdominal wall wound, initial encounter  Active Problems:    Severe malnutrition (HCC)    Postoperative surgical complication involving skin associated with non-dermatologic procedure    Enterocutaneous fistula  Resolved Problems:    * No resolved hospital problems. 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  Then had exp lap small bowel resection on 11/25/24 .  Currently on TPN and tolerating full liquids.  No longer on antibiotic therapy   Chronic severe dysphagia:  has declined PEG.  He is aware of the risk of aspiration.    Hypernatremia:  needs free water.   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  Hgb stable at 8.3  HTN:  in range, resume ace in the am   Pt will need ECF       Diet: ADULT DIET; Full Liquid  ADULT ORAL NUTRITION SUPPLEMENT; Breakfast, Lunch, Dinner; Standard High Calorie/High Protein Oral Supplement  PN-Adult Premix 5/20 - Standard Electrolytes - Central Line  ADULT ORAL NUTRITION SUPPLEMENT; Breakfast, Lunch, Dinner, HS Snack; Standard High Calorie/High Protein Oral Supplement  PN-Adult Premix 5/20 - Standard Electrolytes - Central Line  Code:Full Code  DVT PPX shane Xiao, MARIELENA - CNP   12/3/2024 12:31 PM   
MD YOLANDA   3 mg at 11/23/24 2142    piperacillin-tazobactam (ZOSYN) 3375 mg in dextrose 50 mL IVPB (premix)  3,375 mg IntraVENous Q8H Nghia Sheridan MD   Stopped at 11/26/24 0914     No Known Allergies  Principal Problem:    Open abdominal wall wound, initial encounter  Active Problems:    Moderate malnutrition (HCC)    Postoperative surgical complication involving skin associated with non-dermatologic procedure  Resolved Problems:    * No resolved hospital problems. *    Blood pressure 103/69, pulse 98, temperature 97.6 °F (36.4 °C), temperature source Oral, resp. rate 18, height 1.74 m (5' 8.5\"), weight 49.1 kg (108 lb 3.9 oz), SpO2 96%.    Subjective:  Symptoms:  Improved.    Diet:  NPO.    Activity level: Impaired due to pain.    Pain:  He complains of pain that is moderate.      Objective:  General Appearance:  Comfortable.    Vital signs: (most recent): Blood pressure 103/69, pulse 98, temperature 97.6 °F (36.4 °C), temperature source Oral, resp. rate 18, height 1.74 m (5' 8.5\"), weight 49.1 kg (108 lb 3.9 oz), SpO2 96%.    Output: Producing urine and no stool output.    Lungs:  Normal effort and normal respiratory rate.    Abdomen: Abdomen is soft and non-distended.  (Wound clean  Fascia intact).    Neurological: Patient is alert and oriented to person, place and time.    Skin:  Warm and dry.      Assessment & Plan 89-year-old male who is postop day #1 status post lysis of adhesions and small bowel resection.  Stable surgically.  Continue NG tube decompression.  Continue wound care to open midline incision.  Appreciate  input.  Leave Chavez 2-3 more days because of difficulty inserting Chavez catheter.    Nghia Sheridan MD  11/26/2024    
Repeat labs in am. No obvious signs of bleeding      5. Hypertension; continue current regimen monitor BP closely    6. Complicated chandler place- chandler removed Saturday and voiding well.    DVT PPX: Lovenox  Code:Full Code    Eve Santos PA-C   12/2/2024 10:08 AM       
Started on iron supplement.  Monitor CBC closely      6. Hypertension; continue current regimen monitor BP closely    7. Complicated chandler place-     DVT PPX: Lovenox  Code:Full Code    Eve Santos PA-C   11/30/2024 10:58 AM       
negative except  GENITOURINARY:  negative  HEMATOLOGIC/LYMPHATIC:  negative  NEUROLOGICAL:  negative  SKIN: negative      OBJECTIVE:  VITALS:  /75   Pulse 77   Temp 97.5 °F (36.4 °C) (Oral)   Resp 16   Ht 1.74 m (5' 8.5\")   Wt 49.1 kg (108 lb 3.9 oz)   SpO2 92%   BMI 16.22 kg/m²     INTAKE/OUTPUT:    I/O last 3 completed shifts:  In: 610 [I.V.:610]  Out: 525 [Urine:525]  I/O this shift:  In: -   Out: 350 [Urine:350]    CONSTITUTIONAL:  awake and alert  LUNGS:  clear to auscultation  ABDOMEN:  normal bowel sounds, soft, non-distended, non-tender, wound , small amount of drainage today         Data:  CBC:   Recent Labs     11/22/24 1821 11/23/24 0459 11/24/24  0935   WBC 9.2 6.0 6.0   HGB 9.7* 8.9* 9.2*   HCT 28.5* 26.8* 27.7*   * 500* 544*     BMP:    Recent Labs     11/22/24 1821 11/23/24  0459 11/24/24  0935    141 147*   K 4.2 4.0 4.0    105 107   CO2 30 30 26   BUN 15 13 19   CREATININE 0.9 1.0 1.1   GLUCOSE 111* 106* 58*     Hepatic:   Recent Labs     11/22/24 1821 11/23/24  0459   AST 18 18   ALT 18 13   BILITOT 0.3 0.4   ALKPHOS 84 74     Mag:      Recent Labs     11/22/24 1821 11/24/24  0935   MG 2.48* 2.42*      Phos:     Recent Labs     11/24/24  0935   PHOS 3.6      INR: No results for input(s): \"INR\" in the last 72 hours.    Radiology Review:  None      ASSESSMENT AND PLAN:  Enterocutaneous fistula  Dressings done, monitor outputs  PIC in, TPN starting  OK for po ice chips  Monitor / correct lytes    Chapo Aquino MD       
thin liquid textures appeared to be due to pharyngeal pooling with delayed swallow onset, decreased anterior hyoid movement, reduced epiglottic inversion, decreased laryngeal elevation and decreased laryngeal vestibule closure. Material entered the airway, remained above the vocal folds and was not ejected from the airway. Strategies of head rotation, hard swallow and double swallows were not effective. Overall pt demonstrates moderate risk for ongoing poor nutrition/hydration due to severity of oropharyngeal dysphagia. He demonstrates mild/moderate risk for aspiration of various textures due to impaired airway closure, pharyngeal residue and decreased sensation. Would recommend continuation of Dysphagia II Minced and Moist with close monitoring and precautions in conjunction with speech therapy.     History/Prior Level of Function:   Living Status: Lives alone  Prior Dysphagia History: Per chart, pt was most recently seen at this facility and a MBS was completed indicating severe oropharyngeal dysphagia (see above). Pt opted to continue eating PO with ongoing dysphagia intervention. A Dysphagia II Minced and Moist with Thin Liquids diet was recommended with close monitoring and precautions.   Reason for referral: SLP evaluation orders received due to prior hx of dysphagia  and pt/family reports of trouble swallowing .    Evaluation/Treatment     Dysphagia Treatment:    Assessment of Texture Tolerance:  Diet level prior to treatment: Full liquids   Tolerance of Current Diet Level: Pt reported no difficulty with intake of liquids, did not appear to be aware of current diet     Impressions: Pt was positioned upright in bed, awake and alert. Currently on room air. Reclined in bed with daughter present. Pt reported fatigue and declined to participate in pharyngeal strength exercises this date. Re-educated pt on exercises to complete; effortful swallow and bc maneuver. Extensive skilled education provided to pt and 
Interventions.     Functional Outcomes  AM-PAC Inpatient Daily Activity Raw Score: 19      Cognition  WFL  Orientation:    alert and oriented x 4  Command Following:   WFL     Education  Barriers To Learning: none  Patient Education: patient educated on goals, OT role and benefits, plan of care, ADL adaptive strategies, proper use of assistive device/equipment, energy conservation, disease specific education, discharge recommendations  Learning Assessment:  patient verbalizes and demonstrates understanding    Assessment  Activity Tolerance: Fair  Impairments Requiring Therapeutic Intervention: decreased functional mobility, decreased ADL status, decreased endurance, decreased balance, decreased IADL, increased pain  Prognosis: good  Clinical Assessment: Pt presents with the above deficits which are below baseline and would benefit from continued skilled OT services.  Pt tolerated OT tx this date with no c/o pain, but reports fatigue/weakness. Pt requires SBA for functional mobility with RW, SBA/CGA for functional transfers, ModA for LB dressing. Pt primarily limited global deconditioning. Continue with present POC.   Safety Interventions: patient left in chair, chair alarm in place, call light within reach, telesitter in use, and nurse notified    Plan  Frequency: 3-5 x/per week  Current Treatment Recommendations: balance training, functional mobility training, transfer training, endurance training, ADL/self-care training, IADL training, pain management, and positioning    Goals  Patient Goals: to go home   Short Term Goals:  Time Frame: discharge  Patient will complete upper body ADL at modified independent   Patient will complete lower body ADL at modified independent   Patient will complete toileting at modified independent   Patient will complete functional transfers at modified independent   Patient will complete functional mobility at modified independent   Patient will complete bed mobility at modified 
decreased IADL, increased pain  Prognosis: good  Clinical Assessment: Pt presents with the above deficits which are below baseline. Pt currently requires SBA for mobility with FWW and min A for LB ADLs. Pt from home alone and has prn assist available. Pt would continue to benefit from skilled OT services in order to maximize safety in order to return to PLOF.    Safety Interventions: patient left in chair, chair alarm in place, call light within reach, gait belt, telesitter in use, and nurse notified    Plan  Frequency: 3-5 x/per week  Current Treatment Recommendations: balance training, functional mobility training, transfer training, endurance training, ADL/self-care training, IADL training, pain management, and positioning    Goals  Patient Goals: to go home   Short Term Goals:  Time Frame: discharge  Patient will complete upper body ADL at modified independent   Patient will complete lower body ADL at modified independent   Patient will complete toileting at modified independent   Patient will complete functional transfers at modified independent   Patient will complete functional mobility at modified independent   Patient will complete bed mobility at modified independent     Above goals reviewed on 12/2/2024.  All goals are ongoing at this time unless indicated above.       Therapy Session Time     Individual Group Co-treatment   Time In 1054     Time Out 1121     Minutes 27        Timed Code Treatment Minutes:   27 Minutes  Total Treatment Minutes:  27 Minutes     Electronically Signed By: Lynn DOYLE OTR/L [id number]       
dextrose bolus, glucagon (rDNA), dextrose, sodium chloride flush, sodium chloride flush, sodium chloride, sodium chloride flush, magnesium sulfate, ondansetron **OR** ondansetron, polyethylene glycol, acetaminophen **OR** acetaminophen      Assessment:  89 y.o. male admitted with   1. Postoperative surgical complication involving skin associated with non-dermatologic procedure, unspecified complication    2. Inadequate oral intake    3. Goals of care, counseling/discussion    4. 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 Pain controlled, incisional tenderness only on exam, wound bed is generally clean with only scant fibrinous debris, no nausea or vomiting, appetite remains poor, passing flatus and stool, vitals stable, hemoglobin decreased without overt signs of bleeding--transfusion in process; continued supportive care, local wound care--continue wound VAC  2. Hold at full liquid diet with supplements as tolerated; monitor bowel function  3. Continue TPN--anticipate will stop prior to discharge; monitor and correct electrolytes  4. Activity as tolerated, ambulate TID, up to chair for all meals--PT/OT following  5. Pulmonary toilet, incentive spirometry  6. PRN analgesics and antiemetics--minimizing narcotics as tolerated  7. DVT prophylaxis with  Lovenox and SCD's  8. Management of medical comorbid etiologies per primary team and consulting services  9. Disposition: Discharge planning; anticipate ECF/SNF at discharge when appetite is adequate, discussed with     EDUCATION:  Educated patient on plan of care and disease process--all questions 
patient at risk for falls, and telesitter in use    Plan  Frequency: 3-5 x/per week  Current Treatment Recommendations: strengthening, balance training, functional mobility training, transfer training, gait training, stair training, endurance training, pain management, home exercise program, and safety education    Goals  Patient Goals: To return home   Short Term Goals:  Time Frame: Upon discharge   Patient will complete bed mobility at modified independent   Patient will complete transfers at [facility]   Patient will ambulate 300 ft with use of LRAD at [facility]  Patient will ascend/descend 13 stairs with (L) ascending handrail at modified independent    Above goals reviewed on 11/28/2024.  All goals are ongoing at this time unless indicated above.      Therapy Session Time      Individual Group Co-treatment   Time In     1132   Time Out     1214   Minutes     42     Timed Code Treatment Minutes:  27 Minutes  Total Treatment Minutes:  42 minutes        Electronically Signed By: Nabila Esteban PT, DPT [id number]            
visit, this note will serve as discharge.     Treatment time:  Timed Code Treatment Minutes: 0  Total Treatment Time Minutes: 24    Electronically signed by:    Thomas Todd M.A., CCC-SLP   Speech-Language Pathologist  [id number]       
present.  The spleen, adrenal glands, and pancreas are unremarkable.  The kidneys perfuse contrast symmetrically without hydronephrosis.  Bilateral simple renal cysts are present, and no follow-up is recommended. GI/Bowel: There are multiple dilated loops of small bowel.  There is twisting of the mesentery associated with the transition point.  The amount of mucosal enhancement within the dilated loops of bowel is diminished. Pelvis: The urinary bladder is unremarkable.  Metallic clips or calcifications at the prostate. Peritoneum/Retroperitoneum: Mild atherosclerosis.  Small free pelvic fluid. No free intraperitoneal air. Bones/Soft Tissues: Left hip arthroplasty.     1. Small bowel obstruction with twisting mesentery and findings concerning for closed loop obstruction such as internal hernia.  The amount of mucosal enhancement within the dilated loops of bowel is diminished, concerning for vascular compromise. 2. Small free pelvic fluid. Critical results were called by Dr. Dominick Barrera to Beth Hurd on 11/7/2024 at 21:44.       Scheduled Meds:   potassium phosphate IVPB (PERIPHERAL LINE)  25 mmol IntraVENous Once    ferric gluconate  62.5 mg IntraVENous Once    insulin lispro  0-8 Units SubCUTAneous 4x Daily AC & HS    pantoprazole  40 mg IntraVENous Daily    fat emulsion  250 mL IntraVENous Once per day on Monday Thursday    thiamine  100 mg IntraVENous Daily    enoxaparin  40 mg SubCUTAneous Daily    sodium chloride flush  5-40 mL IntraVENous 2 times per day    sodium chloride flush  5-40 mL IntraVENous 2 times per day    lisinopril  20 mg Oral Daily    venlafaxine  150 mg Oral Daily    sodium chloride flush  5-40 mL IntraVENous 2 times per day    melatonin  3 mg Oral Nightly     Continuous Infusions:   PN-Adult Premix 5/20 - Standard Electrolytes - Central Line      PN-Adult Premix 5/20 - Standard Electrolytes - Central Line 75 mL/hr at 12/07/24 1829    sodium chloride      dextrose      sodium chloride 20 
unremarkable.  The kidneys perfuse contrast symmetrically without hydronephrosis.  Bilateral simple renal cysts are present, and no follow-up is recommended. GI/Bowel: There are multiple dilated loops of small bowel.  There is twisting of the mesentery associated with the transition point.  The amount of mucosal enhancement within the dilated loops of bowel is diminished. Pelvis: The urinary bladder is unremarkable.  Metallic clips or calcifications at the prostate. Peritoneum/Retroperitoneum: Mild atherosclerosis.  Small free pelvic fluid. No free intraperitoneal air. Bones/Soft Tissues: Left hip arthroplasty.     1. Small bowel obstruction with twisting mesentery and findings concerning for closed loop obstruction such as internal hernia.  The amount of mucosal enhancement within the dilated loops of bowel is diminished, concerning for vascular compromise. 2. Small free pelvic fluid. Critical results were called by Dr. Dominick Barrera to Beht Guanall on 11/7/2024 at 21:44.       Scheduled Meds:   potassium phosphate IVPB (PERIPHERAL LINE)  10 mmol IntraVENous Once    insulin lispro  0-8 Units SubCUTAneous 4x Daily AC & HS    pantoprazole  40 mg IntraVENous Daily    fat emulsion  250 mL IntraVENous Once per day on Monday Thursday    thiamine  100 mg IntraVENous Daily    enoxaparin  40 mg SubCUTAneous Daily    sodium chloride flush  5-40 mL IntraVENous 2 times per day    sodium chloride flush  5-40 mL IntraVENous 2 times per day    lisinopril  20 mg Oral Daily    venlafaxine  150 mg Oral Daily    sodium chloride flush  5-40 mL IntraVENous 2 times per day    melatonin  3 mg Oral Nightly     Continuous Infusions:   PN-Adult Premix 5/20 - Standard Electrolytes - Central Line      PN-Adult Premix 5/20 - Standard Electrolytes - Central Line 75 mL/hr at 12/06/24 1802    sodium chloride      dextrose      sodium chloride 20 mL/hr at 11/25/24 1229     PRN Meds:.ALTEplase, diatrizoate meglumine-sodium, sodium chloride,

## 2024-12-10 NOTE — CARE COORDINATION
Wound Referral Progress Note       NAME:  Kevin Blair  MEDICAL RECORD NUMBER:  [id number]  AGE: 89 y.o.   GENDER: male  : 1935  TODAY'S DATE:  2024    Subjective   Reason for WOCN Evaluation and Assessment: wound vac placed      Kevin Blair is a 89 y.o. male referred by:   Provider    Wound Identification:  Wound Type:  surgical   Contributing Factors: none    Wound History: patient had abdominal surgery   Current Wound Care Treatment:  moistened gauze dressing covered with abd pad    Patient Care Goal:  Wound Healing        PAST MEDICAL HISTORY        Diagnosis Date    Arthritis     Diverticulitis     Hyperlipidemia     Hypertension     S/P colon resection        PAST SURGICAL HISTORY    Past Surgical History:   Procedure Laterality Date    CARPAL TUNNEL RELEASE      bilat    CATARACT REMOVAL      right eye    COLECTOMY      KNEE ARTHROSCOPY      right    LAPAROTOMY N/A 2024    EXPLORATORY LAPAROTOMY performed by Nghia Sheridan MD at [facility] OR    SHOULDER SURGERY      left    SMALL INTESTINE SURGERY N/A 2024    LAPAROTOMY EXPLORATORY, LYSIS OF ADHESIONS, SMALL BOWEL RESECTION performed by Nghia Sheridan MD at [facility] OR    SMALL INTESTINE SURGERY N/A 2024    SMALL BOWEL RESECTION performed by Nghia Sheridan MD at [facility] OR    TONSILLECTOMY      TOTAL HIP ARTHROPLASTY  2012    LEFT TOTAL HIP ARTHROPLASTY ANTERIOR APPROACH    TURP         FAMILY HISTORY    History reviewed. No pertinent family history.    SOCIAL HISTORY    Social History     Tobacco Use    Smoking status: Former     Current packs/day: 0.00     Types: Cigarettes     Quit date: 2011     Years since quittin.8    Smokeless tobacco: Never   Vaping Use    Vaping status: Never Used   Substance Use Topics    Alcohol use: No    Drug use: No       ALLERGIES    No Known Allergies    MEDICATIONS    No current facility-administered medications on file prior to encounter. 
   11/25/24 1602   Readmission Assessment   Number of Days since last admission? 1-7 days   Previous Disposition Home with Home Health  (active w/[facility])   Who is being Interviewed Patient   What was the patient's/caregiver's perception as to why they think they needed to return back to the hospital? Other (Comment)  (Patient's wound got worse and was oozing orange color)   Did you visit your Primary Care Physician after you left the hospital, before you returned this time? No   Why weren't you able to visit your PCP? Other (Comment)  (saw surgeon first)   Did you see a specialist, such as Cardiac, Pulmonary, Orthopedic Physician, etc. after you left the hospital? Yes  (On Friday 11/22)   Who advised the patient to return to the hospital? Home Health Staff   Does the patient report anything that got in the way of taking their medications? No   In our efforts to provide the best possible care to you and others like you, can you think of anything that we could have done to help you after you left the hospital the first time, so that you might not have needed to return so soon? Other (Comment)  (Pt did not require wound vac last discharge.  Went to surgeon friday 11/22 and was determined then to need wound vac.  Could not order quick enough so pt came back to hospital)     Electronically signed by RUY Chahal, AGUSTIN on 11/25/2024 at 4:04 PM    
   12/10/24 1625   IMM Letter   IMM Letter given to Patient/Family/Significant other/Guardian/POA/by: FELIBERTO Atkins  (Pt  agreeable to discharge within 4 hour notice)   IMM Letter date given: 12/10/24   IMM Letter time given: 1613       
Case Management -  Discharge Note      Patient Name: Kevin Blair                   YOB: 1935  Room: [unfilled]            Readmission Risk (Low < 19, Mod (19-27), High > 27): Readmission Risk Score: 18.2    Current PCP: Blane Callahan MD      (IMM) Important Message from Medicare:    Has pt received appropriate compliance notices before being discharged if required: yes  Compliance doc:  [x] 2nd IMM; [] Code 44 [] [place]  Date: 12/10/24    PT AM-PAC: 17 /24  OT AM-PAC: 19 /24    Patient/patient representative has been educated on the benefits of SNF as well as the possible risks of declining recommended services. Patient/patient representative has acknowledged the information provided and decided on the following discharge plan. Patient/ patient representative has been provided freedom of choice regarding service provider, supported by basic dialogue that supports the patient's individualized plan of care/goals.    Patient noted to have a discharge order.  Pt has been medically cleared for transition to Skilled Nursing Rehab Facility    Patient discharged to   [facility]  [address] [place] [address] [place] [address]  Phone: [phone]  Fax: [phone]         HENS Completed:  yes  Pre-cert required/obtained:  yes    Transportation scheduled for 12/10/24 at 6:00 pm  Transportation provided by [facility]   AVS faxed and agency notified:  yes  The following prescriptions sent with pt: n/a  Family Notified:  yes    Nurse to call report to facility    Family notified of discharge:  [x] Yes  [] No  [] NA    Facility notified of discharge:  [x] Yes  [] No  [] NA     Financial    Payor: HU[employer]NA MEDICARE / Plan: HU[employer]NA CHOICE-PPO MEDICARE / Product Type: *No Product type* /     Pharmacy:  Potential assistance Purchasing Medications: No  Meds-to-Beds request:        [id number] - [place] [address] - P [phone] - F [phone]  [facility], [id number] 
Chart reviewed for discharge planning.  Barrier(s) to discharge-TPN, PO day 6   Tentative discharge plan-home with [facility]     Tentative discharge date- Unknown at present     *Case management will continue to follow progress and update discharge plan as needed.    Electronically signed by FELIBERTO Atkins on 12/2/2024 at 8:55 AM       
Discharge Planning Note Re: Skilled Nursing     CM/SW noted consult for discharge planning. Chart reviewed. Noted recommendations for SNF.  CM met with patient bedside. Introduced self and explained role of CM and discharge planning.    Patient is agreeable to SNF on dc.     [place] of choice list was provided with basic dialogue that supports the patient's individualized plan of care/goals, treatment preferences and shares the quality data associated with the providers. [x] Yes [] No.  Patient an have reviewed the provided list and did not have a preference.    Referral made to following  facilities through [facility]:    [facility], DOES NOT PROVIDE RESOURCES FOR TPN  [address] [place] [address]  Phone:[phone]  Fax:[phone]    [facility]  [address] [place] [address]  Report: [phone]  Fax: [phone]    [facility]  [address] [place] [place]  [address]  Report: [phone]  Fax: [phone]    [facility]  [address] [place] [id number]  Phone: [phone]  Fax: [phone]      CM/SW will follow-up on referrals and provide any additional documentation necessary to facilitate placement.     Electronically signed by Dominic Atkins on 12/4/2024 at 8:57 AM       
Discharge Planning:     (CM) received a call from Madhuri Singh, [phone]. Patient is accepted at [facility] as long as he does not discharge on TPN. Patient must admit by 12/9 or precert expires.    Cm team following.    Electronically signed by FELIBERTO Atkins on 12/6/2024 at 8:47 AM     
Madhuri was able to restart precert and get immediate approval.    Electronically signed by FELIBERTO Atkins on 12/10/2024 at 4:31 PM       Discharge Planning:     (CM) was informed that patient is ready for discharge today.  CM called [facility] liaison to ask about if patient could still come today. Madhuri stated she had to restart the precert.   Madhuri will call me back as soon as she can.    Electronically signed by FELIBERTO Atkins on 12/10/2024 at 3:10 PM     
Patient was accepted at [facility] as long as he is not on TPN at discharge.  Patient is weak from malnutrition and not being able to eat.    CM attempted to complete precert, however, patient did not pull up in system.    Madhuri Singh, Liaison for [facility] has started the precert today.    CM team following.    Electronically signed by FELIBERTO Atkins on 12/4/2024 at 4:32 PM   
SW received a call from Madhuri at [facility] that pt's precert was approved.  Waiting on discharge order at this time.    Electronically signed by RUY Chahal, AGUSTIN on 12/4/2024 at 5:39 PM    
- Standard Electrolytes - [place]  Diet NPO  Dietician consult:  Yes    Discharge Plan:  Placement for patient upon discharge: To be determined TBD    Patient appropriate for Outpatient Wound Care Center: Yes    Referrals:      Patient/Caregiver Teaching:  Level of patient/caregiver understanding able to:   Indicates understanding and Needs reinforcement        Electronically signed by ABBI BRIONES, RN, CWOCN  Inpatient  Wound/Ostomy Care  [phone]  on 11/29/2024 at 12:39 PM         
Yes  Other Identified Issues/Barriers to RETURNING to current housing:  None  Potential Assistance needed at discharge: Home Care, Other (Comment) (possible wound vac need)            Potential DME:    Patient expects to discharge to: Unknown  Plan for transportation at discharge: Family    Financial    Payor: HUMANA MEDICARE / Plan: HUMANA CHOICE-PPO MEDICARE / Product Type: *No Product type* /     Does insurance require precert for SNF: Yes    Potential assistance Purchasing Medications: No  Meds-to-Beds request:        [id number] - [place], [place] - [address] - P [phone] - F [phone]  [address]  [facility] [address]  Phone: [phone] Fax: [phone]      Notes:    Factors facilitating achievement of predicted outcomes: Family support and Pleasant    Barriers to discharge: None    Additional Case Management Notes:  Patient is active w/[facility]  He may need a wound vac to discharge home this time.  SW called Lynn w/[facility] to follow for this need.  No other needs identified yet.    The Plan for Transition of Care is related to the following treatment goals of Goals of care, counseling/discussion [Z71.89]  Inadequate oral intake [R63.8]  Open abdominal wall wound, initial encounter [S31.109A]  Postoperative surgical complication involving skin associated with non-dermatologic procedure, unspecified complication [L76.82]    IF APPLICABLE: The Patient and/or patient representative Kevin and his family were provided with a choice of provider and agrees with the discharge plan. Freedom of choice list with basic dialogue that supports the patient's individualized plan of care/goals and shares the quality data associated with the providers was provided to:     Patient Representative Name:       The Patient and/or Patient Representative Agree with the Discharge Plan?      Francisco J Herron, MSW, LSW  Case Management Department    Electronically signed by Francisco J Herron 
patient had another episode of bleeding from L toe after being discharged  likely in setting of placing pressure on foot, and exacerbating wound causing bleeding  LFTs on Nov 19 th wnl    plan  will send AM labs with Anemia workup, PT/INR/PTT, vWF  Day team to consult Hematology; unable to reach answering service overnight; fu recs given repeat episdoes of bleeding-> assess for any undiagnosed bleeding disorderes?  likely in setting of components of CKD, Chronic Alcohol abuse ( plt in low end of normal), and trauma  transfuse to keep Hgb>7.

## 2024-12-12 ENCOUNTER — TELEPHONE (OUTPATIENT)
Dept: SURGERY | Age: 89
End: 2024-12-12

## 2024-12-12 NOTE — TELEPHONE ENCOUNTER
Tosin from [facility] called about patient. His abdomen has 7 surgical sites. The gauze is sutured into the six sites around the center incision. Should they be treating this with something. They are concerned about it possibly getting infected. Please call Tosin leon/ Darryl [phone]

## 2024-12-19 ENCOUNTER — OFFICE VISIT (OUTPATIENT)
Dept: SURGERY | Age: 89
End: 2024-12-19

## 2024-12-19 VITALS — DIASTOLIC BLOOD PRESSURE: 78 MMHG | WEIGHT: 123.4 LBS | SYSTOLIC BLOOD PRESSURE: 132 MMHG | BODY MASS INDEX: 18.49 KG/M2

## 2024-12-19 DIAGNOSIS — K56.609 SBO (SMALL BOWEL OBSTRUCTION) (HCC): Primary | ICD-10-CM

## 2024-12-19 PROCEDURE — 99024 POSTOP FOLLOW-UP VISIT: CPT | Performed by: SURGERY

## 2024-12-19 NOTE — PROGRESS NOTES
Subjective   Patient ID: Kevin Blair is a 89 y.o. male.    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  Doing fairly well overall.  Wound progressing adequately.  P.o. intake remains fair but he does not like the food at the facility.  He is also awaiting new dentures.  Reportedly progressing well with physical therapy.      Plan   Follow-up in approximately 2 weeks  We will plan for removal of retention sutures at that visit        Nghia Sheridan MD

## 2025-01-08 ENCOUNTER — OFFICE VISIT (OUTPATIENT)
Dept: SURGERY | Age: 89
End: 2025-01-08

## 2025-01-08 VITALS — DIASTOLIC BLOOD PRESSURE: 64 MMHG | WEIGHT: 113.8 LBS | BODY MASS INDEX: 17.05 KG/M2 | SYSTOLIC BLOOD PRESSURE: 106 MMHG

## 2025-01-08 DIAGNOSIS — K56.609 SBO (SMALL BOWEL OBSTRUCTION) (HCC): Primary | ICD-10-CM

## 2025-01-08 PROCEDURE — 99024 POSTOP FOLLOW-UP VISIT: CPT | Performed by: SURGERY

## 2025-01-08 NOTE — PROGRESS NOTES
Subjective   Patient ID: Kevin Blair is a 89 y.o. male.    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  The patient has completely recovered from surgery.  His retention sutures were removed.      Plan   Follow-up as needed        Nghia Sheridan MD

## 2025-02-07 ENCOUNTER — OFFICE VISIT (OUTPATIENT)
Dept: SURGERY | Age: 89
End: 2025-02-07

## 2025-02-07 VITALS — DIASTOLIC BLOOD PRESSURE: 72 MMHG | BODY MASS INDEX: 18.94 KG/M2 | SYSTOLIC BLOOD PRESSURE: 134 MMHG | WEIGHT: 126.4 LBS

## 2025-02-07 DIAGNOSIS — K56.609 SBO (SMALL BOWEL OBSTRUCTION) (HCC): Primary | ICD-10-CM

## 2025-02-07 PROCEDURE — 99024 POSTOP FOLLOW-UP VISIT: CPT | Performed by: SURGERY

## 2025-02-07 NOTE — PROGRESS NOTES
Subjective   Patient ID: Kevin Blair is a 89 y.o. male.    HPI    Review of Systems       Objective   Physical Exam  Abdomen soft  Incision healed except for very small area inferiorly    Assessment   89-year-old male status post exploratory laparotomy with small bowel resection.  He is now fully recovered and is back home.  There is a very small area of his wound (approximately 5 mm) where we removed a dry eschar and applied silver nitrate.  This should continue to heal without any problem.      Plan   Follow-up as needed.        Nghia Sheridan MD

## 2025-06-30 ENCOUNTER — HOSPITAL ENCOUNTER (OUTPATIENT)
Age: 89
Discharge: HOME | End: 2025-06-30
Payer: MEDICARE

## 2025-06-30 LAB
ALBUMIN SERPL-MCNC: 4 G/DL (ref 3.4–5)
ALBUMIN/GLOB SERPL: 1.2 {RATIO} (ref 1.1–2.2)
ALP SERPL-CCNC: 111 U/L (ref 40–129)
ALT SERPL-CCNC: 21 U/L (ref 10–40)
ANION GAP SERPL CALCULATED.3IONS-SCNC: 8 MMOL/L (ref 3–16)
AST SERPL-CCNC: 28 U/L (ref 15–37)
BASOPHILS # BLD: 0 K/UL (ref 0–0.2)
BILIRUB SERPL-MCNC: 0.4 MG/DL (ref 0–1)
BILIRUB UR QL STRIP.AUTO: NEGATIVE
CALCIUM SERPL-MCNC: 11 MG/DL (ref 8.3–10.6)
CHLORIDE SERPL-SCNC: 101 MMOL/L (ref 99–110)
CLARITY UR: CLEAR
CO2 SERPL-SCNC: 32 MMOL/L (ref 21–32)
COLOR UR: YELLOW
DEPRECATED RDW RBC AUTO: 13 % (ref 12.4–15.4)
FOLATE SERPL-MCNC: 21.1 NG/ML (ref 4.78–24.2)
GFR SERPLBLD CREATININE-BSD FMLA CKD-EPI: 72 ML/MIN/{1.73_M2}
GLUCOSE UR STRIP.AUTO-MCNC: NEGATIVE MG/DL
HGB BLD-MCNC: 12.5 G/DL (ref 13.5–17.5)
HGB UR QL STRIP.AUTO: NEGATIVE
IRON SATN MFR SERPL: 27 % (ref 20–50)
IRON SERPL-MCNC: 60 UG/DL (ref 59–158)
LEUKOCYTE ESTERASE UR QL STRIP.AUTO: NEGATIVE
LYMPHOCYTES NFR BLD: 17.5 %
MCH RBC QN AUTO: 31.5 PG (ref 26–34)
MCHC RBC AUTO-ENTMCNC: 33.6 G/DL (ref 31–36)
MCV RBC AUTO: 93.7 FL (ref 80–100)
MONOCYTES # BLD: 0.3 K/UL (ref 0–1.3)
MONOCYTES NFR BLD: 7.2 %
NEUTROPHILS # BLD: 3.5 K/UL (ref 1.7–7.7)
NEUTROPHILS NFR BLD: 73.1 %
NITRITE UR QL STRIP.AUTO: NEGATIVE
PH UR STRIP.AUTO: 6.5 [PH] (ref 5–8)
PLATELET # BLD AUTO: 179 K/UL (ref 135–450)
PMV BLD AUTO: 9.7 FL (ref 5–10.5)
POTASSIUM SERPL-SCNC: 3.9 MMOL/L (ref 3.5–5.1)
PROT SERPL-MCNC: 7.3 G/DL (ref 6.4–8.2)
PROT UR STRIP.AUTO-MCNC: NEGATIVE MG/DL
RBC # BLD AUTO: 3.98 M/UL (ref 4.2–5.9)
SODIUM SERPL-SCNC: 141 MMOL/L (ref 136–145)
SP GR UR STRIP.AUTO: 1.01 (ref 1–1.03)
TIBC SERPL-MCNC: 221 UG/DL (ref 260–445)
TSH SERPL DL<=0.005 MIU/L-ACNC: 2.01 UIU/ML (ref 0.27–4.2)
UA DIPSTICK W REFLEX MICRO PNL UR: NORMAL
URN SPEC COLLECT METH UR: NORMAL
UROBILINOGEN UR STRIP-ACNC: 1 E.U./DL
WBC # BLD AUTO: 4.8 K/UL (ref 4–11)

## 2025-06-30 PROCEDURE — 80053 COMPREHEN METABOLIC PANEL: CPT

## 2025-06-30 PROCEDURE — 83540 ASSAY OF IRON: CPT

## 2025-06-30 PROCEDURE — 82746 ASSAY OF FOLIC ACID SERUM: CPT

## 2025-06-30 PROCEDURE — 83036 HEMOGLOBIN GLYCOSYLATED A1C: CPT

## 2025-06-30 PROCEDURE — 81003 URINALYSIS AUTO W/O SCOPE: CPT

## 2025-06-30 PROCEDURE — 86780 TREPONEMA PALLIDUM: CPT

## 2025-06-30 PROCEDURE — 85025 COMPLETE CBC W/AUTO DIFF WBC: CPT

## 2025-06-30 PROCEDURE — 84443 ASSAY THYROID STIM HORMONE: CPT

## 2025-06-30 PROCEDURE — 83550 IRON BINDING TEST: CPT

## 2025-06-30 PROCEDURE — 82607 VITAMIN B-12: CPT

## 2025-06-30 PROCEDURE — 36415 COLL VENOUS BLD VENIPUNCTURE: CPT

## 2025-07-01 LAB
EST. AVERAGE GLUCOSE BLD GHB EST-MCNC: 96.8 MG/DL
HBA1C MFR BLD: 5 %
REAGIN+T PALLIDUM IGG+IGM SERPL-IMP: NORMAL

## (undated) PROCEDURE — 0DB80ZZ EXCISION OF SMALL INTESTINE, OPEN APPROACH: ICD-10-PCS

## (undated) PROCEDURE — 0DN80ZZ RELEASE SMALL INTESTINE, OPEN APPROACH: ICD-10-PCS

## (undated) DEVICE — STAPLER INT L75MM H1.5X1.8X2MM STD TI 6 ROW LIN CUT

## (undated) DEVICE — SEALER ENDOSCP NANO COAT OPN DIV CRV L JAW LIGASURE IMPACT

## (undated) DEVICE — CATHETER URETH 18FR BLLN 5CC SIL HYDRGEL 2 W F SPEC CARS M

## (undated) DEVICE — SUTURE PERMAHAND SZ 3-0 L18IN NONABSORBABLE BLK L26MM SH C013D

## (undated) DEVICE — SUTURE VICRYL + SZ 3-0 L18IN ABSRB UD SH 1/2 CIR TAPERCUT NDL VCP864D

## (undated) DEVICE — PAD ABSRB W8XL10IN ABD HYDROPHOBIC NONWOVEN THCK LAYR CELOS

## (undated) DEVICE — MAJOR SET UP: Brand: MEDLINE INDUSTRIES, INC.

## (undated) DEVICE — GAUZE,SPONGE,4"X4",8PLY,STRL,LF,10/TRAY: Brand: MEDLINE

## (undated) DEVICE — SUTURE VICRYL + SZ 2-0 L18IN ABSRB CLR TIE POLYGLACTIN 910 VCP111G

## (undated) DEVICE — MOUTHPIECE ENDOSCP L CTRL OPN AND SIDE PORTS DISP

## (undated) DEVICE — TOWEL,OR,DSP,ST,BLUE,STD,6/PK,12PK/CS: Brand: MEDLINE

## (undated) DEVICE — COVER LT HNDL BLU PLAS

## (undated) DEVICE — STERILE POLYISOPRENE POWDER-FREE SURGICAL GLOVES: Brand: PROTEXIS

## (undated) DEVICE — SOLUTION IRRIG 1000ML 0.9% SOD CHL USP POUR PLAS BTL

## (undated) DEVICE — FORCEPS BX L240CM WRK CHN 2.8MM STD CAP W/ NDL MIC MESH

## (undated) DEVICE — SPONGE LAP W18XL18IN WHT COT 4 PLY FLD STRUNG RADPQ DISP ST 2 PER PACK

## (undated) DEVICE — SUTURE ABSORBABLE MONOFILAMENT 0 CTX 60 IN VIO PDS + PDP990G

## (undated) DEVICE — STAPLER SKIN H3.9MM WIRE DIA0.58MM CRWN 6.9MM 35 STPL ROT

## (undated) DEVICE — ENDOSCOPIC KIT 6X3/16 FT COLON W/ 1.1 OZ 2 GWN W/O BRSH

## (undated) DEVICE — PAD,ABDOMINAL,8"X10",ST,LF: Brand: MEDLINE

## (undated) DEVICE — SUTURE ETHILON SZ 2 L30IN NONABSORBABLE BLK L75MM LR 3/8 CIR 490T

## (undated) DEVICE — PENCIL ES L3M BTTN SWCH S STL HEX LOK BLDE ELECTRD HOLSTER

## (undated) DEVICE — STAPLER INT H4.4X1.5MM DIA75MM 0DEG TI UNIV 6 ROW CART

## (undated) DEVICE — SUTURE PDS + SZ 1 L96IN ABSRB VLT L65MM TP-1 1/2 CIR PDP880G

## (undated) DEVICE — AIR/WATER CLEANING ADAPTER FOR OLYMPUS® GI ENDOSCOPE: Brand: BULLDOG®

## (undated) DEVICE — SOLUTION IRRIG 500ML STRL H2O NONPYROGENIC

## (undated) DEVICE — WET SKIN PREP TRAY: Brand: MEDLINE INDUSTRIES, INC.

## (undated) DEVICE — 1LYRTR 16FR10ML 100%SILI SNAP: Brand: MEDLINE INDUSTRIES, INC.

## (undated) DEVICE — BW-412T DISP COMBO CLEANING BRUSH: Brand: SINGLE USE COMBINATION CLEANING BRUSH

## (undated) DEVICE — BLADE CLIPPER GEN PURP NS

## (undated) DEVICE — APPLICATOR MEDICATED 26 CC SOLUTION HI LT ORNG CHLORAPREP

## (undated) DEVICE — SINGLE USE AIR/WATER, SUCTION AND BIOPSY VALVES SET: Brand: ORCAPOD™

## (undated) DEVICE — CATHETER URETH 16FR BLLN 5CC 2 W F SPEC M OLV COUDE TIP